# Patient Record
Sex: MALE | Race: WHITE | NOT HISPANIC OR LATINO | Employment: OTHER | URBAN - METROPOLITAN AREA
[De-identification: names, ages, dates, MRNs, and addresses within clinical notes are randomized per-mention and may not be internally consistent; named-entity substitution may affect disease eponyms.]

---

## 2017-03-31 RX ORDER — PRAVASTATIN SODIUM 80 MG/1
TABLET ORAL
Qty: 90 TABLET | Refills: 0 | Status: SHIPPED | OUTPATIENT
Start: 2017-03-31 | End: 2019-10-30 | Stop reason: SDUPTHER

## 2017-05-15 RX ORDER — LISINOPRIL 10 MG/1
TABLET ORAL
Qty: 90 TABLET | Refills: 0 | Status: SHIPPED | OUTPATIENT
Start: 2017-05-15 | End: 2017-08-15 | Stop reason: SDUPTHER

## 2017-07-06 ENCOUNTER — TRANSCRIBE ORDERS (OUTPATIENT)
Dept: LAB | Facility: HOSPITAL | Age: 67
End: 2017-07-06

## 2017-07-06 ENCOUNTER — APPOINTMENT (OUTPATIENT)
Dept: LAB | Facility: HOSPITAL | Age: 67
End: 2017-07-06

## 2017-07-06 DIAGNOSIS — E78.5 HYPERLIPIDEMIA, UNSPECIFIED HYPERLIPIDEMIA TYPE: Primary | ICD-10-CM

## 2017-07-06 DIAGNOSIS — I10 ESSENTIAL HYPERTENSION, MALIGNANT: ICD-10-CM

## 2017-07-06 LAB
ALBUMIN SERPL-MCNC: 4.1 G/DL (ref 3.2–4.8)
ALBUMIN/GLOB SERPL: 1.7 G/DL (ref 1.5–2.5)
ALP SERPL-CCNC: 70 U/L (ref 25–100)
ALT SERPL W P-5'-P-CCNC: 33 U/L (ref 7–40)
ANION GAP SERPL CALCULATED.3IONS-SCNC: 6 MMOL/L (ref 3–11)
AST SERPL-CCNC: 21 U/L (ref 0–33)
BASOPHILS # BLD AUTO: 0.02 10*3/MM3 (ref 0–0.2)
BASOPHILS NFR BLD AUTO: 0.2 % (ref 0–1)
BILIRUB SERPL-MCNC: 0.5 MG/DL (ref 0.3–1.2)
BUN BLD-MCNC: 14 MG/DL (ref 9–23)
BUN/CREAT SERPL: 14 (ref 7–25)
CALCIUM SPEC-SCNC: 9.2 MG/DL (ref 8.7–10.4)
CHLORIDE SERPL-SCNC: 108 MMOL/L (ref 99–109)
CO2 SERPL-SCNC: 25 MMOL/L (ref 20–31)
CREAT BLD-MCNC: 1 MG/DL (ref 0.6–1.3)
DEPRECATED RDW RBC AUTO: 43.7 FL (ref 37–54)
EOSINOPHIL # BLD AUTO: 0.24 10*3/MM3 (ref 0–0.3)
EOSINOPHIL NFR BLD AUTO: 2.6 % (ref 0–3)
ERYTHROCYTE [DISTWIDTH] IN BLOOD BY AUTOMATED COUNT: 12.8 % (ref 11.3–14.5)
GFR SERPL CREATININE-BSD FRML MDRD: 74 ML/MIN/1.73
GLOBULIN UR ELPH-MCNC: 2.4 GM/DL
GLUCOSE BLD-MCNC: 100 MG/DL (ref 70–100)
HCT VFR BLD AUTO: 47.9 % (ref 38.9–50.9)
HGB BLD-MCNC: 15.7 G/DL (ref 13.1–17.5)
IMM GRANULOCYTES # BLD: 0.07 10*3/MM3 (ref 0–0.03)
IMM GRANULOCYTES NFR BLD: 0.8 % (ref 0–0.6)
LYMPHOCYTES # BLD AUTO: 2.06 10*3/MM3 (ref 0.6–4.8)
LYMPHOCYTES NFR BLD AUTO: 22.7 % (ref 24–44)
MCH RBC QN AUTO: 30.5 PG (ref 27–31)
MCHC RBC AUTO-ENTMCNC: 32.8 G/DL (ref 32–36)
MCV RBC AUTO: 93 FL (ref 80–99)
MONOCYTES # BLD AUTO: 0.62 10*3/MM3 (ref 0–1)
MONOCYTES NFR BLD AUTO: 6.8 % (ref 0–12)
NEUTROPHILS # BLD AUTO: 6.07 10*3/MM3 (ref 1.5–8.3)
NEUTROPHILS NFR BLD AUTO: 66.9 % (ref 41–71)
PLATELET # BLD AUTO: 196 10*3/MM3 (ref 150–450)
PMV BLD AUTO: 10.2 FL (ref 6–12)
POTASSIUM BLD-SCNC: 4.1 MMOL/L (ref 3.5–5.5)
PROT SERPL-MCNC: 6.5 G/DL (ref 5.7–8.2)
RBC # BLD AUTO: 5.15 10*6/MM3 (ref 4.2–5.76)
SODIUM BLD-SCNC: 139 MMOL/L (ref 132–146)
VIT B12 BLD-MCNC: 314 PG/ML (ref 211–911)
WBC NRBC COR # BLD: 9.08 10*3/MM3 (ref 3.5–10.8)

## 2017-07-06 PROCEDURE — 36415 COLL VENOUS BLD VENIPUNCTURE: CPT | Performed by: NURSE PRACTITIONER

## 2017-07-06 PROCEDURE — 85025 COMPLETE CBC W/AUTO DIFF WBC: CPT | Performed by: NURSE PRACTITIONER

## 2017-07-06 PROCEDURE — 82607 VITAMIN B-12: CPT | Performed by: NURSE PRACTITIONER

## 2017-07-06 PROCEDURE — 80053 COMPREHEN METABOLIC PANEL: CPT | Performed by: NURSE PRACTITIONER

## 2017-08-15 RX ORDER — LISINOPRIL 10 MG/1
TABLET ORAL
Qty: 90 TABLET | Refills: 0 | Status: SHIPPED | OUTPATIENT
Start: 2017-08-15 | End: 2019-10-30 | Stop reason: SDUPTHER

## 2019-04-06 ENCOUNTER — APPOINTMENT (OUTPATIENT)
Dept: CT IMAGING | Facility: HOSPITAL | Age: 69
End: 2019-04-06

## 2019-04-06 ENCOUNTER — HOSPITAL ENCOUNTER (OUTPATIENT)
Facility: HOSPITAL | Age: 69
Discharge: HOME | End: 2019-04-09
Attending: EMERGENCY MEDICINE | Admitting: SURGERY

## 2019-04-06 DIAGNOSIS — K92.2 GASTROINTESTINAL HEMORRHAGE, UNSPECIFIED GASTROINTESTINAL HEMORRHAGE TYPE: Primary | ICD-10-CM

## 2019-04-06 DIAGNOSIS — Z74.09 IMPAIRED FUNCTIONAL MOBILITY, BALANCE, GAIT, AND ENDURANCE: ICD-10-CM

## 2019-04-06 DIAGNOSIS — D62 ACUTE BLOOD LOSS ANEMIA: ICD-10-CM

## 2019-04-06 PROBLEM — Z72.0 CHEWING TOBACCO USE: Status: ACTIVE | Noted: 2019-04-06

## 2019-04-06 PROBLEM — D75.839 THROMBOCYTOSIS: Status: ACTIVE | Noted: 2019-04-06

## 2019-04-06 PROBLEM — D64.9 SYMPTOMATIC ANEMIA: Status: ACTIVE | Noted: 2019-04-06

## 2019-04-06 LAB
ABO GROUP BLD: NORMAL
ABO GROUP BLD: NORMAL
ALBUMIN SERPL-MCNC: 4.3 G/DL (ref 3.5–5)
ALBUMIN/GLOB SERPL: 1.4 G/DL (ref 1–2)
ALP SERPL-CCNC: 65 U/L (ref 38–126)
ALT SERPL W P-5'-P-CCNC: 11 U/L (ref 13–69)
ANION GAP SERPL CALCULATED.3IONS-SCNC: 16 MMOL/L (ref 10–20)
ANISOCYTOSIS BLD QL: NORMAL
AST SERPL-CCNC: 16 U/L (ref 15–46)
BASOPHILS # BLD AUTO: 0.06 10*3/MM3 (ref 0–0.2)
BASOPHILS NFR BLD AUTO: 0.5 % (ref 0–1.5)
BILIRUB SERPL-MCNC: 0.3 MG/DL (ref 0.2–1.3)
BLD GP AB SCN SERPL QL: NEGATIVE
BUN BLD-MCNC: 14 MG/DL (ref 7–20)
BUN/CREAT SERPL: 11.7 (ref 6.3–21.9)
CALCIUM SPEC-SCNC: 9.8 MG/DL (ref 8.4–10.2)
CHLORIDE SERPL-SCNC: 103 MMOL/L (ref 98–107)
CO2 SERPL-SCNC: 24 MMOL/L (ref 26–30)
CREAT BLD-MCNC: 1.2 MG/DL (ref 0.6–1.3)
DEPRECATED RDW RBC AUTO: 45.3 FL (ref 37–54)
EOSINOPHIL # BLD AUTO: 0.21 10*3/MM3 (ref 0–0.4)
EOSINOPHIL NFR BLD AUTO: 1.9 % (ref 0.3–6.2)
ERYTHROCYTE [DISTWIDTH] IN BLOOD BY AUTOMATED COUNT: 19.2 % (ref 12.3–15.4)
GFR SERPL CREATININE-BSD FRML MDRD: 60 ML/MIN/1.73
GLOBULIN UR ELPH-MCNC: 3 GM/DL
GLUCOSE BLD-MCNC: 139 MG/DL (ref 74–98)
HCT VFR BLD AUTO: 25.7 % (ref 37.5–51)
HCT VFR BLD AUTO: 28.7 % (ref 37.5–51)
HEMOCCULT STL QL: POSITIVE
HGB BLD-MCNC: 6.5 G/DL (ref 13–17.7)
HGB BLD-MCNC: 8.2 G/DL (ref 13–17.7)
HOLD SPECIMEN: NORMAL
HOLD SPECIMEN: NORMAL
HYPOCHROMIA BLD QL: NORMAL
IMM GRANULOCYTES # BLD AUTO: 0.05 10*3/MM3 (ref 0–0.05)
IMM GRANULOCYTES NFR BLD AUTO: 0.5 % (ref 0–0.5)
INR PPP: 1.1 (ref 0.9–1.1)
IRON 24H UR-MRATE: 22 MCG/DL (ref 37–181)
IRON SATN MFR SERPL: 5 % (ref 11–46)
LYMPHOCYTES # BLD AUTO: 1.68 10*3/MM3 (ref 0.7–3.1)
LYMPHOCYTES NFR BLD AUTO: 15.1 % (ref 19.6–45.3)
MCH RBC QN AUTO: 16.8 PG (ref 26.6–33)
MCHC RBC AUTO-ENTMCNC: 25.3 G/DL (ref 31.5–35.7)
MCV RBC AUTO: 66.4 FL (ref 79–97)
MONOCYTES # BLD AUTO: 0.73 10*3/MM3 (ref 0.1–0.9)
MONOCYTES NFR BLD AUTO: 6.6 % (ref 5–12)
NEUTROPHILS # BLD AUTO: 8.36 10*3/MM3 (ref 1.4–7)
NEUTROPHILS NFR BLD AUTO: 75.4 % (ref 42.7–76)
NRBC BLD AUTO-RTO: 0 /100 WBC (ref 0–0)
OVALOCYTES BLD QL SMEAR: NORMAL
PLAT MORPH BLD: NORMAL
PLATELET # BLD AUTO: 523 10*3/MM3 (ref 140–450)
PMV BLD AUTO: 8.8 FL (ref 6–12)
POIKILOCYTOSIS BLD QL SMEAR: NORMAL
POTASSIUM BLD-SCNC: 4 MMOL/L (ref 3.5–5.1)
PROT SERPL-MCNC: 7.3 G/DL (ref 6.3–8.2)
PROTHROMBIN TIME: 14.5 SECONDS (ref 12–15.1)
RBC # BLD AUTO: 3.87 10*6/MM3 (ref 4.14–5.8)
RH BLD: POSITIVE
RH BLD: POSITIVE
SODIUM BLD-SCNC: 139 MMOL/L (ref 137–145)
T&S EXPIRATION DATE: NORMAL
TARGETS BLD QL SMEAR: NORMAL
TIBC SERPL-MCNC: 487 MCG/DL (ref 261–497)
WBC MORPH BLD: NORMAL
WBC NRBC COR # BLD: 11.09 10*3/MM3 (ref 3.4–10.8)
WHOLE BLOOD HOLD SPECIMEN: NORMAL
WHOLE BLOOD HOLD SPECIMEN: NORMAL

## 2019-04-06 PROCEDURE — 99220 PR INITIAL OBSERVATION CARE/DAY 70 MINUTES: CPT | Performed by: HOSPITALIST

## 2019-04-06 PROCEDURE — 82272 OCCULT BLD FECES 1-3 TESTS: CPT | Performed by: PHYSICIAN ASSISTANT

## 2019-04-06 PROCEDURE — 83550 IRON BINDING TEST: CPT | Performed by: HOSPITALIST

## 2019-04-06 PROCEDURE — 85610 PROTHROMBIN TIME: CPT | Performed by: PHYSICIAN ASSISTANT

## 2019-04-06 PROCEDURE — 85007 BL SMEAR W/DIFF WBC COUNT: CPT | Performed by: PHYSICIAN ASSISTANT

## 2019-04-06 PROCEDURE — 86900 BLOOD TYPING SEROLOGIC ABO: CPT

## 2019-04-06 PROCEDURE — 85014 HEMATOCRIT: CPT | Performed by: HOSPITALIST

## 2019-04-06 PROCEDURE — 25010000002 IOPAMIDOL 61 % SOLUTION: Performed by: EMERGENCY MEDICINE

## 2019-04-06 PROCEDURE — G0378 HOSPITAL OBSERVATION PER HR: HCPCS

## 2019-04-06 PROCEDURE — 93005 ELECTROCARDIOGRAM TRACING: CPT | Performed by: EMERGENCY MEDICINE

## 2019-04-06 PROCEDURE — 86901 BLOOD TYPING SEROLOGIC RH(D): CPT | Performed by: PHYSICIAN ASSISTANT

## 2019-04-06 PROCEDURE — 86901 BLOOD TYPING SEROLOGIC RH(D): CPT

## 2019-04-06 PROCEDURE — 36430 TRANSFUSION BLD/BLD COMPNT: CPT

## 2019-04-06 PROCEDURE — 74177 CT ABD & PELVIS W/CONTRAST: CPT

## 2019-04-06 PROCEDURE — 80053 COMPREHEN METABOLIC PANEL: CPT | Performed by: PHYSICIAN ASSISTANT

## 2019-04-06 PROCEDURE — 85025 COMPLETE CBC W/AUTO DIFF WBC: CPT | Performed by: PHYSICIAN ASSISTANT

## 2019-04-06 PROCEDURE — 85018 HEMOGLOBIN: CPT | Performed by: HOSPITALIST

## 2019-04-06 PROCEDURE — 86900 BLOOD TYPING SEROLOGIC ABO: CPT | Performed by: PHYSICIAN ASSISTANT

## 2019-04-06 PROCEDURE — 86920 COMPATIBILITY TEST SPIN: CPT

## 2019-04-06 PROCEDURE — 86850 RBC ANTIBODY SCREEN: CPT | Performed by: PHYSICIAN ASSISTANT

## 2019-04-06 PROCEDURE — P9016 RBC LEUKOCYTES REDUCED: HCPCS

## 2019-04-06 PROCEDURE — 83540 ASSAY OF IRON: CPT | Performed by: HOSPITALIST

## 2019-04-06 PROCEDURE — 99285 EMERGENCY DEPT VISIT HI MDM: CPT

## 2019-04-06 RX ORDER — ACETAMINOPHEN 325 MG/1
650 TABLET ORAL EVERY 4 HOURS PRN
Status: DISCONTINUED | OUTPATIENT
Start: 2019-04-06 | End: 2019-04-09 | Stop reason: HOSPADM

## 2019-04-06 RX ORDER — ATORVASTATIN CALCIUM 20 MG/1
20 TABLET, FILM COATED ORAL NIGHTLY
Status: DISCONTINUED | OUTPATIENT
Start: 2019-04-06 | End: 2019-04-09 | Stop reason: HOSPADM

## 2019-04-06 RX ORDER — SODIUM CHLORIDE 0.9 % (FLUSH) 0.9 %
10 SYRINGE (ML) INJECTION AS NEEDED
Status: DISCONTINUED | OUTPATIENT
Start: 2019-04-06 | End: 2019-04-09 | Stop reason: HOSPADM

## 2019-04-06 RX ORDER — SODIUM CHLORIDE 0.9 % (FLUSH) 0.9 %
3 SYRINGE (ML) INJECTION EVERY 12 HOURS SCHEDULED
Status: DISCONTINUED | OUTPATIENT
Start: 2019-04-06 | End: 2019-04-09 | Stop reason: HOSPADM

## 2019-04-06 RX ORDER — LISINOPRIL 5 MG/1
5 TABLET ORAL DAILY
Status: DISCONTINUED | OUTPATIENT
Start: 2019-04-07 | End: 2019-04-09 | Stop reason: HOSPADM

## 2019-04-06 RX ORDER — SODIUM CHLORIDE 0.9 % (FLUSH) 0.9 %
3-10 SYRINGE (ML) INJECTION AS NEEDED
Status: DISCONTINUED | OUTPATIENT
Start: 2019-04-06 | End: 2019-04-09 | Stop reason: HOSPADM

## 2019-04-06 RX ORDER — BISOPROLOL FUMARATE 5 MG/1
5 TABLET, FILM COATED ORAL DAILY
Status: DISCONTINUED | OUTPATIENT
Start: 2019-04-07 | End: 2019-04-09 | Stop reason: HOSPADM

## 2019-04-06 RX ORDER — ONDANSETRON 2 MG/ML
4 INJECTION INTRAMUSCULAR; INTRAVENOUS EVERY 6 HOURS PRN
Status: DISCONTINUED | OUTPATIENT
Start: 2019-04-06 | End: 2019-04-09 | Stop reason: HOSPADM

## 2019-04-06 RX ADMIN — SODIUM CHLORIDE, PRESERVATIVE FREE 3 ML: 5 INJECTION INTRAVENOUS at 21:30

## 2019-04-06 RX ADMIN — ATORVASTATIN CALCIUM 20 MG: 20 TABLET, FILM COATED ORAL at 21:30

## 2019-04-06 RX ADMIN — IOPAMIDOL 100 ML: 612 INJECTION, SOLUTION INTRAVENOUS at 14:40

## 2019-04-06 NOTE — ED NOTES
Dr. Diaz called per Patrice HENRIQUEZ with no answer. Left message on voicemail.      Ronna Isidro  04/06/19 9553

## 2019-04-06 NOTE — ED PROVIDER NOTES
Subjective   69-year-old male presents the emergency department for weakness, he states that he had labs drawn recently and was called and told that his hemoglobin and hematocrit are low.  He states that for some time he is noticed that he is getting progressively weaker.  He has also been told that he appears pale.  He has not noticed any blood loss, he denies any blood in his stool or vomit.  He is never had upper or lower GI scope.        History provided by:  Patient   used: No        Review of Systems   Endocrine:        Anemia   Neurological: Positive for weakness.   All other systems reviewed and are negative.      Past Medical History:   Diagnosis Date   • Coronary artery disease    • Dyslipidemia    • Hypertension    • Subsequent ST elevation (STEMI) myocardial infarction of inferior wall (CMS/[facility]) 07/29/2011    99% RCA (2.75 x 23 Promus post with 3.0 Quantum), 60% OM, LVEF normal.       Allergies   Allergen Reactions   • Losartan Rash       History reviewed. No pertinent surgical history.    Family History   Problem Relation Age of Onset   • Emphysema Father    • No Known Problems Sister        Social History     Socioeconomic History   • Marital status: Single     Spouse name: Not on file   • Number of children: Not on file   • Years of education: Not on file   • Highest education level: Not on file   Tobacco Use   • Smoking status: Never Smoker   • Smokeless tobacco: Current User     Types: Snuff   Substance and Sexual Activity   • Alcohol use: No   • Drug use: No   • Sexual activity: Defer           Objective   Physical Exam   Constitutional: He is oriented to person, place, and time. He appears well-developed and well-nourished.   HENT:   Head: Normocephalic and atraumatic.   Left Ear: External ear normal.   Eyes: EOM are normal. Pupils are equal, round, and reactive to light.   Pale conjunctiva   Neck: Normal range of motion.   Cardiovascular: Normal rate and regular rhythm.    Pulmonary/Chest: Effort normal and breath sounds normal.   Abdominal: Soft. Bowel sounds are normal.   Musculoskeletal: Normal range of motion.   Neurological: He is alert and oriented to person, place, and time.   Skin: Skin is warm and dry. There is pallor.   Psychiatric: He has a normal mood and affect. His behavior is normal.   Vitals reviewed.      Procedures           ED Course  ED Course as of Apr 06 1516   Sat Apr 06, 2019   1507 Discussed with Dr. Hinojosa he would be available to see the patient is a consult admit to the hospitalist  [CS]   1512 Discussed with Dr. Diaz she accepted the patient for admission  [CS]      ED Course User Index  [CS] Patrice Rivera Jr., [place]                  MDM  Number of Diagnoses or Management Options  Gastrointestinal hemorrhage, unspecified gastrointestinal hemorrhage type: new and requires workup     Amount and/or Complexity of Data Reviewed  Discuss the patient with other providers: yes    Risk of Complications, Morbidity, and/or Mortality  Presenting problems: minimal  Diagnostic procedures: minimal  Management options: minimal    Patient Progress  Patient progress: stable        Final diagnoses:   Gastrointestinal hemorrhage, unspecified gastrointestinal hemorrhage type            Patrice Rivera Jr., [place]  04/06/19 1516

## 2019-04-06 NOTE — H&P
[facility]    Name: Nilay Odonnell, 69 y.o. male  MRN: [id number], : 1950   Date of Admission: 2019   PCP: Michele Burris MD     Chief Complaint: abnormal labs     History of Present Illness:  Nilay Odonnell is a(n) 69 y.o. year old male with a history of CAD (inferior wall STEM[facility] 2011 [facility] HTN who was admitted on 2019 from home to [facility] ER with abnormal labs. Hemoglobin was 6.5 today in ER. Patient noted fatigue and being unsteady when getting up this winter. He denies any chest pain or pressure. He denies any shortness of breath with exertion. Denies abdominal pain, NSAID use, alcohol use, melena, or BRBPR. Denies any weightloss. Never had colonoscopy. He does take aspirin 81mg. He lives alone, has been using a staff recently to steady himself. No history of falls.    Vitals on admission notable for: AFVSS. Labs on admission notable for: PT/INR wnl; hgb 6.5; platelet 523; FOBT positive. #2 units pRBC ordered in ER. Studies on admission notable for: CT A/P: \"No CT evidence of acute intraabdominal or intrapelvic abnormality.\". EKG: (my read), compared to EKG from: 12. Rate: 81bpm / Rhythm: NSR / Axis: left / ST/T changes: TWI II, III, V1, V3 / Hypertrophy: no / QTc: 392ms.    Patient seen at 415pm on 2019. History was provided by: chart review, discussion with ER provider (Patrice Rivera Jr., PA-C), and patient.     Recent hospitalization?: no     =========================================================================  History:   ROS: all other systems reviewed and are negative  PMHx: Patient  has a past medical history of Coronary artery disease, Dyslipidemia, Hypertension, and Subsequent ST elevation (STEMI) myocardial infarction of inferior wall (CMS/HCC) (2011).   PSHx: skin cancers  FamHx: Patient family history includes Emphysema in his father; No Known Problems in his sister.   SocHx: Patient  reports that " "he has never smoked. His smokeless tobacco use includes snuff. He reports that he does not drink alcohol or use drugs.   Allergies: Patient is allergic to losartan.   Medications:   No current facility-administered medications on file prior to encounter.      Current Outpatient Medications on File Prior to Encounter   Medication Sig Dispense Refill   • aspirin 81 MG EC tablet Take 81 mg by mouth daily.     • bisoprolol (ZEBETA) 5 MG tablet Take 1 tablet by mouth daily. 90 tablet 3   • lisinopril (PRINIVIL,ZESTRIL) 10 MG tablet TAKE ONE TABLET BY MOUTH ONCE DAILY 90 tablet 0   • pravastatin (PRAVACHOL) 80 MG tablet TAKE ONE TABLET BY MOUTH ONCE DAILY EVERY  NIGHT 90 tablet 0        =========================================================================    Vitals:   /58   Pulse 73   Temp 98.2 °F (36.8 °C)   Resp 18   Ht 177.8 cm (70\")   Wt 95.9 kg (211 lb 6.4 oz)   SpO2 98%   BMI 30.33 kg/m²    SpO2: 98 %   No intake or output data in the 24 hours ending 04/06/19 1513     Physical Exam:   General Appearance:  Elderly male; pale; Alert and cooperative, not in any acute distress.   Head:  Atraumatic and normocephalic, without obvious abnormality.   Eyes:          PERRL, conjunctivae pallor; sclerae normal, no Icterus. No pallor. Extraocular movements are within normal limits.   Ears:  Ears appear intact with no abnormalities noted.   Throat: No oral lesions, no thrush, oral mucosa moist.   Neck: Supple, trachea midline   Lungs:   Chest shape is normal. Breath sounds heard bilaterally equally.  No crackles or wheezing. No Pleural rub or bronchial breathing.   Heart:  Normal S1 and S2, no murmur, no gallop, no rub   Abdomen:   Normal bowel sounds, no masses. Soft, non-tender, non-distended, no guarding, no rebound tenderness.   Genitourinary: deferred   Extremities: Moves all extremities well, no edema, no cyanosis, no clubbing.   Pulses: Pulses palpable and equal bilaterally.   Skin: No bleeding, bruising " or rash.   Lymph nodes: No palpable adenopathy.   Neurologic: Alert and oriented x 3. Moves all four limbs equally. No tremors. No facial asymetry.       Labs:   Results from last 7 days   Lab Units 04/06/19  1305   SODIUM mmol/L 139   POTASSIUM mmol/L 4.0   CHLORIDE mmol/L 103   CO2 mmol/L 24.0*   BUN mg/dL 14   CREATININE mg/dL 1.20   CALCIUM mg/dL 9.8   BILIRUBIN mg/dL 0.3   ALK PHOS U/L 65   ALT (SGPT) U/L 11*   AST (SGOT) U/L 16   GLUCOSE mg/dL 139*     Results from last 7 days   Lab Units 04/06/19  1305   WBC 10*3/mm3 11.09*   HEMOGLOBIN g/dL 6.5*   HEMATOCRIT % 25.7*   PLATELETS 10*3/mm3 523*     Results from last 7 days   Lab Units 04/06/19  1305   INR  1.10       =========================================================================  Assessment/Plan:   Nilay Odonnell is a(n) 69 y.o. year old male with:     1. Symptomatic microcytic anemia, suspect chronic blood loss and iron deficiency  2. Thrombocytosis, suspect reactive  3. CAD s/p STEMI 2011 s/p stent x 1  4. HTN  5. Chewing tobacco use, x 30years    Will transfuse 2 units PRBC. Start clear liquid diet. Hemodynamically stable. Add-on iron profile to prior labs. Will consult Dr. Hinojosa, contacted by ER provider. Patient interested in proceeding with colonoscopy. Maintain 2 large bore PIVs at all times. Advised to stop smokeless tobacco use, patient does not seem ready to consider. Fall precautions. Consult PT/OT tomorrow. Hold aspirin. Patient states she takes half of the lisinopril dose. He was not sure if he has 10mg or 20mg tablets at home. I called [facility] pharmacy and confirmed that he has lisinopril 10mg prescription. Will order lisinopril 5mg dose here.    // F/E/N: clear liquids; NPO at MN  // DVT PPx: SCDs   // GI PPx: not indicated    // Code Status: FULL CODE - per discussion with patient, would only want to be on ventilator short-term  // NOK: Abhay Odonnell (brother) [phone]  // Dispo: admission, observation, need for hospitalization:  above    Assessment and plan was discussed with the patient and primary nurse Veronica. All questions were answered.     Time in: 415pm Time out: 458pm   Date patient seen: 4/6/2019     Treasure Diaz MD   3:13 PM on 4/6/2019

## 2019-04-07 LAB
ANION GAP SERPL CALCULATED.3IONS-SCNC: 13.2 MMOL/L (ref 10–20)
ANISOCYTOSIS BLD QL: NORMAL
BASOPHILS # BLD AUTO: 0.07 10*3/MM3 (ref 0–0.2)
BASOPHILS NFR BLD AUTO: 0.6 % (ref 0–1.5)
BUN BLD-MCNC: 11 MG/DL (ref 7–20)
BUN/CREAT SERPL: 10 (ref 6.3–21.9)
CALCIUM SPEC-SCNC: 9 MG/DL (ref 8.4–10.2)
CHLORIDE SERPL-SCNC: 106 MMOL/L (ref 98–107)
CO2 SERPL-SCNC: 23 MMOL/L (ref 26–30)
CREAT BLD-MCNC: 1.1 MG/DL (ref 0.6–1.3)
DEPRECATED RDW RBC AUTO: 52.5 FL (ref 37–54)
EOSINOPHIL # BLD AUTO: 0.26 10*3/MM3 (ref 0–0.4)
EOSINOPHIL NFR BLD AUTO: 2.4 % (ref 0.3–6.2)
ERYTHROCYTE [DISTWIDTH] IN BLOOD BY AUTOMATED COUNT: 21.4 % (ref 12.3–15.4)
GFR SERPL CREATININE-BSD FRML MDRD: 66 ML/MIN/1.73
GLUCOSE BLD-MCNC: 91 MG/DL (ref 74–98)
HCT VFR BLD AUTO: 28 % (ref 37.5–51)
HGB BLD-MCNC: 7.9 G/DL (ref 13–17.7)
IMM GRANULOCYTES # BLD AUTO: 0.06 10*3/MM3 (ref 0–0.05)
IMM GRANULOCYTES NFR BLD AUTO: 0.5 % (ref 0–0.5)
LYMPHOCYTES # BLD AUTO: 1.67 10*3/MM3 (ref 0.7–3.1)
LYMPHOCYTES NFR BLD AUTO: 15.3 % (ref 19.6–45.3)
MCH RBC QN AUTO: 19.4 PG (ref 26.6–33)
MCHC RBC AUTO-ENTMCNC: 28.2 G/DL (ref 31.5–35.7)
MCV RBC AUTO: 68.8 FL (ref 79–97)
MICROCYTES BLD QL: NORMAL
MONOCYTES # BLD AUTO: 0.79 10*3/MM3 (ref 0.1–0.9)
MONOCYTES NFR BLD AUTO: 7.2 % (ref 5–12)
NEUTROPHILS # BLD AUTO: 8.08 10*3/MM3 (ref 1.4–7)
NEUTROPHILS NFR BLD AUTO: 74 % (ref 42.7–76)
NRBC BLD AUTO-RTO: 0 /100 WBC (ref 0–0)
PLATELET # BLD AUTO: 402 10*3/MM3 (ref 140–450)
PMV BLD AUTO: 9.4 FL (ref 6–12)
POTASSIUM BLD-SCNC: 4.2 MMOL/L (ref 3.5–5.1)
RBC # BLD AUTO: 4.07 10*6/MM3 (ref 4.14–5.8)
SMALL PLATELETS BLD QL SMEAR: NORMAL
SODIUM BLD-SCNC: 138 MMOL/L (ref 137–145)
WBC MORPH BLD: NORMAL
WBC NRBC COR # BLD: 10.93 10*3/MM3 (ref 3.4–10.8)

## 2019-04-07 PROCEDURE — 99224 PR SBSQ OBSERVATION CARE/DAY 15 MINUTES: CPT | Performed by: HOSPITALIST

## 2019-04-07 PROCEDURE — 85007 BL SMEAR W/DIFF WBC COUNT: CPT | Performed by: HOSPITALIST

## 2019-04-07 PROCEDURE — 99202 OFFICE O/P NEW SF 15 MIN: CPT | Performed by: SURGERY

## 2019-04-07 PROCEDURE — 97161 PT EVAL LOW COMPLEX 20 MIN: CPT

## 2019-04-07 PROCEDURE — G0378 HOSPITAL OBSERVATION PER HR: HCPCS

## 2019-04-07 PROCEDURE — 80048 BASIC METABOLIC PNL TOTAL CA: CPT | Performed by: HOSPITALIST

## 2019-04-07 PROCEDURE — 85025 COMPLETE CBC W/AUTO DIFF WBC: CPT | Performed by: HOSPITALIST

## 2019-04-07 RX ORDER — DEXTROSE AND SODIUM CHLORIDE 5; .45 G/100ML; G/100ML
75 INJECTION, SOLUTION INTRAVENOUS CONTINUOUS
Status: DISCONTINUED | OUTPATIENT
Start: 2019-04-07 | End: 2019-04-08

## 2019-04-07 RX ADMIN — ATORVASTATIN CALCIUM 20 MG: 20 TABLET, FILM COATED ORAL at 21:18

## 2019-04-07 RX ADMIN — SODIUM CHLORIDE, PRESERVATIVE FREE 3 ML: 5 INJECTION INTRAVENOUS at 08:27

## 2019-04-07 RX ADMIN — POLYETHYLENE GLYCOL 3350 255 G: 17 POWDER, FOR SOLUTION ORAL at 16:25

## 2019-04-07 RX ADMIN — LISINOPRIL 5 MG: 5 TABLET ORAL at 08:26

## 2019-04-07 RX ADMIN — SODIUM CHLORIDE, PRESERVATIVE FREE 3 ML: 5 INJECTION INTRAVENOUS at 21:18

## 2019-04-07 RX ADMIN — BISOPROLOL FUMARATE 5 MG: 5 TABLET ORAL at 08:26

## 2019-04-07 NOTE — PLAN OF CARE
Problem: Patient Care Overview  Goal: Plan of Care Review  Outcome: Ongoing (interventions implemented as appropriate)   04/07/19 0035   Coping/Psychosocial   Plan of Care Reviewed With patient   Plan of Care Review   Progress improving   OTHER   Outcome Summary VSS, 2 UNITS PRBC'S TRANSFUSED, IMPROVED HGB.        Problem: Fall Risk (Adult)  Goal: Identify Related Risk Factors and Signs and Symptoms  Outcome: Outcome(s) achieved Date Met: 04/07/19    Goal: Absence of Fall  Outcome: Ongoing (interventions implemented as appropriate)

## 2019-04-07 NOTE — PROGRESS NOTES
Discharge Planning Assessment  [facility]     Patient Name: Nilay Odonnell  MRN: [id number]  Today's Date: 4/7/2019    Admit Date: 4/6/2019    Discharge Needs Assessment     Row Name 04/07/19 1415       Living Environment    Lives With  alone    Current Living Arrangements  home/apartment/condo    Primary Care Provided by  self    Provides Primary Care For  no one    Family Caregiver if Needed  none    Quality of Family Relationships  supportive    Able to Return to Prior Arrangements  yes    Living Arrangement Comments  Pt lives alone in his home of 20 years   No safety issues voiced       Resource/Environmental Concerns    Resource/Environmental Concerns  none       Transition Planning    Transportation Anticipated  family or friend will provide       Discharge Needs Assessment    Readmission Within the Last 30 Days  no previous admission in last 30 days    Concerns to be Addressed  no discharge needs identified    Equipment Currently Used at Home  cane, straight;walker, standard    Anticipated Changes Related to Illness  none    Equipment Needed After Discharge  none        Discharge Plan     Row Name 04/07/19 1419       Plan    Plan  Spoke with pt about discharge plans  Confirmed current address and phone numbers  Moraima Rodrigues sister [phone] and Abhay Odonnell son [phone]  No POA  Pt states ADL is good and can do tasks without problems   Uses cane and walker PRN  PCP Kimberly Dumont NP  DME walker and cane  Pt states he has never used HH services in the past  Will continue to assess pt for any further needs prior to discharge         Destination      No service coordination in this encounter.      Durable Medical Equipment      No service coordination in this encounter.      Dialysis/Infusion      No service coordination in this encounter.      Home Medical Care      No service coordination in this encounter.      Therapy      No service coordination in this encounter.      Community Resources      No  service coordination in this encounter.        Expected Discharge Date and Time     Expected Discharge Date Expected Discharge Time    Apr 8, 2019         Demographic Summary     Row Name 04/07/19 1409       General Information    Admission Type  observation    Arrived From  emergency department    Required Notices Provided  Observation Status Notice    Referral Source  admission list    Reason for Consult  discharge planning    Preferred Language  English       Contact Information    Permission Granted to Share Info With      Contact Information Obtained for          Functional Status     Row Name 04/07/19 1414       Functional Status    Usual Activity Tolerance  good       Functional Status, IADL    Medications  independent    Meal Preparation  independent    Housekeeping  independent    Laundry  independent    Shopping  independent       Employment/    Employment Status  retired        Psychosocial    No documentation.       Abuse/Neglect    No documentation.       Legal    No documentation.       Substance Abuse    No documentation.       Patient Forms    No documentation.           Helga Gómez RN

## 2019-04-07 NOTE — PROGRESS NOTES
[facility]    Name: Nilay Odonnell, 69 y.o. male  MRN: [id number], : 1950   Date of Admission: 2019   Date of Service: 19   PCP: Michele Burris MD     Hospital Course:   Nilay Odonnell is a(n) 69 y.o. year old male with a history of CAD (inferior wall STEMI 2011 s/p stent x 1 at [facility]), HTN who was admitted on 2019 from home to [facility] ER with abnormal labs. Hemoglobin was 6.5 today in ER. Patient noted fatigue and being unsteady when getting up this winter. He denies any chest pain or pressure. He denies any shortness of breath with exertion. Denies abdominal pain, NSAID use, alcohol use, melena, or BRBPR. Denies any weightloss. Never had colonoscopy. He does take aspirin 81mg. He lives alone, has been using a staff recently to steady himself. No history of falls.     Vitals on admission notable for: AFVSS. Labs on admission notable for: PT/INR wnl; hgb 6.5; platelet 523; FOBT positive. #2 units pRBC ordered in ER. Studies on admission notable for: CT A/P: \"No CT evidence of acute intraabdominal or intrapelvic abnormality.\". EKG: (my read), compared to EKG from: 12. Rate: 81bpm / Rhythm: NSR / Axis: left / ST/T changes: TWI II, III, V1, V3 / Hypertrophy: no / QTc: 392ms.    Consultants: Dr. Hinojosa  Procedures:  #2 unit PRBC    Antibiotics: none   Micro: none  -------------------------------------------------------------------------------------------------------------------  Subjective   Chief Complaint: f/u anemia     Subjective:   Patient seen and examined this morning.  Tele reviewed. Events from last night noted. Discussed with MANDO Martin. Denies any dizziness or unsteadiness with movement.     Review of Systems:   Gen-no fevers, no chills  CV-no chest pain, no palpitations  Resp-no cough, no dyspnea  GI-no N/V/D, no abd pain    Objective   Objective:     Intake/Output Summary (Last 24 hours) at 2019 0852  Last data filed at 2019 " 0522  Gross per 24 hour   Intake 675 ml   Output 975 ml   Net -300 ml      SpO2: 94 %     Physical Examination:   Vitals:    04/07/19 0421 04/07/19 0423 04/07/19 0656 04/07/19 0734   BP: 96/56   112/56   BP Location:    Right arm   Patient Position:    Lying   Pulse:  78  74   Resp:  18  18   Temp:  98.7 °F (37.1 °C)  98.4 °F (36.9 °C)   TempSrc:  Oral  Oral   SpO2:    94%   Weight:   96.1 kg (211 lb 14.4 oz)    Height:          General:  Pleasant elderly male; no acute distress  Heart:   RRR, S1 S2 normal, no m/r/g  Lungs:   CTAB, no wheezes  Abdomen:  soft, NT, ND, +BS  Extremities: no edema/cyanosis/clubbing  Neuro:  A&Ox3, no focal deficits  Psych:  appropriate mood  Skin:  No bleeding, No bruising, No rash    Pertinent laboratory and radiology data were reviewed:  Microbiology Results (last 10 days)     ** No results found for the last 240 hours. **          Medications Reviewed:     atorvastatin 20 mg Oral Nightly   bisoprolol 5 mg Oral Daily   lisinopril 5 mg Oral Daily   sodium chloride 3 mL Intravenous Q12H        Assessment /Plan   Assessment/Plan:   1. Symptomatic microcytic anemia due to chronic blood loss and iron deficiency  2. Thrombocytosis, suspect reactive, POA, resolved  3. CAD s/p STEMI 2011 s/p stent x 1  4. HTN  5. Chewing tobacco use, x 30years     Hemoglobin improved appropriately. Discussed with Dr. Hinojosa, planning EGD and colonoscopy tomorrow. Patient ok for clear liquids now. Fall precautions. Start IV fluids at MN. NPO at MN. Bedside commode while on colon prep. Check PT/PTT/CBC/BMP in AM.     F/E/N: clear liquids; NPO at MN  DVT PPx: SCDs   GI PPx: not indicated      Reason for continued hospitalization: endoscopies tomorrow  Disposition: discharge in 1-2 days pending endoscopy findings  Discussed with: patient, MANDO Martin, Dr. Ashish Diaz MD   8:52 AM on 4/7/2019

## 2019-04-07 NOTE — PLAN OF CARE
Problem: Patient Care Overview  Goal: Plan of Care Review  Outcome: Ongoing (interventions implemented as appropriate)   04/07/19 0035 04/07/19 1200 04/07/19 1224   Coping/Psychosocial   Plan of Care Reviewed With --  patient --    Plan of Care Review   Progress improving --  --    OTHER   Outcome Summary --  --  Pt given two untis PRBC yesterday tolerated well. Plan for pt to be clear liquids today and NPO at midnight for colonoscopy/EGD. Will being bowel prep and continue to monitor.       Problem: Fall Risk (Adult)  Goal: Absence of Fall  Outcome: Ongoing (interventions implemented as appropriate)      Problem: Anemia (Adult)  Goal: Identify Related Risk Factors and Signs and Symptoms  Outcome: Outcome(s) achieved Date Met: 04/07/19    Goal: Symptom Improvement  Outcome: Ongoing (interventions implemented as appropriate)

## 2019-04-07 NOTE — PLAN OF CARE
Problem: Patient Care Overview  Goal: Plan of Care Review  Outcome: Ongoing (interventions implemented as appropriate)   04/07/19 8234   Coping/Psychosocial   Plan of Care Reviewed With patient   OTHER   Outcome Summary Patient participates well in skilled PT evaluation and demonstrates no skilled PT needs at this time. PT order will be completed.

## 2019-04-07 NOTE — THERAPY EVALUATION
Acute Care - Physical Therapy Initial Evaluation  [facility]     Patient Name: Nilay Odonnell  : 1950  MRN: [id number]  Today's Date: 2019   Onset of Illness/Injury or Date of Surgery: 19  Date of Referral to PT: 19  Referring Physician: Treasure Diaz MD      Admit Date: 2019    Visit Dx:     ICD-10-CM ICD-9-CM   1. Gastrointestinal hemorrhage, unspecified gastrointestinal hemorrhage type K92.2 578.9   2. Impaired functional mobility, balance, gait, and endurance Z74.09 V49.89     Patient Active Problem List   Diagnosis   • Coronary artery disease involving native coronary artery of native heart   • HTN (hypertension)   • Tobacco abuse   • Dyslipidemia   • Gastrointestinal hemorrhage   • Symptomatic anemia   • Thrombocytosis (CMS/HCC)   • Chewing tobacco use     Past Medical History:   Diagnosis Date   • Coronary artery disease    • Dyslipidemia    • Hypertension    • Subsequent ST elevation (STEMI) myocardial infarction of inferior wall (CMS/HCC) 2011    99% RCA (2.75 x 23 Promus post with 3.0 Quantum), 60% OM, LVEF normal.     History reviewed. No pertinent surgical history.     PT ASSESSMENT (last 12 hours)      Physical Therapy Evaluation     Row Name 19 1141          PT Evaluation Time/Intention    Subjective Information  no complaints  -JR     Document Type  discharge evaluation/summary  -JR     Mode of Treatment  physical therapy  -JR     Patient Effort  excellent  -JR     Symptoms Noted During/After Treatment  none  -JR     Comment  Patient reports he feels like he is at his normal fucntional level  -JR     Row Name 19 1141          General Information    Patient Profile Reviewed?  yes  -JR     Onset of Illness/Injury or Date of Surgery  19  -JR     Referring Physician  Treasure Diaz MD  -JR     Patient Observations  alert;agree to therapy;cooperative  -JR     Patient/Family Observations  No family present  -JR     General Observations of  Patient  Supine, in no acute distress  -JR     Prior Level of Function  independent:;all household mobility  -JR     Equipment Currently Used at Home  none Has several canes & walking staffs, but does not use  -JR     Pertinent History of Current Functional Problem  Symptomatic anemia, CAD, HTN, HLD, STEMI 7/29/11  -JR     Risks Reviewed  patient:;increased discomfort  -JR     Benefits Reviewed  patient:;increase balance;increase independence;increase strength;improve function  -JR     Row Name 04/07/19 1141          Relationship/Environment    Lives With  alone  -JR     Row Name 04/07/19 1141          Resource/Environmental Concerns    Current Living Arrangements  home/apartment/condo  -JR     Row Name 04/07/19 1141          Home Main Entrance    Number of Stairs, Main Entrance  one  -JR     Stair Railings, Main Entrance  none  -JR     Stairs Comment, Main Entrance  states he has no difficulty  -JR     Row Name 04/07/19 1141          Cognitive Assessment/Intervention- PT/OT    Orientation Status (Cognition)  oriented x 4  -JR     Follows Commands (Cognition)  WFL  -JR     Row Name 04/07/19 1141          Bed Mobility Assessment/Treatment    Bed Mobility Assessment/Treatment  bed mobility (all) activities  -     [place] Level (Bed Mobility)  independent  -JR     Row Name 04/07/19 1141          Transfer Assessment/Treatment    Transfer Assessment/Treatment  sit-stand transfer;stand-sit transfer  -JR     Sit-Stand [place] (Transfers)  independent  -JR     Stand-Sit [place] (Transfers)  independent  -JR     Row Name 04/07/19 1141          Gait/Stairs Assessment/Training    Gait/Stairs Assessment/Training  gait/ambulation independence  -     [place] Level (Gait)  independent  -     Distance in Feet (Gait)  250  -JR     Pattern (Gait)  step-through  -JR     Comment (Gait/Stairs)  normal gait pattern and speed  -JR     Row Name 04/07/19 1141          General ROM    GENERAL ROM COMMENTS  WFL  -JR      [facility] Name 04/07/19 1141          MMT (Manual Muscle Testing)    General MMT Comments  WFL  -[facility] Name 04/07/19 1141          Pain Assessment    Additional Documentation  Pain Scale: Numbers Pre/Post-Treatment (Group)  -[facility] Name 04/07/19 1141          Pain Scale: Numbers Pre/Post-Treatment    Pain Scale: Numbers, Pretreatment  0/10 - no pain  -     Pain Scale: Numbers, Post-Treatment  0/10 - no pain  -[facility] Name 04/07/19 1141          Coping    Observed Emotional State  pleasant;cooperative  -     Verbalized Emotional State  hopefulness  -[facility] Name 04/07/19 1141          Plan of Care Review    Plan of Care Reviewed With  patient  -[facility] Name 04/07/19 1141          Physical Therapy Clinical Impression    Date of Referral to PT  04/06/19  -     Patient/Family Goals Statement (PT Clinical Impression)  Patient wants to go home  -     Criteria for Skilled Interventions Met (PT Clinical Impression)  no problems identified which require skilled intervention  -     Care Plan Review (PT)  evaluation/treatment results reviewed  -[facility] Name 04/07/19 1141          Positioning and Restraints    Pre-Treatment Position  in bed  -     Post Treatment Position  bed  -     In Bed  supine;call light within reach;encouraged to call for assist  -[facility] Name 04/07/19 1141          Living Environment    Home Accessibility  stairs to enter home  -       User Key  (r) = Recorded By, (t) = Taken By, (c) = Cosigned By    Initials Name Provider Type    Lyric Lynn, PT Physical Therapist        Physical Therapy Education     Title: PT OT SLP Therapies (Done)     Topic: Physical Therapy (Done)     Point: Mobility training (Done)     Learning Progress Summary           Patient Acceptance, E,TB, VU by  at 4/7/2019  3:46 PM    Comment:  Role of PT and safe mobility                               User Key     Initials Effective Dates Name Provider Type Discipline     04/03/18 -  Leon  Lyric, GAVIN Physical Therapist PT              PT Recommendation and Plan  Anticipated Discharge Disposition (PT): home  Therapy Frequency (PT Clinical Impression): evaluation only  Outcome Summary/Treatment Plan (PT)  Anticipated Discharge Disposition (PT): home  Plan of Care Reviewed With: patient  Outcome Summary: Patient participates well in skilled PT evaluation and demonstrates no skilled PT needs at this time.  PT order will be completed.  Outcome Measures     Row Name 04/07/19 1141             How much help from another person do you currently need...    Turning from your back to your side while in flat bed without using bedrails?  4  -JR      Moving from lying on back to sitting on the side of a flat bed without bedrails?  4  -JR      Moving to and from a bed to a chair (including a wheelchair)?  4  -JR      Standing up from a chair using your arms (e.g., wheelchair, bedside chair)?  4  -JR      Climbing 3-5 steps with a railing?  4  -JR      To walk in hospital room?  4  -JR      AM-PAC 6 Clicks Score  24  -         Functional Assessment    Outcome Measure Options  AM-PAC 6 Clicks Basic Mobility (PT)  -        User Key  (r) = Recorded By, (t) = Taken By, (c) = Cosigned By    Initials Name Provider Type    Lyric Lynn, PT Physical Therapist         Time Calculation:   PT Charges     Row Name 04/07/19 1552             Time Calculation    Start Time  1141  -JR      PT Received On  04/07/19  -      PT Goal Re-Cert Due Date  04/17/19  -        User Key  (r) = Recorded By, (t) = Taken By, (c) = Cosigned By    Initials Name Provider Type    Lyric Lynn PT Physical Therapist        Therapy Charges for Today     Code Description Service Date Service Provider Modifiers Qty    40954839928 HC PT EVAL LOW COMPLEXITY 4 4/7/2019 Lyric Quintero, PT GP 1          PT G-Codes  Outcome Measure Options: AM-PAC 6 Clicks Basic Mobility (PT)  AM-PAC 6 Clicks Score: 24      Lyric Quintero PT  4/7/2019

## 2019-04-07 NOTE — H&P
[facility]   HISTORY AND PHYSICAL      Name:  Nilay Odonnell   Age:  69 y.o.  Sex:  male  :  1950  MRN:  [id number]   Visit Number:  [id number]  Admission Date:  2019  Date Of Service:  19  Primary Care Physician:  Michlee Burris MD    Chief Complaint:     Severe anemia    History Of Presenting Illness:      69-year-old white male comes in with having progressive weakness, feeling tired and pale.  Patient developed syncopal episodes and passed out yesterday, was seen in ER and found to have a hemoglobin of 6.5 and Hemoccult positive stools.  Patient denies visible rectal bleeding or dark stools.  No significant heartburn or reflux symptoms.  Patient did have a stent placed 8 years ago, has been on aspirin daily.  He stopped taking Plavix years ago.  Patient has not had a previous colonoscopy or EGD.  No known family history of colon cancer.  No weight loss or abdominal pain.    Review Of Systems:     General ROS: Patient denies any fevers, chills or loss of consciousness.  No complaints of generalized weakness  Psychological ROS: Denies any hallucinations and delusions.  Ophthalmic ROS: no transient loss of vision.  ENT ROS: Denies sore throat, nasal congestion or ear pain.   Allergy and Immunology ROS: Denies rash or itching.  Hematological and Lymphatic ROS: Denies neck swelling or easy bleeding.  Endocrine ROS: Denies any recent unintentional weight gain or loss.  Breast ROS: Denies any pain or swelling.  Respiratory ROS: Denies cough or shortness of breath.   Cardiovascular ROS: Denies chest pain or palpitations. No history of exertional chest pain.   Gastrointestinal ROS: Denies nausea and vomiting. Denies any abdominal pain. No diarrhea.   Genito-Urinary ROS: Denies dysuria or hematuria.  Musculoskeletal ROS: no back pain. No muscle pain. No calf pain.   Neurological ROS: Denies any focal weakness. No loss of consciousness. Denies any numbness.    Dermatological ROS: Denies any redness or pruritis.     Past Medical History:    Past Medical History:   Diagnosis Date   • Coronary artery disease    • Dyslipidemia    • Hypertension    • Subsequent ST elevation (STEMI) myocardial infarction of inferior wall (CMS/[facility]) 07/29/2011    99% RCA (2.75 x 23 Promus post with 3.0 Quantum), 60% OM, LVEF normal.       Past Surgical history:    History reviewed. No pertinent surgical history.    Social History:    Social History     Socioeconomic History   • Marital status: Single     Spouse name: Not on file   • Number of children: Not on file   • Years of education: Not on file   • Highest education level: Not on file   Tobacco Use   • Smoking status: Never Smoker   • Smokeless tobacco: Current User     Types: Snuff   Substance and Sexual Activity   • Alcohol use: No   • Drug use: No   • Sexual activity: Defer       Family History:    Family History   Problem Relation Age of Onset   • Emphysema Father    • No Known Problems Sister        Allergies:      Losartan    Home Medications:    Prior to Admission Medications     Prescriptions Last Dose Informant Patient Reported? Taking?    aspirin 81 MG EC tablet 4/6/2019  Yes Yes    Take 81 mg by mouth daily.    bisoprolol (ZEBETA) 5 MG tablet 4/6/2019  No Yes    Take 1 tablet by mouth daily.    lisinopril (PRINIVIL,ZESTRIL) 10 MG tablet 4/6/2019  No Yes    TAKE ONE TABLET BY MOUTH ONCE DAILY    Patient taking differently:  TAKE HALF A TABLET OF 10 DAILY, FOR A DOSE OF 5MG DAILY    pravastatin (PRAVACHOL) 80 MG tablet 4/5/2019  No Yes    TAKE ONE TABLET BY MOUTH ONCE DAILY EVERY  NIGHT             ED Medications:    Medications   sodium chloride 0.9 % flush 10 mL (not administered)   bisoprolol (ZEBeta) tablet 5 mg (5 mg Oral Given 4/7/19 0826)   lisinopril (PRINIVIL,ZESTRIL) tablet 5 mg (5 mg Oral Given 4/7/19 0826)   atorvastatin (LIPITOR) tablet 20 mg (20 mg Oral Given 4/6/19 2130)   sodium chloride 0.9 % flush 3 mL (3 mL  Intravenous Given 4/7/19 0827)   sodium chloride 0.9 % flush 3-10 mL (not administered)   acetaminophen (TYLENOL) tablet 650 mg (not administered)   ondansetron (ZOFRAN) injection 4 mg (not administered)   dextrose 5 % and sodium chloride 0.45 % infusion (not administered)   iopamidol (ISOVUE-300) 61 % injection 100 mL (100 mL Intravenous Given 4/6/19 1440)       Vital Signs:    Temp:  [97.5 °F (36.4 °C)-98.8 °F (37.1 °C)] 98.4 °F (36.9 °C)  Heart Rate:  [68-96] 74  Resp:  [18-19] 18  BP: ()/(46-68) 112/56        04/06/19  1239 04/06/19  1713 04/07/19  0656   Weight: 95.9 kg (211 lb 6.4 oz) 94.8 kg (209 lb) 96.1 kg (211 lb 14.4 oz)       Body mass index is 30.4 kg/m².    Physical Exam:      General Appearance:  Alert and cooperative, not in any acute distress.   Head:  Atraumatic and normocephalic, without obvious abnormality.   Eyes:          PERRLA, conjunctivae and sclerae normal, no Icterus. No pallor. Extra-occular movements are within normal limits.   Ears:  Ears appear intact with no abnormalities noted.   Throat: No oral lesions, no thrush, oral mucosa moist.   Neck: Supple, trachea midline, no thyromegaly, no carotid bruit.       Respiratory/Lungs:   Breath sounds heard bilaterally equally.  No crackles or wheezing. No Pleural rub or bronchial breathing. Normal respiratory effort.    Cardiovascular/Heart:  Normal S1 and S2, no murmur. No edema   GI/Abdomen:   No masses, no hepatosplenomegaly. Soft, non-tender, non-distended, no hernia                 Musculoskeletal/ Extremities:   Moves all extremities well   Pulses: Pulses palpable and equal bilaterally   Skin: No bleeding, bruising or rash, no induration   Lymph nodes: No palpable adenopathy   Psychiatric : Alert and oriented ×3.  No depression or anxiety            EKG:      Normal    Labs:    Lab Results (last 24 hours)     Procedure Component Value Units Date/Time    Basic Metabolic Panel [[id number]]  (Abnormal) Collected:  04/07/19 0605     Specimen:  Blood Updated:  04/07/19 0713     Glucose 91 mg/dL      BUN 11 mg/dL      Creatinine 1.10 mg/dL      Sodium 138 mmol/L      Potassium 4.2 mmol/L      Chloride 106 mmol/L      CO2 23.0 mmol/L      Calcium 9.0 mg/dL      eGFR Non African Amer 66 mL/min/1.73      BUN/Creatinine Ratio 10.0     Anion Gap 13.2 mmol/L     Narrative:       GFR Normal >60  Chronic Kidney Disease <60  Kidney Failure <15    Scan Slide [[id number]] Collected:  04/07/19 0605    Specimen:  Blood Updated:  04/07/19 0653     Anisocytosis Slight/1+     Microcytes Mod/2+     WBC Morphology Normal     Platelet Estimate Increased    CBC Auto Differential [[id number]]  (Abnormal) Collected:  04/07/19 0605    Specimen:  Blood Updated:  04/07/19 0652     WBC 10.93 10*3/mm3      RBC 4.07 10*6/mm3      Hemoglobin 7.9 g/dL      Hematocrit 28.0 %      MCV 68.8 fL      MCH 19.4 pg      MCHC 28.2 g/dL      RDW 21.4 %      RDW-SD 52.5 fl      MPV 9.4 fL      Platelets 402 10*3/mm3      Neutrophil % 74.0 %      Lymphocyte % 15.3 %      Monocyte % 7.2 %      Eosinophil % 2.4 %      Basophil % 0.6 %      Immature Grans % 0.5 %      Neutrophils, Absolute 8.08 10*3/mm3      Lymphocytes, Absolute 1.67 10*3/mm3      Monocytes, Absolute 0.79 10*3/mm3      Eosinophils, Absolute 0.26 10*3/mm3      Basophils, Absolute 0.07 10*3/mm3      Immature Grans, Absolute 0.06 10*3/mm3      nRBC 0.0 /100 WBC     Hemoglobin & Hematocrit, Blood [[id number]]  (Abnormal) Collected:  04/06/19 2331    Specimen:  Blood Updated:  04/06/19 2342     Hemoglobin 8.2 g/dL      Hematocrit 28.7 %     Iron Profile [[id number]]  (Abnormal) Collected:  04/06/19 1305    Specimen:  Blood Updated:  04/06/19 1728     Iron 22 mcg/dL      TIBC 487 mcg/dL      Iron Saturation 5 %     [place] Draw [[id number]] Collected:  04/06/19 1305    Specimen:  Blood Updated:  04/06/19 1415    Narrative:       The following orders were created for panel order [place] Draw.  Procedure                                Abnormality         Status                     ---------                               -----------         ------                     Light Blue Top[[id number]]                                   Final result               Lavender Top[[id number]]                                     Final result               Gold Top - SST[[id number]]                                   Final result               Green Top (No Gel)[[id number]]                               Final result                 Please view results for these tests on the individual orders.    Light Blue Top [[id number]] Collected:  04/06/19 1305    Specimen:  Blood Updated:  04/06/19 1415     Extra Tube hold for add-on     Comment: Auto resulted       Lavender Top [[id number]] Collected:  04/06/19 1305    Specimen:  Blood Updated:  04/06/19 1415     Extra Tube hold for add-on     Comment: Auto resulted       Gold Top - SST [[id number]] Collected:  04/06/19 1305    Specimen:  Blood Updated:  04/06/19 1415     Extra Tube Hold for add-ons.     Comment: Auto resulted.       Green Top (No Gel) [[id number]] Collected:  04/06/19 1305    Specimen:  Blood Updated:  04/06/19 1415     Extra Tube Hold for add-ons.     Comment: Auto resulted.       CBC Auto Differential [[id number]]  (Abnormal) Collected:  04/06/19 1305    Specimen:  Blood Updated:  04/06/19 1339     WBC 11.09 10*3/mm3      RBC 3.87 10*6/mm3      Hemoglobin 6.5 g/dL      Hematocrit 25.7 %      MCV 66.4 fL      MCH 16.8 pg      MCHC 25.3 g/dL      RDW 19.2 %      RDW-SD 45.3 fl      MPV 8.8 fL      Platelets 523 10*3/mm3      Neutrophil % 75.4 %      Lymphocyte % 15.1 %      Monocyte % 6.6 %      Eosinophil % 1.9 %      Basophil % 0.5 %      Immature Grans % 0.5 %      Neutrophils, Absolute 8.36 10*3/mm3      Lymphocytes, Absolute 1.68 10*3/mm3      Monocytes, Absolute 0.73 10*3/mm3      Eosinophils, Absolute 0.21 10*3/mm3      Basophils, Absolute 0.06 10*3/mm3      Immature Grans, Absolute 0.05 10*3/mm3      nRBC 0.0  /100 WBC     Scan Slide [[id number]] Collected:  04/06/19 1305    Specimen:  Blood Updated:  04/06/19 1339     Anisocytosis Mod/2+     Hypochromia Large/3+     Ovalocytes Slight/1+     Poikilocytes Mod/2+     Target Cells Slight/1+     WBC Morphology Normal     Platelet Morphology Normal    Occult Blood X 1, Stool - Stool, Per Rectum [[id number]]  (Abnormal) Collected:  04/06/19 1323    Specimen:  Stool from Per Rectum Updated:  04/06/19 1328     Fecal Occult Blood Positive    Comprehensive Metabolic Panel [[id number]]  (Abnormal) Collected:  04/06/19 1305    Specimen:  Blood Updated:  04/06/19 1327     Glucose 139 mg/dL      BUN 14 mg/dL      Creatinine 1.20 mg/dL      Sodium 139 mmol/L      Potassium 4.0 mmol/L      Chloride 103 mmol/L      CO2 24.0 mmol/L      Calcium 9.8 mg/dL      Total Protein 7.3 g/dL      Albumin 4.30 g/dL      ALT (SGPT) 11 U/L      AST (SGOT) 16 U/L      Alkaline Phosphatase 65 U/L      Total Bilirubin 0.3 mg/dL      eGFR Non African Amer 60 mL/min/1.73      Globulin 3.0 gm/dL      A/G Ratio 1.4 g/dL      BUN/Creatinine Ratio 11.7     Anion Gap 16.0 mmol/L     Narrative:       GFR Normal >60  Chronic Kidney Disease <60  Kidney Failure <15    Protime-INR [[id number]]  (Normal) Collected:  04/06/19 1305    Specimen:  Blood Updated:  04/06/19 1327     Protime 14.5 Seconds      INR 1.10    Narrative:       Suggested INR therapeutic range for stable oral anticoagulant therapy:    Low Intensity therapy:   1.5-2.0  Moderate Intensity therapy:   2.0-3.0  High Intensity therapy:   2.5-4.0          Radiology:    Imaging Results (last 72 hours)     Procedure Component Value Units Date/Time    CT Abdomen Pelvis With Contrast [[id number]] Collected:  04/06/19 1452     Updated:  04/06/19 1456    Narrative:       EXAM: CT ABDOMEN PELVIS W CONTRAST-     INDICATION: gi bleed     TECHNIQUE:  Thin section axial images were obtained from the lung bases  to the pubic symphysis following IV contrast administration.   Coronal  reconstruction images were obtained from the axial data.     COMPARISON: None.     FINDINGS:      ABDOMEN: The lung bases are clear.   The liver is homogeneous without  focal hepatic lesion or intrahepatic biliary dilatation.  The  gallbladder is present.   The spleen, adrenal glands and pancreas are  within normal limits.   There are bilateral parapelvic renal cysts. The  kidneys are otherwise unremarkable.   The abdominal portions of the GI  tract are without acute abnormality. There is no evidence of small bowel  obstruction.   There is no abdominal ascites. There is very mild  abnormal attenuation at the root of the mesentery which is nonspecific.  There is no lymphadenopathy.     PELVIS: The prostate and urinary bladder are unremarkable.   The  appendix is normal. The pelvic GI tract is without acute abnormality.  There is a fat-containing left inguinal hernia.   There is no pelvic  lymphadenopathy or pelvic free fluid.  No acute osseous abnormalities  are identified.       Impression:       No CT evidence of acute intraabdominal or intrapelvic  abnormality.     Fat-containing left inguinal hernia.                    This study was performed with techniques to keep radiation doses as low  as reasonably achievable (ALARA). Individualized dose reduction  techniques using automated exposure control or adjustment of mA and/or  kV according to the patient size were employed.      This report was finalized on 4/6/2019 2:54 PM by Nimo Quintana MD.          Assessment:    Severe anemia with guaiac positive Hemoccult stool.  No previous GI workup.    Plan:     Plans for EGD and colonoscopy tomorrow.  Risk of bleeding and perforation discussed and patient agreeable.  He will undergo bowel prep today.    Kenneth Hinojosa MD  04/07/19  9:25 AM

## 2019-04-08 ENCOUNTER — ANESTHESIA (OUTPATIENT)
Dept: GASTROENTEROLOGY | Facility: HOSPITAL | Age: 69
End: 2019-04-08

## 2019-04-08 ENCOUNTER — ANESTHESIA EVENT (OUTPATIENT)
Dept: GASTROENTEROLOGY | Facility: HOSPITAL | Age: 69
End: 2019-04-08

## 2019-04-08 LAB
ABO + RH BLD: NORMAL
ABO + RH BLD: NORMAL
ANION GAP SERPL CALCULATED.3IONS-SCNC: 16.1 MMOL/L (ref 10–20)
APTT PPP: 34.2 SECONDS (ref 24.5–37.2)
BH BB BLOOD EXPIRATION DATE: NORMAL
BH BB BLOOD EXPIRATION DATE: NORMAL
BH BB BLOOD TYPE BARCODE: 6200
BH BB BLOOD TYPE BARCODE: 6200
BH BB DISPENSE STATUS: NORMAL
BH BB DISPENSE STATUS: NORMAL
BH BB PRODUCT CODE: NORMAL
BH BB PRODUCT CODE: NORMAL
BH BB UNIT NUMBER: NORMAL
BH BB UNIT NUMBER: NORMAL
BUN BLD-MCNC: 11 MG/DL (ref 7–20)
BUN/CREAT SERPL: 10 (ref 6.3–21.9)
CALCIUM SPEC-SCNC: 9.1 MG/DL (ref 8.4–10.2)
CHLORIDE SERPL-SCNC: 105 MMOL/L (ref 98–107)
CO2 SERPL-SCNC: 22 MMOL/L (ref 26–30)
CREAT BLD-MCNC: 1.1 MG/DL (ref 0.6–1.3)
CROSSMATCH INTERPRETATION: NORMAL
CROSSMATCH INTERPRETATION: NORMAL
DEPRECATED RDW RBC AUTO: 53.7 FL (ref 37–54)
ERYTHROCYTE [DISTWIDTH] IN BLOOD BY AUTOMATED COUNT: 21.9 % (ref 12.3–15.4)
GFR SERPL CREATININE-BSD FRML MDRD: 66 ML/MIN/1.73
GLUCOSE BLD-MCNC: 108 MG/DL (ref 74–98)
HCT VFR BLD AUTO: 28.6 % (ref 37.5–51)
HCT VFR BLD AUTO: 28.7 % (ref 37.5–51)
HCT VFR BLD AUTO: 28.8 % (ref 37.5–51)
HGB BLD-MCNC: 8 G/DL (ref 13–17.7)
HGB BLD-MCNC: 8 G/DL (ref 13–17.7)
HGB BLD-MCNC: 8.1 G/DL (ref 13–17.7)
INR PPP: 1.19 (ref 0.9–1.1)
MCH RBC QN AUTO: 19.4 PG (ref 26.6–33)
MCHC RBC AUTO-ENTMCNC: 28 G/DL (ref 31.5–35.7)
MCV RBC AUTO: 69.4 FL (ref 79–97)
PLATELET # BLD AUTO: 414 10*3/MM3 (ref 140–450)
PMV BLD AUTO: 9.4 FL (ref 6–12)
POTASSIUM BLD-SCNC: 4.1 MMOL/L (ref 3.5–5.1)
PROTHROMBIN TIME: 15.4 SECONDS (ref 12–15.1)
RBC # BLD AUTO: 4.12 10*6/MM3 (ref 4.14–5.8)
SODIUM BLD-SCNC: 139 MMOL/L (ref 137–145)
UNIT  ABO: NORMAL
UNIT  ABO: NORMAL
UNIT  RH: NORMAL
UNIT  RH: NORMAL
WBC NRBC COR # BLD: 12.42 10*3/MM3 (ref 3.4–10.8)

## 2019-04-08 PROCEDURE — 85014 HEMATOCRIT: CPT | Performed by: NURSE PRACTITIONER

## 2019-04-08 PROCEDURE — 85018 HEMOGLOBIN: CPT | Performed by: NURSE PRACTITIONER

## 2019-04-08 PROCEDURE — 25010000002 PHENYLEPHRINE PER 1 ML: Performed by: NURSE ANESTHETIST, CERTIFIED REGISTERED

## 2019-04-08 PROCEDURE — 85027 COMPLETE CBC AUTOMATED: CPT | Performed by: HOSPITALIST

## 2019-04-08 PROCEDURE — 63710000001 ATORVASTATIN 20 MG TABLET: Performed by: HOSPITALIST

## 2019-04-08 PROCEDURE — 99224 PR SBSQ OBSERVATION CARE/DAY 15 MINUTES: CPT | Performed by: NURSE PRACTITIONER

## 2019-04-08 PROCEDURE — G0378 HOSPITAL OBSERVATION PER HR: HCPCS

## 2019-04-08 PROCEDURE — 80048 BASIC METABOLIC PNL TOTAL CA: CPT | Performed by: HOSPITALIST

## 2019-04-08 PROCEDURE — 85610 PROTHROMBIN TIME: CPT | Performed by: HOSPITALIST

## 2019-04-08 PROCEDURE — 25010000002 PROPOFOL 200 MG/20ML EMULSION: Performed by: NURSE ANESTHETIST, CERTIFIED REGISTERED

## 2019-04-08 PROCEDURE — A9270 NON-COVERED ITEM OR SERVICE: HCPCS | Performed by: HOSPITALIST

## 2019-04-08 PROCEDURE — 85730 THROMBOPLASTIN TIME PARTIAL: CPT | Performed by: HOSPITALIST

## 2019-04-08 DEVICE — DEV CLIP GI QUICKCLIPPRO FIX ROT 2300MM: Type: IMPLANTABLE DEVICE | Site: DUODENUM | Status: FUNCTIONAL

## 2019-04-08 RX ORDER — FLUTICASONE PROPIONATE 50 MCG
1-2 SPRAY, SUSPENSION (ML) NASAL DAILY
COMMUNITY
End: 2019-10-30

## 2019-04-08 RX ORDER — SODIUM CHLORIDE, SODIUM LACTATE, POTASSIUM CHLORIDE, CALCIUM CHLORIDE 600; 310; 30; 20 MG/100ML; MG/100ML; MG/100ML; MG/100ML
1000 INJECTION, SOLUTION INTRAVENOUS CONTINUOUS
Status: DISCONTINUED | OUTPATIENT
Start: 2019-04-08 | End: 2019-04-09 | Stop reason: HOSPADM

## 2019-04-08 RX ORDER — PANTOPRAZOLE SODIUM 40 MG/10ML
40 INJECTION, POWDER, LYOPHILIZED, FOR SOLUTION INTRAVENOUS EVERY 12 HOURS SCHEDULED
Status: DISCONTINUED | OUTPATIENT
Start: 2019-04-08 | End: 2019-04-09 | Stop reason: HOSPADM

## 2019-04-08 RX ORDER — MAGNESIUM HYDROXIDE 1200 MG/15ML
LIQUID ORAL AS NEEDED
Status: DISCONTINUED | OUTPATIENT
Start: 2019-04-08 | End: 2019-04-08 | Stop reason: HOSPADM

## 2019-04-08 RX ORDER — PROPOFOL 10 MG/ML
INJECTION, EMULSION INTRAVENOUS AS NEEDED
Status: DISCONTINUED | OUTPATIENT
Start: 2019-04-08 | End: 2019-04-08 | Stop reason: SURG

## 2019-04-08 RX ORDER — PANTOPRAZOLE SODIUM 40 MG/10ML
40 INJECTION, POWDER, LYOPHILIZED, FOR SOLUTION INTRAVENOUS
Status: DISCONTINUED | OUTPATIENT
Start: 2019-04-08 | End: 2019-04-08

## 2019-04-08 RX ORDER — SODIUM CHLORIDE 0.9 % (FLUSH) 0.9 %
3 SYRINGE (ML) INJECTION AS NEEDED
Status: DISCONTINUED | OUTPATIENT
Start: 2019-04-08 | End: 2019-04-08 | Stop reason: HOSPADM

## 2019-04-08 RX ADMIN — PROPOFOL 30 MG: 10 INJECTION, EMULSION INTRAVENOUS at 11:29

## 2019-04-08 RX ADMIN — PHENYLEPHRINE HYDROCHLORIDE 100 MCG: 10 INJECTION INTRAVENOUS at 11:24

## 2019-04-08 RX ADMIN — PROPOFOL 30 MG: 10 INJECTION, EMULSION INTRAVENOUS at 11:27

## 2019-04-08 RX ADMIN — PROPOFOL 30 MG: 10 INJECTION, EMULSION INTRAVENOUS at 11:20

## 2019-04-08 RX ADMIN — ATORVASTATIN CALCIUM 20 MG: 20 TABLET, FILM COATED ORAL at 20:33

## 2019-04-08 RX ADMIN — PROPOFOL 50 MG: 10 INJECTION, EMULSION INTRAVENOUS at 11:17

## 2019-04-08 RX ADMIN — SODIUM CHLORIDE, PRESERVATIVE FREE 3 ML: 5 INJECTION INTRAVENOUS at 20:34

## 2019-04-08 RX ADMIN — SODIUM CHLORIDE, POTASSIUM CHLORIDE, SODIUM LACTATE AND CALCIUM CHLORIDE: 600; 310; 30; 20 INJECTION, SOLUTION INTRAVENOUS at 11:09

## 2019-04-08 RX ADMIN — PROPOFOL 30 MG: 10 INJECTION, EMULSION INTRAVENOUS at 11:34

## 2019-04-08 RX ADMIN — PANTOPRAZOLE SODIUM 40 MG: 40 INJECTION, POWDER, FOR SOLUTION INTRAVENOUS at 20:33

## 2019-04-08 RX ADMIN — DEXTROSE AND SODIUM CHLORIDE 75 ML/HR: 5; 450 INJECTION, SOLUTION INTRAVENOUS at 00:17

## 2019-04-08 RX ADMIN — PROPOFOL 50 MG: 10 INJECTION, EMULSION INTRAVENOUS at 11:13

## 2019-04-08 RX ADMIN — SODIUM CHLORIDE, POTASSIUM CHLORIDE, SODIUM LACTATE AND CALCIUM CHLORIDE 1000 ML: 600; 310; 30; 20 INJECTION, SOLUTION INTRAVENOUS at 09:48

## 2019-04-08 RX ADMIN — PROPOFOL 30 MG: 10 INJECTION, EMULSION INTRAVENOUS at 11:23

## 2019-04-08 NOTE — PROGRESS NOTES
PROGRESS NOTE        Date of Admission: 4/6/2019  Length of Stay: 0  Primary Care Physician: Michele Burris MD    Subjective   Chief Complaint: GI bleed follow up  HPI: This is a 69-year-old male with past medical history significant for coronary artery disease, hypertension who was admitted on 4/6/2019 from the emergency room.  According to the patient had labs done by his primary care provider was notified to come to the emergency room for anemia.  He had been feeling tired and fatigued.  He denied any chest pain or shortness of breath.  Upon evaluation in the emergency room he was noted to have a hemoglobin of 6.5.  He does take aspirin 81 mg daily.  He denies any abdominal pain, NSAID use, alcohol use, melena, or bright red blood per rectum.  He denied any weight loss.  He was admitted to the hospitalist service where he did get 2 units of packed red blood cells.  General surgery was consulted and did do an EGD and colonoscopy today.  His colonoscopy did show a localized area of nodular and ulcerated mucosa that was biopsied as well as a 5 mm polyp in the sigmoid colon.  His upper endoscopy did show 1 oozing cratered duodenal ulcer with a clot that was found in the duodenal bulb.  He did have a hemostatic clip placed.  There was also some localized inflammation and congestion as well as friability in the gastric antrum for which biopsies were taken.  I have seen and evaluated patient pre-and postprocedure.  He is resting comfortably in bed.  We will continue to monitor his hemoglobin hematocrit and keep him on clear liquids today.  We will plan to discharge him home tomorrow if his hemoglobin remains stable.  He is to follow-up with general surgery in 1 week.           Review Of Systems:   Review of Systems   General ROS: Patient denies any fevers, chills or loss of consciousness.    ENT ROS: Denies sore throat, nasal congestion or ear pain.   Hematological and Lymphatic ROS: Denies neck swelling or easy  bleeding.  Endocrine ROS: Denies any recent unintentional weight gain or loss.  Respiratory ROS: Denies cough or shortness of breath.   Cardiovascular ROS: Denies chest pain or palpitations. No history of exertional chest pain.   Gastrointestinal ROS: Denies nausea and vomiting. Denies any abdominal pain. No diarrhea.  Genito-Urinary ROS: Denies dysuria or hematuria.  Musculoskeletal ROS: Denies back pain. No muscle pain. No calf pain.   Neurological ROS: Denies any focal weakness. No loss of consciousness. Denies any numbness. Denies neck pain.   Dermatological ROS: Denies any redness or pruritis.    Objective      Temp:  [98.1 °F (36.7 °C)-98.7 °F (37.1 °C)] 98.5 °F (36.9 °C)  Heart Rate:  [65-75] 72  Resp:  [17-22] 18  BP: ()/(58-84) 91/68  Physical Exam    General Appearance:  Alert and cooperative, not in any acute distress. Pale in appearance   Head:  Atraumatic and normocephalic, without obvious abnormality.   Eyes:          PERRLA, conjunctivae and sclerae normal, no Icterus. No pallor. Extraocular movements are within normal limits.   Ears:  Ears appear intact with no abnormalities noted.   Throat: No oral lesions, no thrush, oral mucosa moist.   Neck: Supple, trachea midline, no thyromegaly, no carotid bruit.       Lungs:   Chest shape is normal. Breath sounds heard bilaterally equally.  No crackles or wheezing. No Pleural rub or bronchial breathing.   Heart:  Normal S1 and S2, no murmur, no gallop, no rub. No JVD   Abdomen:   Normal bowel sounds, no masses, no organomegaly. Soft    non-tender, non-distended, no guarding, no rebound             tenderness   Extremities: Moves all extremities well, no edema, no cyanosis, no clubbing.   Pulses: Pulses palpable and equal bilaterally   Skin: No bleeding, bruising or rash, pale   Lymph nodes: No palpable adenopathy   Neurologic:    Psychiatric/Behavior:     Cranial nerves 2 - 12 grossly intact, sensation intact, Motor power is normal and equal  bilaterally.  Mood normal, behavior normal       Results Review:    I have reviewed the labs, radiology results and diagnostic studies.    Results from last 7 days   Lab Units 04/08/19  0542   WBC 10*3/mm3 12.42*   HEMOGLOBIN g/dL 8.0*   PLATELETS 10*3/mm3 414     Results from last 7 days   Lab Units 04/08/19  0542   SODIUM mmol/L 139   POTASSIUM mmol/L 4.1   CO2 mmol/L 22.0*   CREATININE mg/dL 1.10   GLUCOSE mg/dL 108*       Culture Data:    Radiology Data:   Cardiology Data:    I have reviewed the medications.    Assessment/Plan     Assessment and Plan:  1.  GI bleed status post upper and lower endoscopy.  Secondary to duodenal ulcer-patient has been placed on Protonix 40 mg IV twice daily.  Biopsies have been sent.  He is status post clip placement.  He should not have an MRI for 1 month.    2.  Acute blood loss anemia status post 2 units of packed red blood cells.  I will repeat hemoglobin this afternoon and transfuse if indicated.    3.  Chronic essential hypertension    4.  History of smokeless tobacco use times 30 years-counseling    5.  Coronary artery disease status post stent placement-medical management    6.  Nodular and ulcerated mucosa in the ascending colon status post biopsy-will need to follow-up with Dr. vazquez in 1 week.        DVT prophylaxis: SCDs  Discharge Planning:   Radha Mcdaniel, KAI 04/08/19 1:02 PM    Please note that portions of this note may have been completed with a voice recognition program. Efforts were made to edit the dictations, but occasionally words are mistranscribed.

## 2019-04-08 NOTE — PLAN OF CARE
Problem: Patient Care Overview  Goal: Plan of Care Review  Outcome: Ongoing (interventions implemented as appropriate)   04/08/19 0133 04/08/19 1213 04/08/19 1546   Coping/Psychosocial   Plan of Care Reviewed With --  patient --    Plan of Care Review   Progress improving --  --    OTHER   Outcome Summary --  --  Underwent EGD and colonoscopy today, no complaints from pt verbalized. 1 clip placed per EGD with biopsy. No signs of bleeding noted. Pt to remain on clear liquids for today, start GI soft tomorrow. Monitor Hgb. Potential DC tomorrow.       Problem: Fall Risk (Adult)  Goal: Absence of Fall  Outcome: Ongoing (interventions implemented as appropriate)      Problem: Anemia (Adult)  Goal: Symptom Improvement  Outcome: Ongoing (interventions implemented as appropriate)

## 2019-04-08 NOTE — PLAN OF CARE
Problem: Patient Care Overview  Goal: Plan of Care Review  Outcome: Ongoing (interventions implemented as appropriate)   04/08/19 0133   Coping/Psychosocial   Plan of Care Reviewed With patient   Plan of Care Review   Progress improving   OTHER   Outcome Summary VSS, DENIES ANY PAIN, UP TO BR FOR FREQUENT BOWEL MOVEMENTS, BOWEL PREP IN PROGRESS.       Problem: Fall Risk (Adult)  Goal: Absence of Fall  Outcome: Ongoing (interventions implemented as appropriate)

## 2019-04-08 NOTE — PROGRESS NOTES
Continued Stay Note   Hu     Patient Name: Nilay Odonnell  MRN: [id number]  Today's Date: 4/8/2019    Admit Date: 4/6/2019    Discharge Plan     Row Name 04/08/19 1613       Plan    Plan  Home    Patient/Family in Agreement with Plan  yes    Plan Comments  F/U with pt re DCP; he is alone.  Pt reports he plans to return home.  He is independent and denies any needs at this time.  CM will continue to follow.        Discharge Codes    No documentation.       Expected Discharge Date and Time     Expected Discharge Date Expected Discharge Time    Apr 8, 2019             Sherrill Burkett

## 2019-04-08 NOTE — ANESTHESIA PREPROCEDURE EVALUATION
Anesthesia Evaluation     Patient summary reviewed and Nursing notes reviewed   no history of anesthetic complications:  NPO Solid Status: > 8 hours  NPO Liquid Status: > 8 hours           Airway   Mallampati: II  TM distance: >3 FB  Neck ROM: full  no difficulty expected  Dental - normal exam     Pulmonary - negative pulmonary ROS and normal exam   Cardiovascular - normal exam    ECG reviewed  Rhythm: regular  Rate: normal    (+) hypertension less than 2 medications, past MI  >12 months, CAD,       Neuro/Psych- negative ROS  GI/Hepatic/Renal/Endo    (+)  GI bleeding,     Musculoskeletal (-) negative ROS    Abdominal    Substance History - negative use     OB/GYN negative ob/gyn ROS         Other - negative ROS       ROS/Med Hx Other: 2015 ECHO EF 55-60%                Anesthesia Plan    ASA 3     MAC   (Pt told that intravenous sedation will be used as the primary anesthetic along with local anesthesia if necessary. Every effort will be made to make sure the patient is comfortable.     The patient was told they may or may not have recall for the procedure. It was further explained that if the MAC was not adequate that a general anesthetic with either an LMA or endotracheal tube would be required.     Will proceed with the plan of care.)  intravenous induction   Anesthetic plan, all risks, benefits, and alternatives have been provided, discussed and informed consent has been obtained with: patient.

## 2019-04-08 NOTE — ANESTHESIA POSTPROCEDURE EVALUATION
Patient: Nilay Odonnell    Procedure Summary     Date:  04/08/19 Room / Location:  [facility] ENDOSCOPY 2 / [facility] ENDOSCOPY    Anesthesia Start:  1109 Anesthesia Stop:  1138    Procedures:       ESOPHAGOGASTRODUODENOSCOPY WITH BIOPSY, QUICK CLIP PRO PLACEMENT (N/A Esophagus)      COLONOSCOPY WITH COLD BIOPSY, HOT BIOPSY POLYPECTOMY (N/A ) Diagnosis:       Gastrointestinal hemorrhage, unspecified gastrointestinal hemorrhage type      (Gastrointestinal hemorrhage, unspecified gastrointestinal hemorrhage type [K92.2])    Surgeon:  Kenneth Hinojosa MD Provider:  Jj Jolley CRNA    Anesthesia Type:  MAC ASA Status:  3          Anesthesia Type: MAC  Last vitals  BP   91/68 (04/08/19 1213)   Temp   98.5 °F (36.9 °C) (04/08/19 1213)   Pulse   72 (04/08/19 1213)   Resp   18 (04/08/19 1213)     SpO2   98 % (04/08/19 1213)     Post Anesthesia Care and Evaluation    Patient location during evaluation: PACU  Patient participation: complete - patient participated  Level of consciousness: awake and alert  Pain management: satisfactory to patient  Airway patency: patent  Anesthetic complications: No anesthetic complications  PONV Status: none  Cardiovascular status: acceptable and hemodynamically stable  Respiratory status: acceptable  Hydration status: acceptable

## 2019-04-08 NOTE — SIGNIFICANT NOTE
04/08/19 0929   Rehab Time/Intention   Evaluation Not Performed other (see comments)  (Per staff pt is ind with functional mobility and ADLs; no IP OT services needed at this time. )   Rehab Treatment   Discipline occupational therapist

## 2019-04-09 VITALS
HEART RATE: 82 BPM | HEIGHT: 70 IN | OXYGEN SATURATION: 95 % | SYSTOLIC BLOOD PRESSURE: 113 MMHG | WEIGHT: 209.31 LBS | RESPIRATION RATE: 18 BRPM | DIASTOLIC BLOOD PRESSURE: 53 MMHG | TEMPERATURE: 97.4 F | BODY MASS INDEX: 29.96 KG/M2

## 2019-04-09 PROBLEM — K63.9 NODULE OF COLON: Status: ACTIVE | Noted: 2019-04-09

## 2019-04-09 PROBLEM — O10.011 CHRONIC BENIGN ESSENTIAL HYPERTENSION IN FIRST TRIMESTER: Status: ACTIVE | Noted: 2019-04-09

## 2019-04-09 PROBLEM — D62 ACUTE BLOOD LOSS ANEMIA: Status: ACTIVE | Noted: 2019-04-09

## 2019-04-09 PROBLEM — K26.9 DUODENAL ULCER: Status: ACTIVE | Noted: 2019-04-09

## 2019-04-09 LAB
ANION GAP SERPL CALCULATED.3IONS-SCNC: 14 MMOL/L (ref 10–20)
BUN BLD-MCNC: 8 MG/DL (ref 7–20)
BUN/CREAT SERPL: 7.3 (ref 6.3–21.9)
CALCIUM SPEC-SCNC: 9.1 MG/DL (ref 8.4–10.2)
CHLORIDE SERPL-SCNC: 108 MMOL/L (ref 98–107)
CO2 SERPL-SCNC: 22 MMOL/L (ref 26–30)
CREAT BLD-MCNC: 1.1 MG/DL (ref 0.6–1.3)
DEPRECATED RDW RBC AUTO: 55.3 FL (ref 37–54)
ERYTHROCYTE [DISTWIDTH] IN BLOOD BY AUTOMATED COUNT: 22.2 % (ref 12.3–15.4)
GFR SERPL CREATININE-BSD FRML MDRD: 66 ML/MIN/1.73
GLUCOSE BLD-MCNC: 88 MG/DL (ref 74–98)
HCT VFR BLD AUTO: 29.5 % (ref 37.5–51)
HGB BLD-MCNC: 8.3 G/DL (ref 13–17.7)
MCH RBC QN AUTO: 19.8 PG (ref 26.6–33)
MCHC RBC AUTO-ENTMCNC: 28.1 G/DL (ref 31.5–35.7)
MCV RBC AUTO: 70.4 FL (ref 79–97)
PLATELET # BLD AUTO: 367 10*3/MM3 (ref 140–450)
PMV BLD AUTO: 8.7 FL (ref 6–12)
POTASSIUM BLD-SCNC: 4 MMOL/L (ref 3.5–5.1)
RBC # BLD AUTO: 4.19 10*6/MM3 (ref 4.14–5.8)
SODIUM BLD-SCNC: 140 MMOL/L (ref 137–145)
WBC NRBC COR # BLD: 9.85 10*3/MM3 (ref 3.4–10.8)

## 2019-04-09 PROCEDURE — G0378 HOSPITAL OBSERVATION PER HR: HCPCS

## 2019-04-09 PROCEDURE — 99217 PR OBSERVATION CARE DISCHARGE MANAGEMENT: CPT | Performed by: NURSE PRACTITIONER

## 2019-04-09 PROCEDURE — A9270 NON-COVERED ITEM OR SERVICE: HCPCS | Performed by: HOSPITALIST

## 2019-04-09 PROCEDURE — 80048 BASIC METABOLIC PNL TOTAL CA: CPT | Performed by: NURSE PRACTITIONER

## 2019-04-09 PROCEDURE — 99212 OFFICE O/P EST SF 10 MIN: CPT | Performed by: SURGERY

## 2019-04-09 PROCEDURE — 85027 COMPLETE CBC AUTOMATED: CPT | Performed by: NURSE PRACTITIONER

## 2019-04-09 PROCEDURE — 63710000001 LISINOPRIL 5 MG TABLET: Performed by: HOSPITALIST

## 2019-04-09 PROCEDURE — 63710000001 BISOPROLOL 5 MG TABLET: Performed by: HOSPITALIST

## 2019-04-09 RX ORDER — PANTOPRAZOLE SODIUM 40 MG/1
40 TABLET, DELAYED RELEASE ORAL DAILY
Qty: 30 TABLET | Refills: 0 | Status: SHIPPED | OUTPATIENT
Start: 2019-04-09 | End: 2019-04-15 | Stop reason: HOSPADM

## 2019-04-09 RX ORDER — ASPIRIN 81 MG/1
81 TABLET ORAL DAILY
Start: 2019-04-09 | End: 2019-10-30

## 2019-04-09 RX ADMIN — PANTOPRAZOLE SODIUM 40 MG: 40 INJECTION, POWDER, FOR SOLUTION INTRAVENOUS at 08:25

## 2019-04-09 RX ADMIN — LISINOPRIL 5 MG: 5 TABLET ORAL at 08:25

## 2019-04-09 RX ADMIN — BISOPROLOL FUMARATE 5 MG: 5 TABLET ORAL at 08:25

## 2019-04-09 NOTE — CONSULTS
Patient: Nilay Odonnell  Procedure(s):  ESOPHAGOGASTRODUODENOSCOPY WITH BIOPSY, QUICK CLIP PRO PLACEMENT  COLONOSCOPY WITH COLD BIOPSY, HOT BIOPSY POLYPECTOMY  Anesthesia type: [unfilled]    Patient location: [facility] Surgical Floor  Last vitals:   Vitals:    04/09/19 0700   BP: 113/53   Pulse: 82   Resp: 18   Temp: 97.4 °F (36.3 °C)   SpO2: 95%     Level of consciousness: awake, alert and oriented    Post-anesthesia pain: adequate analgesia  Airway patency: patent  Respiratory: unassisted  Cardiovascular: stable and blood pressure at baseline  Hydration: euvolemic    Anesthetic complications: no

## 2019-04-09 NOTE — PROGRESS NOTES
LOS: 0 days   Patient Care Team:  Michele Burris MD as PCP - General  Michele Burris MD as PCP - Family Medicine      Chief Complaint: Cecal mass, duodenal ulcer      Interval History: Patient hemodynamically stable, hemoglobin still 8.3.  No further evidence of GI bleed.  Patient likely has a small right colon cancer and duodenal ulcer.  No active bleeding on endoscopy yesterday.  Pathology pending.    Patient Complaints: None    History taken from: patient    Vital Signs  Temp:  [98 °F (36.7 °C)-98.7 °F (37.1 °C)] 98.1 °F (36.7 °C)  Heart Rate:  [] 67  Resp:  [16-22] 18  BP: ()/(58-84) 109/70    Physical Exam:     General Appearance:    Alert, cooperative, in no acute distress   Head:    Normocephalic, without obvious abnormality, atraumatic   Lungs:     Clear to auscultation,respirations regular, even and                  unlabored    Heart:    Regular rhythm and normal rate, normal S1 and S2, no            murmur, no gallop, no rub, no click   Abdomen:     Normal bowel sounds, no masses, no organomegaly, soft        non-tender, non-distended, no guarding, no rebound                tenderness   Extremities:   Moves all extremities well, no edema, no cyanosis, no             redness   Pulses:   Pulses palpable and equal bilaterally   Skin:   No bleeding, bruising or rash        Results Review:       Lab Results (last 24 hours)     Procedure Component Value Units Date/Time    CBC (No Diff) [[id number]]  (Abnormal) Collected:  04/09/19 0607    Specimen:  Blood Updated:  04/09/19 0632     WBC 9.85 10*3/mm3      RBC 4.19 10*6/mm3      Hemoglobin 8.3 g/dL      Hematocrit 29.5 %      MCV 70.4 fL      MCH 19.8 pg      MCHC 28.1 g/dL      RDW 22.2 %      RDW-SD 55.3 fl      MPV 8.7 fL      Platelets 367 10*3/mm3     Basic Metabolic Panel [[id number]] Collected:  04/09/19 0607    Specimen:  Blood Updated:  04/09/19 0624    Hemoglobin & Hematocrit, Blood [[id number]]  (Abnormal) Collected:  04/08/19 2052     Specimen:  Blood Updated:  04/08/19 2117     Hemoglobin 8.1 g/dL      Hematocrit 28.8 %     Hemoglobin & Hematocrit, Blood [[id number]]  (Abnormal) Collected:  04/08/19 1351    Specimen:  Blood Updated:  04/08/19 1408     Hemoglobin 8.0 g/dL      Hematocrit 28.7 %     Tissue Pathology Exam [[id number]] Collected:  04/08/19 1120    Specimen:  Tissue from Gastric, Antrum; Tissue from Large Intestine, Right / Ascending Colon; Polyp from Large Intestine, Left / Descending Colon Updated:  04/08/19 1336              Assessment/Plan       Symptomatic anemia    Gastrointestinal hemorrhage    Thrombocytosis (CMS/HCC)    Chewing tobacco use      Right colon mass, likely carcinoma.  Duodenal ulcer with oozing, no further bleeding.  Okay to discharge home on PPI medication.  Needs follow-up with me next week to review pathology report of cecal mass and possible resection.      Kenneth Hinojosa MD  04/09/19  6:36 AM

## 2019-04-09 NOTE — PLAN OF CARE
Problem: Patient Care Overview  Goal: Plan of Care Review  Outcome: Ongoing (interventions implemented as appropriate)   04/09/19 0455   Coping/Psychosocial   Plan of Care Reviewed With patient   Plan of Care Review   Progress no change   OTHER   Outcome Summary No acute events overnight. patient rested well. Continue to monitor.      Goal: Discharge Needs Assessment  Outcome: Ongoing (interventions implemented as appropriate)      Problem: Fall Risk (Adult)  Goal: Absence of Fall  Outcome: Ongoing (interventions implemented as appropriate)      Problem: Anemia (Adult)  Goal: Symptom Improvement  Outcome: Ongoing (interventions implemented as appropriate)

## 2019-04-09 NOTE — PLAN OF CARE
Problem: Patient Care Overview  Goal: Plan of Care Review  Outcome: Ongoing (interventions implemented as appropriate)   04/09/19 0957   Coping/Psychosocial   Plan of Care Reviewed With patient   Plan of Care Review   Progress improving   OTHER   Outcome Summary Patient to be discharged.

## 2019-04-09 NOTE — DISCHARGE SUMMARY
[facility]   DISCHARGE SUMMARY    Name:  Nilay Odonnell   Age:  69 y.o.  Sex:  male  :  1950  MRN:  [id number]   Visit Number:  [id number]  Primary Care Physician:  Michele Burris MD  Date of Discharge:  2019  Admission Date:  2019      Presenting Problem:    Gastrointestinal hemorrhage, unspecified gastrointestinal hemorrhage type [K92.2]       Discharge Diagnosis:       Acute blood loss anemia    Gastrointestinal hemorrhage    Thrombocytosis (CMS/HCC)    Chewing tobacco use    Chronic benign essential hypertension in first trimester    Nodule of colon    Duodenal ulcer        Past Medical History:  Past Medical History:   Diagnosis Date   • Coronary artery disease    • Dyslipidemia    • Hypertension    • Subsequent ST elevation (STEMI) myocardial infarction of inferior wall (CMS/HCC) 2011    99% RCA (2.75 x 23 Promus post with 3.0 Quantum), 60% OM, LVEF normal.         Consults:     Consults     Date and Time Order Name Status Description    2019 1552 IP General Consult (Use specialty-specific consult if known)            Procedures Performed:    Procedure(s):  ESOPHAGOGASTRODUODENOSCOPY WITH BIOPSY, QUICK CLIP PRO PLACEMENT  COLONOSCOPY WITH COLD BIOPSY, HOT BIOPSY POLYPECTOMY   1122 COLONOSCOPY   1110 UPPER GI ENDOSCOPY      History of presenting illness/Hospital Course:  This is a 69-year-old male with past medical history significant for coronary artery disease, hypertension who was admitted on 2019 from the emergency room.  According to the patient had labs done by his primary care provider was notified to come to the emergency room for anemia.  He had been feeling tired and fatigued.  He denied any chest pain or shortness of breath.  Upon evaluation in the emergency room he was noted to have a hemoglobin of 6.5.  He does take aspirin 81 mg daily.  He denies any abdominal pain, NSAID use, alcohol use, melena, or bright red blood per  rectum.  He denied any weight loss.  He was admitted to the hospitalist service where he did get 2 units of packed red blood cells.  General surgery was consulted and did do an EGD and colonoscopy on 4/8/19.  His colonoscopy did show a localized area of nodular and ulcerated mucosa that was biopsied as well as a 5 mm polyp in the sigmoid colon.  His upper endoscopy did show 1 oozing ulcerated duodenal ulcer with a clot that was found in the duodenal bulb.  He did have a hemostatic clip placed.  There was also some localized inflammation and congestion as well as friability in the gastric antrum for which biopsies were taken.    Patient is to follow-up with Dr. Hinojosa in 1 week for the biopsy results and at that time if it is carcinoma he will plan for resection.  Given that he did have a clip placed he cannot have an MRI for 30 days from the date of the procedure on 4/8/2019.    I have seen and evaluated patient at bedside today.  He has not required any further blood transfusions.  His hemoglobin is stable at 8.3.  He denies any chest pain, abdominal pain, nausea vomiting.  We will plan to discharge the patient home today to follow-up with Dr. Hinojosa in his office on Monday.  Will also plan for CBC on Monday.  Patient has been instructed no MRI for 30 days.  He is to hold his aspirin until Monday.  He is otherwise hemodynamically stable from the hospitalist service for discharge home with appropriate follow-up.  Patient is tolerating diet without any complications.  He any hematemesis, melena or hematochezia.  He has been cleared from the surgical standpoint for discharge as well.    Vital Signs:    Temp:  [97.4 °F (36.3 °C)-98.7 °F (37.1 °C)] 97.4 °F (36.3 °C)  Heart Rate:  [] 82  Resp:  [16-22] 18  BP: ()/(53-84) 113/53    Physical Exam:  General Appearance:    Alert and cooperative, not in any acute distress.   Head:    Atraumatic and normocephalic, without obvious abnormality.   Eyes:             PERRLA, conjunctivae and sclerae normal, no Icterus. No pallor. Extra-occular movements are within normal limits.   Ears:    Ears appear intact with no abnormalities noted.   Throat:   No oral lesions, no thrush, oral mucosa moist.   Neck:   Supple, trachea midline, no thyromegaly, no carotid bruit.           Lungs:     Chest shape is normal. Breath sounds heard bilaterally equally.  No crackles or wheezing. No Pleural rub or bronchial breathing.    Heart:    Normal S1 and S2, no murmur, no gallop, no rub. No JVD   Abdomen:     Normal bowel sounds, no masses, no organomegaly. Soft        non-tender, non-distended, no guarding, no rebound                tenderness   Extremities:   Moves all extremities well, no edema, no cyanosis, no             clubbing   Pulses:   Pulses palpable and equal bilaterally   Skin:   No bleeding, bruising or rash     Psychiatric/Behavior:       Normal mood, normal behavior   Neurologic:   Cranial nerves 2 - 12 grossly intact, sensation intact, Motor power is normal and equal bilaterally.           Pertinent Lab Results:     Results from last 7 days   Lab Units 04/09/19  0607 04/08/19  0542 04/07/19  0605 04/06/19  1305   SODIUM mmol/L 140 139 138 139   POTASSIUM mmol/L 4.0 4.1 4.2 4.0   CHLORIDE mmol/L 108* 105 106 103   CO2 mmol/L 22.0* 22.0* 23.0* 24.0*   BUN mg/dL 8 11 11 14   CREATININE mg/dL 1.10 1.10 1.10 1.20   CALCIUM mg/dL 9.1 9.1 9.0 9.8   BILIRUBIN mg/dL  --   --   --  0.3   ALK PHOS U/L  --   --   --  65   ALT (SGPT) U/L  --   --   --  11*   AST (SGOT) U/L  --   --   --  16   GLUCOSE mg/dL 88 108* 91 139*     Results from last 7 days   Lab Units 04/09/19  0607 04/08/19  2052 04/08/19  1351 04/08/19  0542 04/07/19  0605   WBC 10*3/mm3 9.85  --   --  12.42* 10.93*   HEMOGLOBIN g/dL 8.3* 8.1* 8.0* 8.0* 7.9*   HEMATOCRIT % 29.5* 28.8* 28.7* 28.6* 28.0*   PLATELETS 10*3/mm3 367  --   --  414 402     Results from last 7 days   Lab Units 04/08/19  0542 04/06/19  1305   INR  1.19*  1.10            Pertinent Radiology Results:    Imaging Results (all)     Procedure Component Value Units Date/Time    CT Abdomen Pelvis With Contrast [[id number]] Collected:  04/06/19 1452     Updated:  04/06/19 1456    Narrative:       EXAM: CT ABDOMEN PELVIS W CONTRAST-     INDICATION: gi bleed     TECHNIQUE:  Thin section axial images were obtained from the lung bases  to the pubic symphysis following IV contrast administration.  Coronal  reconstruction images were obtained from the axial data.     COMPARISON: None.     FINDINGS:      ABDOMEN: The lung bases are clear.   The liver is homogeneous without  focal hepatic lesion or intrahepatic biliary dilatation.  The  gallbladder is present.   The spleen, adrenal glands and pancreas are  within normal limits.   There are bilateral parapelvic renal cysts. The  kidneys are otherwise unremarkable.   The abdominal portions of the GI  tract are without acute abnormality. There is no evidence of small bowel  obstruction.   There is no abdominal ascites. There is very mild  abnormal attenuation at the root of the mesentery which is nonspecific.  There is no lymphadenopathy.     PELVIS: The prostate and urinary bladder are unremarkable.   The  appendix is normal. The pelvic GI tract is without acute abnormality.  There is a fat-containing left inguinal hernia.   There is no pelvic  lymphadenopathy or pelvic free fluid.  No acute osseous abnormalities  are identified.       Impression:       No CT evidence of acute intraabdominal or intrapelvic  abnormality.     Fat-containing left inguinal hernia.                    This study was performed with techniques to keep radiation doses as low  as reasonably achievable (ALARA). Individualized dose reduction  techniques using automated exposure control or adjustment of mA and/or  kV according to the patient size were employed.      This report was finalized on 4/6/2019 2:54 PM by Nimo Quintana MD.          Condition on Discharge:     Stable        Discharge Disposition:    Home or Self Care    Discharge Medication:       Discharge Medications      New Medications      Instructions Start Date   pantoprazole 40 MG EC tablet  Commonly known as:  PROTONIX   40 mg, Oral, Daily         Changes to Medications      Instructions Start Date   aspirin 81 MG EC tablet  What changed:  additional instructions   81 mg, Oral, Daily, Hold aspirin until Monday      lisinopril 10 MG tablet  Commonly known as:  PRINIVILZESTRIL  What changed:    · how much to take  · how to take this  · when to take this   TAKE ONE TABLET BY MOUTH ONCE DAILY         Continue These Medications      Instructions Start Date   bisoprolol 5 MG tablet  Commonly known as:  ZEBeta   5 mg, Oral, Daily      fluticasone 50 MCG/ACT nasal spray  Commonly known as:  FLONASE   1-2 sprays, Nasal, Daily      pravastatin 80 MG tablet  Commonly known as:  PRAVACHOL   TAKE ONE TABLET BY MOUTH ONCE DAILY EVERY  NIGHT             Discharge Diet:     Diet Instructions     Diet: Regular, Soft Texture; Thin Liquids, No Restrictions; Whole; Thin      Discharge Diet:   Regular  Soft Texture       Fluid Consistency:  Thin Liquids, No Restrictions    Soft Options:  Whole    Fluid Consistency:  Thin          Activity at Discharge:     Activity Instructions     Discharge Activity      As tolerates          Follow-up Appointments:  Dr. Hinojosa on Monday  PCP in 2 weeks      No future appointments.  Additional Instructions for the Follow-ups that You Need to Schedule     CBC & Differential    Apr 15, 2019 (Approximate)      Results to Dr. Hinojosa and Dr. Burris    Order Comments:  Results to Dr. Hinojosa and Dr. Burris     Manual Differential:  No               Discharge Instructions:  Hold aspirin until Monday  CBC on Monday   No MRI for 30 days from date of 4/8/19        Test Results Pending at Discharge:     Order Current Status    Tissue Pathology Exam In process             Radha Mcdaniel  KAI  04/09/19  9:56 AM    Time:  35  minutes were spent reviewing labs, history, evaluating patient and discharge planning.        Please note that portions of this note may have been completed with a voice recognition program. Efforts were made to edit the dictations, but occasionally words are mistranscribed.

## 2019-04-12 LAB
LAB AP CASE REPORT: NORMAL
PATH REPORT.FINAL DX SPEC: NORMAL

## 2019-04-15 ENCOUNTER — OFFICE VISIT (OUTPATIENT)
Dept: SURGERY | Facility: CLINIC | Age: 69
End: 2019-04-15

## 2019-04-15 VITALS
HEIGHT: 70 IN | OXYGEN SATURATION: 97 % | HEART RATE: 67 BPM | SYSTOLIC BLOOD PRESSURE: 112 MMHG | DIASTOLIC BLOOD PRESSURE: 68 MMHG | TEMPERATURE: 97 F | WEIGHT: 215 LBS | BODY MASS INDEX: 30.78 KG/M2

## 2019-04-15 DIAGNOSIS — K21.00 GASTROESOPHAGEAL REFLUX DISEASE WITH ESOPHAGITIS: ICD-10-CM

## 2019-04-15 DIAGNOSIS — B96.89 GASTRITIS DUE TO HELICOBACTER HEILMANNII: ICD-10-CM

## 2019-04-15 DIAGNOSIS — K29.60 GASTRITIS DUE TO HELICOBACTER HEILMANNII: ICD-10-CM

## 2019-04-15 DIAGNOSIS — C18.2 MALIGNANT NEOPLASM OF ASCENDING COLON (HCC): Primary | ICD-10-CM

## 2019-04-15 PROCEDURE — 99213 OFFICE O/P EST LOW 20 MIN: CPT | Performed by: SURGERY

## 2019-04-15 RX ORDER — POLYETHYLENE GLYCOL 1450
1 POWDER (GRAM) MISCELLANEOUS
Qty: 238 G | Refills: 0 | Status: SHIPPED | OUTPATIENT
Start: 2019-04-15 | End: 2019-04-15

## 2019-04-15 RX ORDER — CLARITHROMYCIN 500 MG/1
500 TABLET, COATED ORAL 2 TIMES DAILY
Qty: 28 TABLET | Refills: 0 | Status: SHIPPED | OUTPATIENT
Start: 2019-04-15 | End: 2019-05-07 | Stop reason: HOSPADM

## 2019-04-15 RX ORDER — BISACODYL 5 MG/1
10 TABLET, DELAYED RELEASE ORAL 2 TIMES DAILY
Qty: 4 TABLET | Refills: 0 | Status: SHIPPED | OUTPATIENT
Start: 2019-04-15 | End: 2019-04-16

## 2019-04-15 RX ORDER — LANSOPRAZOLE, AMOXICILLIN, CLARITHROMYCIN 30-500-500
KIT ORAL 2 TIMES DAILY
Qty: 14 KIT | Refills: 0 | Status: SHIPPED | OUTPATIENT
Start: 2019-04-15 | End: 2019-05-07 | Stop reason: HOSPADM

## 2019-04-15 RX ORDER — OMEPRAZOLE 20 MG/1
20 CAPSULE, DELAYED RELEASE ORAL EVERY 12 HOURS
Qty: 28 CAPSULE | Refills: 0 | Status: ON HOLD | OUTPATIENT
Start: 2019-04-15 | End: 2019-04-29

## 2019-04-15 RX ORDER — METRONIDAZOLE 500 MG/1
500 TABLET ORAL 2 TIMES DAILY
Qty: 28 TABLET | Refills: 0 | Status: SHIPPED | OUTPATIENT
Start: 2019-04-15 | End: 2019-05-07 | Stop reason: HOSPADM

## 2019-04-15 RX ORDER — AMOXICILLIN 500 MG/1
1000 CAPSULE ORAL 2 TIMES DAILY
Qty: 56 CAPSULE | Refills: 0 | Status: SHIPPED | OUTPATIENT
Start: 2019-04-15 | End: 2019-05-07 | Stop reason: HOSPADM

## 2019-04-15 NOTE — PROGRESS NOTES
Patient: Nilay Odonnell    YOB: 1950    Date: 04/15/2019    Primary Care Provider: Michele Burris MD    Reason for Consultation: Follow-up colonoscopy    Chief Complaint   Patient presents with   • Follow-up     Follow up EGD and colonoscopy        History of present illness:  I saw the patient in the office today as a followup from their recent colonoscopy with polypectomy, the pathology report did show invasive moderately differentiated adenocarcinoma and a tubular adenoma.  They also had an EGD and the pathology did show moderate chronic Helicobacter pylori gastritis. They state that they have done well and are having no complaints.    The following portions of the patient's history were reviewed and updated as appropriate: allergies, current medications, past family history, past medical history, past social history, past surgical history and problem list.      Review of Systems   Constitutional: Negative for chills, fever and unexpected weight change.   HENT: Negative for trouble swallowing and voice change.    Eyes: Negative for visual disturbance.   Respiratory: Negative for apnea, cough, chest tightness, shortness of breath and wheezing.    Cardiovascular: Negative for chest pain, palpitations and leg swelling.   Gastrointestinal: Negative for abdominal distention, abdominal pain, anal bleeding, blood in stool, constipation, diarrhea, nausea, rectal pain and vomiting.   Endocrine: Negative for cold intolerance and heat intolerance.   Genitourinary: Negative for difficulty urinating, dysuria, flank pain, scrotal swelling and testicular pain.   Musculoskeletal: Negative for back pain, gait problem and joint swelling.   Skin: Negative for color change, rash and wound.   Neurological: Negative for dizziness, syncope, speech difficulty, weakness, numbness and headaches.   Hematological: Negative for adenopathy. Does not bruise/bleed easily.   Psychiatric/Behavioral: Negative for confusion. The  "patient is not nervous/anxious.        Allergies:  Allergies   Allergen Reactions   • Losartan Rash       Medications:    Current Outpatient Medications:   •  aspirin 81 MG EC tablet, Take 1 tablet by mouth Daily. Hold aspirin until Monday, Disp: , Rfl:   •  bisoprolol (ZEBETA) 5 MG tablet, Take 1 tablet by mouth daily., Disp: 90 tablet, Rfl: 3  •  fluticasone (FLONASE) 50 MCG/ACT nasal spray, 1-2 sprays into the nostril(s) as directed by provider Daily., Disp: , Rfl:   •  lisinopril (PRINIVIL,ZESTRIL) 10 MG tablet, TAKE ONE TABLET BY MOUTH ONCE DAILY (Patient taking differently: TAKE HALF A TABLET OF 10 DAILY, FOR A DOSE OF 5MG DAILY), Disp: 90 tablet, Rfl: 0  •  pantoprazole (PROTONIX) 40 MG EC tablet, Take 1 tablet by mouth Daily., Disp: 30 tablet, Rfl: 0  •  pravastatin (PRAVACHOL) 80 MG tablet, TAKE ONE TABLET BY MOUTH ONCE DAILY EVERY  NIGHT, Disp: 90 tablet, Rfl: 0    Vital Signs:  Vitals:    04/15/19 1310   BP: 112/68   Pulse: 67   Temp: 97 °F (36.1 °C)   TempSrc: Temporal   SpO2: 97%   Weight: 97.5 kg (215 lb)   Height: 177.8 cm (70\")       Physical Exam:   General Appearance:    Alert, cooperative, in no acute distress   Abdomen:     no masses, no organomegaly, soft non-tender, non-distended, no guarding, wounds are well healed, no evidence of recurrent hernia   Chest:      Clear to ausculation            Cor:     Regular rate and rhythm    Results Review:   I reviewed the patient's new clinical results.    Assessment / Plan:    1. Malignant neoplasm of ascending colon (CMS/HCC)    2. Gastroesophageal reflux disease with esophagitis    3. Gastritis due to Helicobacter heilmannii      Patient prescribed Prevpac for 14 days to combat H. pylori gastritis and duodenal ulcer.  No further bleeding or dark stools.  Patient also scheduled for laparoscopic hand-assisted right colectomy for cecal cancer.  Risk of bleeding infection, anastomotic leak discussed and patient agreeable.    Electronically signed by Kenneth " DAX Hinojosa MD  04/15/19

## 2019-04-17 ENCOUNTER — APPOINTMENT (OUTPATIENT)
Dept: PREADMISSION TESTING | Facility: HOSPITAL | Age: 69
End: 2019-04-17

## 2019-04-17 VITALS — BODY MASS INDEX: 30.78 KG/M2 | WEIGHT: 215 LBS | HEIGHT: 70 IN

## 2019-04-25 ENCOUNTER — TELEPHONE (OUTPATIENT)
Dept: SURGERY | Facility: CLINIC | Age: 69
End: 2019-04-25

## 2019-04-29 ENCOUNTER — HOSPITAL ENCOUNTER (INPATIENT)
Facility: HOSPITAL | Age: 69
LOS: 8 days | Discharge: HOME | End: 2019-05-07
Attending: SURGERY | Admitting: SURGERY

## 2019-04-29 ENCOUNTER — ANESTHESIA EVENT (OUTPATIENT)
Dept: PERIOP | Facility: HOSPITAL | Age: 69
End: 2019-04-29

## 2019-04-29 ENCOUNTER — ANESTHESIA (OUTPATIENT)
Dept: PERIOP | Facility: HOSPITAL | Age: 69
End: 2019-04-29

## 2019-04-29 DIAGNOSIS — Z74.09 IMPAIRED MOBILITY AND ADLS: Primary | ICD-10-CM

## 2019-04-29 DIAGNOSIS — C18.2 MALIGNANT NEOPLASM OF ASCENDING COLON (HCC): ICD-10-CM

## 2019-04-29 DIAGNOSIS — Z78.9 IMPAIRED MOBILITY AND ADLS: Primary | ICD-10-CM

## 2019-04-29 LAB
ABO GROUP BLD: NORMAL
BASOPHILS # BLD AUTO: 0.05 10*3/MM3 (ref 0–0.2)
BASOPHILS NFR BLD AUTO: 0.4 % (ref 0–1.5)
BLD GP AB SCN SERPL QL: NEGATIVE
DEPRECATED RDW RBC AUTO: 54.8 FL (ref 37–54)
EOSINOPHIL # BLD AUTO: 0.19 10*3/MM3 (ref 0–0.4)
EOSINOPHIL NFR BLD AUTO: 1.7 % (ref 0.3–6.2)
ERYTHROCYTE [DISTWIDTH] IN BLOOD BY AUTOMATED COUNT: 21.9 % (ref 12.3–15.4)
GLUCOSE BLDC GLUCOMTR-MCNC: 131 MG/DL (ref 70–130)
HCT VFR BLD AUTO: 31 % (ref 37.5–51)
HGB BLD-MCNC: 8.8 G/DL (ref 13–17.7)
HYPOCHROMIA BLD QL: NORMAL
IMM GRANULOCYTES # BLD AUTO: 0.07 10*3/MM3 (ref 0–0.05)
IMM GRANULOCYTES NFR BLD AUTO: 0.6 % (ref 0–0.5)
LYMPHOCYTES # BLD AUTO: 1.27 10*3/MM3 (ref 0.7–3.1)
LYMPHOCYTES NFR BLD AUTO: 11.2 % (ref 19.6–45.3)
MCH RBC QN AUTO: 19.8 PG (ref 26.6–33)
MCHC RBC AUTO-ENTMCNC: 28.4 G/DL (ref 31.5–35.7)
MCV RBC AUTO: 69.7 FL (ref 79–97)
MICROCYTES BLD QL: NORMAL
MONOCYTES # BLD AUTO: 0.73 10*3/MM3 (ref 0.1–0.9)
MONOCYTES NFR BLD AUTO: 6.4 % (ref 5–12)
NEUTROPHILS # BLD AUTO: 9.02 10*3/MM3 (ref 1.7–7)
NEUTROPHILS NFR BLD AUTO: 79.7 % (ref 42.7–76)
NRBC BLD AUTO-RTO: 0 /100 WBC (ref 0–0.2)
OVALOCYTES BLD QL SMEAR: NORMAL
PLATELET # BLD AUTO: 377 10*3/MM3 (ref 140–450)
PMV BLD AUTO: 8.9 FL (ref 6–12)
POIKILOCYTOSIS BLD QL SMEAR: NORMAL
RBC # BLD AUTO: 4.45 10*6/MM3 (ref 4.14–5.8)
RH BLD: POSITIVE
SMALL PLATELETS BLD QL SMEAR: ADEQUATE
T&S EXPIRATION DATE: NORMAL
WBC MORPH BLD: NORMAL
WBC NRBC COR # BLD: 11.33 10*3/MM3 (ref 3.4–10.8)

## 2019-04-29 PROCEDURE — 25010000002 KETOROLAC TROMETHAMINE PER 15 MG: Performed by: NURSE ANESTHETIST, CERTIFIED REGISTERED

## 2019-04-29 PROCEDURE — 25010000002 PROPOFOL 1000 MG/ML EMULSION: Performed by: NURSE ANESTHETIST, CERTIFIED REGISTERED

## 2019-04-29 PROCEDURE — 85025 COMPLETE CBC W/AUTO DIFF WBC: CPT | Performed by: NURSE ANESTHETIST, CERTIFIED REGISTERED

## 2019-04-29 PROCEDURE — 99223 1ST HOSP IP/OBS HIGH 75: CPT | Performed by: HOSPITALIST

## 2019-04-29 PROCEDURE — 25010000003 AMPICILLIN-SULBACTAM PER 1.5 G: Performed by: SURGERY

## 2019-04-29 PROCEDURE — 82962 GLUCOSE BLOOD TEST: CPT

## 2019-04-29 PROCEDURE — 44204 LAPARO PARTIAL COLECTOMY: CPT | Performed by: SURGERY

## 2019-04-29 PROCEDURE — 86900 BLOOD TYPING SEROLOGIC ABO: CPT | Performed by: NURSE ANESTHETIST, CERTIFIED REGISTERED

## 2019-04-29 PROCEDURE — 86901 BLOOD TYPING SEROLOGIC RH(D): CPT | Performed by: NURSE ANESTHETIST, CERTIFIED REGISTERED

## 2019-04-29 PROCEDURE — 0DTF4ZZ RESECTION OF RIGHT LARGE INTESTINE, PERCUTANEOUS ENDOSCOPIC APPROACH: ICD-10-PCS | Performed by: SURGERY

## 2019-04-29 PROCEDURE — 25010000002 DEXAMETHASONE PER 1 MG: Performed by: NURSE ANESTHETIST, CERTIFIED REGISTERED

## 2019-04-29 PROCEDURE — 25010000002 ONDANSETRON PER 1 MG: Performed by: NURSE ANESTHETIST, CERTIFIED REGISTERED

## 2019-04-29 PROCEDURE — 86920 COMPATIBILITY TEST SPIN: CPT

## 2019-04-29 PROCEDURE — 85007 BL SMEAR W/DIFF WBC COUNT: CPT | Performed by: NURSE ANESTHETIST, CERTIFIED REGISTERED

## 2019-04-29 PROCEDURE — 25010000002 MAGNESIUM SULFATE PER 500 MG OF MAGNESIUM: Performed by: NURSE ANESTHETIST, CERTIFIED REGISTERED

## 2019-04-29 PROCEDURE — 25010000002 HYDROMORPHONE PER 4 MG: Performed by: NURSE ANESTHETIST, CERTIFIED REGISTERED

## 2019-04-29 PROCEDURE — 86850 RBC ANTIBODY SCREEN: CPT | Performed by: NURSE ANESTHETIST, CERTIFIED REGISTERED

## 2019-04-29 PROCEDURE — 25010000002 PROPOFOL 200 MG/20ML EMULSION: Performed by: NURSE ANESTHETIST, CERTIFIED REGISTERED

## 2019-04-29 DEVICE — PROXIMATE LINEAR CUTTER RELOAD, BLUE, 75MM
Type: IMPLANTABLE DEVICE | Site: ASCENDING COLON | Status: FUNCTIONAL
Brand: PROXIMATE

## 2019-04-29 RX ORDER — ONDANSETRON 2 MG/ML
4 INJECTION INTRAMUSCULAR; INTRAVENOUS EVERY 6 HOURS PRN
Status: DISCONTINUED | OUTPATIENT
Start: 2019-04-29 | End: 2019-05-07 | Stop reason: HOSPADM

## 2019-04-29 RX ORDER — PROMETHAZINE HYDROCHLORIDE 25 MG/ML
6.25 INJECTION, SOLUTION INTRAMUSCULAR; INTRAVENOUS ONCE AS NEEDED
Status: DISCONTINUED | OUTPATIENT
Start: 2019-04-29 | End: 2019-04-29 | Stop reason: HOSPADM

## 2019-04-29 RX ORDER — SODIUM CHLORIDE 0.9 % (FLUSH) 0.9 %
3 SYRINGE (ML) INJECTION AS NEEDED
Status: DISCONTINUED | OUTPATIENT
Start: 2019-04-29 | End: 2019-04-29 | Stop reason: HOSPADM

## 2019-04-29 RX ORDER — NALOXONE HCL 0.4 MG/ML
0.1 VIAL (ML) INJECTION
Status: DISCONTINUED | OUTPATIENT
Start: 2019-04-29 | End: 2019-05-07 | Stop reason: HOSPADM

## 2019-04-29 RX ORDER — LIDOCAINE HYDROCHLORIDE 20 MG/ML
INJECTION, SOLUTION INFILTRATION; PERINEURAL AS NEEDED
Status: DISCONTINUED | OUTPATIENT
Start: 2019-04-29 | End: 2019-04-29 | Stop reason: SURG

## 2019-04-29 RX ORDER — LISINOPRIL 5 MG/1
5 TABLET ORAL DAILY
Status: DISCONTINUED | OUTPATIENT
Start: 2019-04-30 | End: 2019-05-07 | Stop reason: HOSPADM

## 2019-04-29 RX ORDER — LISINOPRIL 10 MG/1
10 TABLET ORAL DAILY
Status: DISCONTINUED | OUTPATIENT
Start: 2019-04-29 | End: 2019-04-29

## 2019-04-29 RX ORDER — PROMETHAZINE HYDROCHLORIDE 25 MG/1
25 SUPPOSITORY RECTAL ONCE AS NEEDED
Status: DISCONTINUED | OUTPATIENT
Start: 2019-04-29 | End: 2019-04-29 | Stop reason: HOSPADM

## 2019-04-29 RX ORDER — PROMETHAZINE HYDROCHLORIDE 25 MG/ML
12.5 INJECTION, SOLUTION INTRAMUSCULAR; INTRAVENOUS EVERY 6 HOURS PRN
Status: DISCONTINUED | OUTPATIENT
Start: 2019-04-29 | End: 2019-05-07 | Stop reason: HOSPADM

## 2019-04-29 RX ORDER — BISOPROLOL FUMARATE 5 MG/1
5 TABLET, FILM COATED ORAL
Status: DISCONTINUED | OUTPATIENT
Start: 2019-04-29 | End: 2019-05-03

## 2019-04-29 RX ORDER — DEXAMETHASONE SODIUM PHOSPHATE 4 MG/ML
INJECTION, SOLUTION INTRA-ARTICULAR; INTRALESIONAL; INTRAMUSCULAR; INTRAVENOUS; SOFT TISSUE AS NEEDED
Status: DISCONTINUED | OUTPATIENT
Start: 2019-04-29 | End: 2019-04-29 | Stop reason: SURG

## 2019-04-29 RX ORDER — PROPOFOL 10 MG/ML
INJECTION, EMULSION INTRAVENOUS AS NEEDED
Status: DISCONTINUED | OUTPATIENT
Start: 2019-04-29 | End: 2019-04-29 | Stop reason: SURG

## 2019-04-29 RX ORDER — METRONIDAZOLE 500 MG/1
500 TABLET ORAL 2 TIMES DAILY
Status: COMPLETED | OUTPATIENT
Start: 2019-04-29 | End: 2019-04-29

## 2019-04-29 RX ORDER — ROCURONIUM BROMIDE 10 MG/ML
INJECTION, SOLUTION INTRAVENOUS AS NEEDED
Status: DISCONTINUED | OUTPATIENT
Start: 2019-04-29 | End: 2019-04-29 | Stop reason: SURG

## 2019-04-29 RX ORDER — SODIUM CHLORIDE 0.9 % (FLUSH) 0.9 %
3-10 SYRINGE (ML) INJECTION AS NEEDED
Status: DISCONTINUED | OUTPATIENT
Start: 2019-04-29 | End: 2019-05-07 | Stop reason: HOSPADM

## 2019-04-29 RX ORDER — SODIUM CHLORIDE 0.9 % (FLUSH) 0.9 %
3 SYRINGE (ML) INJECTION EVERY 12 HOURS SCHEDULED
Status: DISCONTINUED | OUTPATIENT
Start: 2019-04-29 | End: 2019-05-07 | Stop reason: HOSPADM

## 2019-04-29 RX ORDER — BUPIVACAINE HYDROCHLORIDE 5 MG/ML
INJECTION, SOLUTION EPIDURAL; INTRACAUDAL AS NEEDED
Status: DISCONTINUED | OUTPATIENT
Start: 2019-04-29 | End: 2019-04-29 | Stop reason: HOSPADM

## 2019-04-29 RX ORDER — AMOXICILLIN 500 MG/1
1000 CAPSULE ORAL 2 TIMES DAILY
Status: COMPLETED | OUTPATIENT
Start: 2019-04-29 | End: 2019-04-29

## 2019-04-29 RX ORDER — SODIUM CHLORIDE 9 MG/ML
INJECTION, SOLUTION INTRAVENOUS AS NEEDED
Status: DISCONTINUED | OUTPATIENT
Start: 2019-04-29 | End: 2019-04-29 | Stop reason: HOSPADM

## 2019-04-29 RX ORDER — HYDROCODONE BITARTRATE AND ACETAMINOPHEN 7.5; 325 MG/1; MG/1
1 TABLET ORAL EVERY 4 HOURS PRN
Status: DISCONTINUED | OUTPATIENT
Start: 2019-04-29 | End: 2019-05-07 | Stop reason: HOSPADM

## 2019-04-29 RX ORDER — KETAMINE HYDROCHLORIDE 50 MG/ML
INJECTION, SOLUTION, CONCENTRATE INTRAMUSCULAR; INTRAVENOUS AS NEEDED
Status: DISCONTINUED | OUTPATIENT
Start: 2019-04-29 | End: 2019-04-29 | Stop reason: SURG

## 2019-04-29 RX ORDER — BUPIVACAINE HCL/0.9 % NACL/PF 0.125 %
PLASTIC BAG, INJECTION (ML) EPIDURAL AS NEEDED
Status: DISCONTINUED | OUTPATIENT
Start: 2019-04-29 | End: 2019-04-29 | Stop reason: SURG

## 2019-04-29 RX ORDER — CLARITHROMYCIN 500 MG/1
500 TABLET, COATED ORAL 2 TIMES DAILY
Status: COMPLETED | OUTPATIENT
Start: 2019-04-29 | End: 2019-04-29

## 2019-04-29 RX ORDER — DOCUSATE SODIUM 100 MG/1
100 CAPSULE, LIQUID FILLED ORAL 2 TIMES DAILY
Status: DISCONTINUED | OUTPATIENT
Start: 2019-04-29 | End: 2019-05-04

## 2019-04-29 RX ORDER — MAGNESIUM HYDROXIDE 1200 MG/15ML
LIQUID ORAL AS NEEDED
Status: DISCONTINUED | OUTPATIENT
Start: 2019-04-29 | End: 2019-04-29 | Stop reason: HOSPADM

## 2019-04-29 RX ORDER — PROMETHAZINE HYDROCHLORIDE 25 MG/1
25 TABLET ORAL ONCE AS NEEDED
Status: DISCONTINUED | OUTPATIENT
Start: 2019-04-29 | End: 2019-04-29 | Stop reason: HOSPADM

## 2019-04-29 RX ORDER — SODIUM CHLORIDE, SODIUM LACTATE, POTASSIUM CHLORIDE, CALCIUM CHLORIDE 600; 310; 30; 20 MG/100ML; MG/100ML; MG/100ML; MG/100ML
1000 INJECTION, SOLUTION INTRAVENOUS CONTINUOUS
Status: ACTIVE | OUTPATIENT
Start: 2019-04-29 | End: 2019-04-30

## 2019-04-29 RX ORDER — PANTOPRAZOLE SODIUM 40 MG/10ML
INJECTION, POWDER, LYOPHILIZED, FOR SOLUTION INTRAVENOUS
Status: COMPLETED
Start: 2019-04-29 | End: 2019-04-29

## 2019-04-29 RX ORDER — HYDROMORPHONE HCL 110MG/55ML
PATIENT CONTROLLED ANALGESIA SYRINGE INTRAVENOUS AS NEEDED
Status: DISCONTINUED | OUTPATIENT
Start: 2019-04-29 | End: 2019-04-29 | Stop reason: SURG

## 2019-04-29 RX ORDER — PANTOPRAZOLE SODIUM 40 MG/10ML
40 INJECTION, POWDER, LYOPHILIZED, FOR SOLUTION INTRAVENOUS
Status: DISCONTINUED | OUTPATIENT
Start: 2019-04-29 | End: 2019-05-07 | Stop reason: HOSPADM

## 2019-04-29 RX ORDER — MAGNESIUM SULFATE HEPTAHYDRATE 500 MG/ML
INJECTION, SOLUTION INTRAMUSCULAR; INTRAVENOUS AS NEEDED
Status: DISCONTINUED | OUTPATIENT
Start: 2019-04-29 | End: 2019-04-29 | Stop reason: SURG

## 2019-04-29 RX ORDER — LIDOCAINE HYDROCHLORIDE 5 MG/ML
INJECTION, SOLUTION INFILTRATION; INTRAVENOUS
Status: DISCONTINUED | OUTPATIENT
Start: 2019-04-29 | End: 2019-04-29 | Stop reason: SURG

## 2019-04-29 RX ORDER — ONDANSETRON 4 MG/1
4 TABLET, FILM COATED ORAL EVERY 6 HOURS PRN
Status: DISCONTINUED | OUTPATIENT
Start: 2019-04-29 | End: 2019-05-07 | Stop reason: HOSPADM

## 2019-04-29 RX ORDER — KETOROLAC TROMETHAMINE 30 MG/ML
INJECTION, SOLUTION INTRAMUSCULAR; INTRAVENOUS AS NEEDED
Status: DISCONTINUED | OUTPATIENT
Start: 2019-04-29 | End: 2019-04-29 | Stop reason: SURG

## 2019-04-29 RX ORDER — ONDANSETRON 2 MG/ML
INJECTION INTRAMUSCULAR; INTRAVENOUS AS NEEDED
Status: DISCONTINUED | OUTPATIENT
Start: 2019-04-29 | End: 2019-04-29 | Stop reason: SURG

## 2019-04-29 RX ORDER — DEXTROSE AND SODIUM CHLORIDE 5; .45 G/100ML; G/100ML
100 INJECTION, SOLUTION INTRAVENOUS CONTINUOUS
Status: DISCONTINUED | OUTPATIENT
Start: 2019-04-29 | End: 2019-04-30

## 2019-04-29 RX ORDER — ONDANSETRON 2 MG/ML
4 INJECTION INTRAMUSCULAR; INTRAVENOUS ONCE AS NEEDED
Status: DISCONTINUED | OUTPATIENT
Start: 2019-04-29 | End: 2019-04-29 | Stop reason: HOSPADM

## 2019-04-29 RX ORDER — LABETALOL HYDROCHLORIDE 5 MG/ML
5 INJECTION, SOLUTION INTRAVENOUS
Status: DISCONTINUED | OUTPATIENT
Start: 2019-04-29 | End: 2019-04-29 | Stop reason: HOSPADM

## 2019-04-29 RX ADMIN — METRONIDAZOLE 500 MG: 500 TABLET ORAL at 12:34

## 2019-04-29 RX ADMIN — AMOXICILLIN 1000 MG: 500 CAPSULE ORAL at 20:39

## 2019-04-29 RX ADMIN — LIDOCAINE HYDROCHLORIDE 20 ML: 5 INJECTION, SOLUTION INFILTRATION at 09:00

## 2019-04-29 RX ADMIN — BISOPROLOL FUMARATE 5 MG: 5 TABLET ORAL at 12:34

## 2019-04-29 RX ADMIN — SODIUM CHLORIDE, POTASSIUM CHLORIDE, SODIUM LACTATE AND CALCIUM CHLORIDE 1000 ML: 600; 310; 30; 20 INJECTION, SOLUTION INTRAVENOUS at 06:52

## 2019-04-29 RX ADMIN — HYDROCODONE BITARTRATE AND ACETAMINOPHEN 1 TABLET: 7.5; 325 TABLET ORAL at 10:34

## 2019-04-29 RX ADMIN — CLARITHROMYCIN 500 MG: 500 TABLET ORAL at 20:39

## 2019-04-29 RX ADMIN — LISINOPRIL 10 MG: 10 TABLET ORAL at 10:34

## 2019-04-29 RX ADMIN — KETOROLAC TROMETHAMINE 15 MG: 30 INJECTION, SOLUTION INTRAMUSCULAR at 08:29

## 2019-04-29 RX ADMIN — ROCURONIUM BROMIDE 3 MG: 10 INJECTION INTRAVENOUS at 08:31

## 2019-04-29 RX ADMIN — PANTOPRAZOLE SODIUM 40 MG: 40 INJECTION, POWDER, FOR SOLUTION INTRAVENOUS at 09:16

## 2019-04-29 RX ADMIN — METRONIDAZOLE 500 MG: 500 TABLET ORAL at 20:39

## 2019-04-29 RX ADMIN — DOCUSATE SODIUM 100 MG: 100 CAPSULE, LIQUID FILLED ORAL at 12:34

## 2019-04-29 RX ADMIN — KETAMINE HYDROCHLORIDE 25 MG: 50 INJECTION, SOLUTION INTRAMUSCULAR; INTRAVENOUS at 07:35

## 2019-04-29 RX ADMIN — AMOXICILLIN 1000 MG: 500 CAPSULE ORAL at 12:34

## 2019-04-29 RX ADMIN — HYDROCODONE BITARTRATE AND ACETAMINOPHEN 1 TABLET: 7.5; 325 TABLET ORAL at 23:49

## 2019-04-29 RX ADMIN — Medication 100 MCG: at 07:33

## 2019-04-29 RX ADMIN — MAGNESIUM SULFATE HEPTAHYDRATE 1 G: 500 INJECTION, SOLUTION INTRAMUSCULAR; INTRAVENOUS at 07:39

## 2019-04-29 RX ADMIN — PROPOFOL 100 MG: 10 INJECTION, EMULSION INTRAVENOUS at 07:26

## 2019-04-29 RX ADMIN — ONDANSETRON 4 MG: 2 INJECTION INTRAMUSCULAR; INTRAVENOUS at 07:31

## 2019-04-29 RX ADMIN — ROCURONIUM BROMIDE 40 MG: 10 INJECTION INTRAVENOUS at 07:26

## 2019-04-29 RX ADMIN — CLARITHROMYCIN 500 MG: 500 TABLET ORAL at 12:35

## 2019-04-29 RX ADMIN — PROPOFOL 75 MCG/KG/MIN: 10 INJECTION, EMULSION INTRAVENOUS at 07:22

## 2019-04-29 RX ADMIN — LIDOCAINE HYDROCHLORIDE 10 ML: 20 INJECTION, SOLUTION INFILTRATION; PERINEURAL at 07:22

## 2019-04-29 RX ADMIN — DEXAMETHASONE SODIUM PHOSPHATE 8 MG: 4 INJECTION, SOLUTION INTRAMUSCULAR; INTRAVENOUS at 07:23

## 2019-04-29 RX ADMIN — SODIUM CHLORIDE 3 G: 9 INJECTION, SOLUTION INTRAVENOUS at 07:25

## 2019-04-29 RX ADMIN — HYDROMORPHONE HYDROCHLORIDE 0.2 MG: 2 INJECTION, SOLUTION INTRAMUSCULAR; INTRAVENOUS; SUBCUTANEOUS at 08:19

## 2019-04-29 RX ADMIN — DEXTROSE AND SODIUM CHLORIDE 100 ML/HR: 5; 450 INJECTION, SOLUTION INTRAVENOUS at 10:20

## 2019-04-29 RX ADMIN — SUGAMMADEX 195 MG: 100 INJECTION, SOLUTION INTRAVENOUS at 08:38

## 2019-04-29 RX ADMIN — SODIUM CHLORIDE, POTASSIUM CHLORIDE, SODIUM LACTATE AND CALCIUM CHLORIDE: 600; 310; 30; 20 INJECTION, SOLUTION INTRAVENOUS at 08:39

## 2019-04-29 RX ADMIN — PROPOFOL 50 MG: 10 INJECTION, EMULSION INTRAVENOUS at 08:37

## 2019-04-29 RX ADMIN — DEXTROSE AND SODIUM CHLORIDE 100 ML/HR: 5; 450 INJECTION, SOLUTION INTRAVENOUS at 20:38

## 2019-04-29 RX ADMIN — DOCUSATE SODIUM 100 MG: 100 CAPSULE, LIQUID FILLED ORAL at 20:39

## 2019-04-29 RX ADMIN — SODIUM CHLORIDE, PRESERVATIVE FREE 3 ML: 5 INJECTION INTRAVENOUS at 20:41

## 2019-04-29 RX ADMIN — Medication 200 MCG: at 07:42

## 2019-04-29 RX ADMIN — KETOROLAC TROMETHAMINE 15 MG: 30 INJECTION, SOLUTION INTRAMUSCULAR at 07:32

## 2019-04-29 RX ADMIN — Medication 100 MCG: at 07:39

## 2019-04-29 NOTE — ANESTHESIA PROCEDURE NOTES
Airway  Urgency: elective    Airway not difficult    General Information and Staff    Patient location during procedure: OR  CRNA: Jj Jolley CRNA    Indications and Patient Condition  Indications for airway management: airway protection    Preoxygenated: yes  Mask difficulty assessment: 1 - vent by mask    Final Airway Details  Final airway type: endotracheal airway      Successful airway: ETT  Cuffed: yes   Successful intubation technique: direct laryngoscopy  Endotracheal tube insertion site: oral  Blade: Trevor  Blade size: 3  ETT size (mm): 7.0  Cormack-Lehane Classification: grade IIa - partial view of glottis  Placement verified by: chest auscultation and capnometry   Measured from: lips  ETT to lips (cm): 19  Number of attempts at approach: 1

## 2019-04-29 NOTE — ANESTHESIA PREPROCEDURE EVALUATION
Anesthesia Evaluation     Patient summary reviewed and Nursing notes reviewed   NPO Solid Status: > 8 hours  NPO Liquid Status: > 8 hours           Airway   Mallampati: II  TM distance: >3 FB  Neck ROM: full  No difficulty expected  Dental - normal exam   (+) poor dentition    Pulmonary - negative pulmonary ROS and normal exam   Cardiovascular - normal exam  Exercise tolerance: good (4-7 METS)    ECG reviewed  Patient on routine beta blocker and Beta blocker given within 24 hours of surgery    (+) hypertension well controlled 2 medications or greater, past MI  >12 months, CAD, cardiac stents Drug eluting stent more than 12 months ago hyperlipidemia,       Neuro/Psych- negative ROS  GI/Hepatic/Renal/Endo    (+)  GERD, PUD, GI bleeding,     Musculoskeletal     Abdominal  - normal exam    Bowel sounds: normal.   Substance History - negative use     OB/GYN negative ob/gyn ROS   (-)  Pregnant        Other   (+) arthritis   history of cancer    ROS/Med Hx Other: +anemia                Anesthesia Plan    ASA 3     general with block     intravenous induction   Anesthetic plan, all risks, benefits, and alternatives have been provided, discussed and informed consent has been obtained with: patient.    Plan discussed with attending.

## 2019-04-29 NOTE — ANESTHESIA POSTPROCEDURE EVALUATION
Patient: Nilay Odonnell    Procedure Summary     Date:  04/29/19 Room / Location:  [facility] OR 4 /  [facility]    Anesthesia Start:  0719 Anesthesia Stop:  0907    Procedure:  COLECTOMY LAPAROSCOPIC HAND ASSISTED (Right Abdomen) Diagnosis:       Malignant neoplasm of ascending colon (CMS/HCC)      (Malignant neoplasm of ascending colon (CMS/HCC) [C18.2])    Surgeon:  Kenneth Hinojosa MD Provider:  Jj Jolley CRNA    Anesthesia Type:  general with block ASA Status:  3          Anesthesia Type: general with block  Last vitals  BP   139/56 (04/29/19 1044)   Temp   97.7 °F (36.5 °C) (04/29/19 1044)   Pulse   79 (04/29/19 1044)   Resp   16 (04/29/19 1044)     SpO2   96 % (04/29/19 1044)     Post Anesthesia Care and Evaluation    Patient location during evaluation: PACU  Patient participation: complete - patient participated  Level of consciousness: awake and alert  Pain score: 1  Pain management: satisfactory to patient  Airway patency: patent  Anesthetic complications: No anesthetic complications  PONV Status: none  Cardiovascular status: acceptable and hemodynamically stable  Respiratory status: acceptable  Hydration status: acceptable

## 2019-04-30 LAB
ANION GAP SERPL CALCULATED.3IONS-SCNC: 14.1 MMOL/L (ref 10–20)
ANISOCYTOSIS BLD QL: NORMAL
BASOPHILS # BLD AUTO: 0.02 10*3/MM3 (ref 0–0.2)
BASOPHILS NFR BLD AUTO: 0.1 % (ref 0–1.5)
BUN BLD-MCNC: 10 MG/DL (ref 7–20)
BUN/CREAT SERPL: 9.1 (ref 6.3–21.9)
CALCIUM SPEC-SCNC: 8.4 MG/DL (ref 8.4–10.2)
CHLORIDE SERPL-SCNC: 106 MMOL/L (ref 98–107)
CO2 SERPL-SCNC: 23 MMOL/L (ref 26–30)
CREAT BLD-MCNC: 1.1 MG/DL (ref 0.6–1.3)
DEPRECATED RDW RBC AUTO: 54.1 FL (ref 37–54)
EOSINOPHIL # BLD AUTO: 0 10*3/MM3 (ref 0–0.4)
EOSINOPHIL NFR BLD AUTO: 0 % (ref 0.3–6.2)
ERYTHROCYTE [DISTWIDTH] IN BLOOD BY AUTOMATED COUNT: 21.8 % (ref 12.3–15.4)
GFR SERPL CREATININE-BSD FRML MDRD: 66 ML/MIN/1.73
GLUCOSE BLD-MCNC: 164 MG/DL (ref 74–98)
HCT VFR BLD AUTO: 26.3 % (ref 37.5–51)
HGB BLD-MCNC: 7.4 G/DL (ref 13–17.7)
IMM GRANULOCYTES # BLD AUTO: 0.13 10*3/MM3 (ref 0–0.05)
IMM GRANULOCYTES NFR BLD AUTO: 0.6 % (ref 0–0.5)
LYMPHOCYTES # BLD AUTO: 0.86 10*3/MM3 (ref 0.7–3.1)
LYMPHOCYTES NFR BLD AUTO: 4.1 % (ref 19.6–45.3)
MCH RBC QN AUTO: 19.3 PG (ref 26.6–33)
MCHC RBC AUTO-ENTMCNC: 28.1 G/DL (ref 31.5–35.7)
MCV RBC AUTO: 68.7 FL (ref 79–97)
MICROCYTES BLD QL: NORMAL
MONOCYTES # BLD AUTO: 0.91 10*3/MM3 (ref 0.1–0.9)
MONOCYTES NFR BLD AUTO: 4.3 % (ref 5–12)
NEUTROPHILS # BLD AUTO: 19.07 10*3/MM3 (ref 1.7–7)
NEUTROPHILS NFR BLD AUTO: 90.9 % (ref 42.7–76)
NRBC BLD AUTO-RTO: 0 /100 WBC (ref 0–0.2)
PLATELET # BLD AUTO: 386 10*3/MM3 (ref 140–450)
PMV BLD AUTO: 9.8 FL (ref 6–12)
POIKILOCYTOSIS BLD QL SMEAR: NORMAL
POTASSIUM BLD-SCNC: 4.1 MMOL/L (ref 3.5–5.1)
RBC # BLD AUTO: 3.83 10*6/MM3 (ref 4.14–5.8)
SMALL PLATELETS BLD QL SMEAR: ADEQUATE
SODIUM BLD-SCNC: 139 MMOL/L (ref 137–145)
WBC MORPH BLD: NORMAL
WBC NRBC COR # BLD: 20.99 10*3/MM3 (ref 3.4–10.8)

## 2019-04-30 PROCEDURE — 97165 OT EVAL LOW COMPLEX 30 MIN: CPT

## 2019-04-30 PROCEDURE — 25010000002 ENOXAPARIN PER 10 MG: Performed by: SURGERY

## 2019-04-30 PROCEDURE — P9016 RBC LEUKOCYTES REDUCED: HCPCS

## 2019-04-30 PROCEDURE — 36430 TRANSFUSION BLD/BLD COMPNT: CPT

## 2019-04-30 PROCEDURE — 80048 BASIC METABOLIC PNL TOTAL CA: CPT | Performed by: SURGERY

## 2019-04-30 PROCEDURE — 99024 POSTOP FOLLOW-UP VISIT: CPT | Performed by: SURGERY

## 2019-04-30 PROCEDURE — 85025 COMPLETE CBC W/AUTO DIFF WBC: CPT | Performed by: SURGERY

## 2019-04-30 PROCEDURE — 99232 SBSQ HOSP IP/OBS MODERATE 35: CPT | Performed by: HOSPITALIST

## 2019-04-30 PROCEDURE — 85007 BL SMEAR W/DIFF WBC COUNT: CPT | Performed by: SURGERY

## 2019-04-30 PROCEDURE — 86900 BLOOD TYPING SEROLOGIC ABO: CPT

## 2019-04-30 RX ADMIN — DOCUSATE SODIUM 100 MG: 100 CAPSULE, LIQUID FILLED ORAL at 21:32

## 2019-04-30 RX ADMIN — SODIUM CHLORIDE, PRESERVATIVE FREE 3 ML: 5 INJECTION INTRAVENOUS at 21:33

## 2019-04-30 RX ADMIN — ENOXAPARIN SODIUM 40 MG: 40 INJECTION SUBCUTANEOUS at 08:43

## 2019-04-30 RX ADMIN — DEXTROSE AND SODIUM CHLORIDE 100 ML/HR: 5; 450 INJECTION, SOLUTION INTRAVENOUS at 06:34

## 2019-04-30 RX ADMIN — SODIUM CHLORIDE, PRESERVATIVE FREE 3 ML: 5 INJECTION INTRAVENOUS at 08:45

## 2019-04-30 RX ADMIN — DOCUSATE SODIUM 100 MG: 100 CAPSULE, LIQUID FILLED ORAL at 08:31

## 2019-04-30 RX ADMIN — LISINOPRIL 5 MG: 5 TABLET ORAL at 08:31

## 2019-04-30 RX ADMIN — PANTOPRAZOLE SODIUM 40 MG: 40 INJECTION, POWDER, FOR SOLUTION INTRAVENOUS at 05:55

## 2019-05-01 LAB
ABO + RH BLD: NORMAL
ANISOCYTOSIS BLD QL: NORMAL
BASOPHILS # BLD AUTO: 0.05 10*3/MM3 (ref 0–0.2)
BASOPHILS NFR BLD AUTO: 0.2 % (ref 0–1.5)
BH BB BLOOD EXPIRATION DATE: NORMAL
BH BB BLOOD TYPE BARCODE: 6200
BH BB DISPENSE STATUS: NORMAL
BH BB PRODUCT CODE: NORMAL
BH BB UNIT NUMBER: NORMAL
CROSSMATCH INTERPRETATION: NORMAL
DEPRECATED RDW RBC AUTO: 55.8 FL (ref 37–54)
ELLIPTOCYTES BLD QL SMEAR: NORMAL
EOSINOPHIL # BLD AUTO: 0 10*3/MM3 (ref 0–0.4)
EOSINOPHIL NFR BLD AUTO: 0 % (ref 0.3–6.2)
ERYTHROCYTE [DISTWIDTH] IN BLOOD BY AUTOMATED COUNT: 22.9 % (ref 12.3–15.4)
HCT VFR BLD AUTO: 35.8 % (ref 37.5–51)
HGB BLD-MCNC: 10.5 G/DL (ref 13–17.7)
HYPOCHROMIA BLD QL: NORMAL
IMM GRANULOCYTES # BLD AUTO: 0.18 10*3/MM3 (ref 0–0.05)
IMM GRANULOCYTES NFR BLD AUTO: 0.8 % (ref 0–0.5)
LYMPHOCYTES # BLD AUTO: 1.18 10*3/MM3 (ref 0.7–3.1)
LYMPHOCYTES NFR BLD AUTO: 5.3 % (ref 19.6–45.3)
MCH RBC QN AUTO: 20.6 PG (ref 26.6–33)
MCHC RBC AUTO-ENTMCNC: 29.3 G/DL (ref 31.5–35.7)
MCV RBC AUTO: 70.3 FL (ref 79–97)
MICROCYTES BLD QL: NORMAL
MONOCYTES # BLD AUTO: 1.85 10*3/MM3 (ref 0.1–0.9)
MONOCYTES NFR BLD AUTO: 8.2 % (ref 5–12)
NEUTROPHILS # BLD AUTO: 19.17 10*3/MM3 (ref 1.7–7)
NEUTROPHILS NFR BLD AUTO: 85.5 % (ref 42.7–76)
NRBC BLD AUTO-RTO: 0 /100 WBC (ref 0–0.2)
PLATELET # BLD AUTO: 477 10*3/MM3 (ref 140–450)
PMV BLD AUTO: 9.8 FL (ref 6–12)
POIKILOCYTOSIS BLD QL SMEAR: NORMAL
POLYCHROMASIA BLD QL SMEAR: NORMAL
RBC # BLD AUTO: 5.09 10*6/MM3 (ref 4.14–5.8)
SMALL PLATELETS BLD QL SMEAR: NORMAL
UNIT  ABO: NORMAL
UNIT  RH: NORMAL
WBC MORPH BLD: NORMAL
WBC NRBC COR # BLD: 22.43 10*3/MM3 (ref 3.4–10.8)

## 2019-05-01 PROCEDURE — 85025 COMPLETE CBC W/AUTO DIFF WBC: CPT | Performed by: HOSPITALIST

## 2019-05-01 PROCEDURE — 99024 POSTOP FOLLOW-UP VISIT: CPT | Performed by: SURGERY

## 2019-05-01 PROCEDURE — 25010000002 ENOXAPARIN PER 10 MG: Performed by: SURGERY

## 2019-05-01 PROCEDURE — 85007 BL SMEAR W/DIFF WBC COUNT: CPT | Performed by: HOSPITALIST

## 2019-05-01 PROCEDURE — 99231 SBSQ HOSP IP/OBS SF/LOW 25: CPT | Performed by: HOSPITALIST

## 2019-05-01 PROCEDURE — 25010000002 ONDANSETRON PER 1 MG: Performed by: SURGERY

## 2019-05-01 RX ORDER — SODIUM CHLORIDE 9 MG/ML
100 INJECTION, SOLUTION INTRAVENOUS CONTINUOUS
Status: DISCONTINUED | OUTPATIENT
Start: 2019-05-01 | End: 2019-05-03

## 2019-05-01 RX ADMIN — DOCUSATE SODIUM 100 MG: 100 CAPSULE, LIQUID FILLED ORAL at 20:17

## 2019-05-01 RX ADMIN — ENOXAPARIN SODIUM 40 MG: 40 INJECTION SUBCUTANEOUS at 08:44

## 2019-05-01 RX ADMIN — SODIUM CHLORIDE, PRESERVATIVE FREE 3 ML: 5 INJECTION INTRAVENOUS at 20:17

## 2019-05-01 RX ADMIN — SODIUM CHLORIDE, PRESERVATIVE FREE 3 ML: 5 INJECTION INTRAVENOUS at 08:44

## 2019-05-01 RX ADMIN — BISOPROLOL FUMARATE 5 MG: 5 TABLET ORAL at 08:44

## 2019-05-01 RX ADMIN — SODIUM CHLORIDE 100 ML/HR: 9 INJECTION, SOLUTION INTRAVENOUS at 11:04

## 2019-05-01 RX ADMIN — ONDANSETRON 4 MG: 2 INJECTION INTRAMUSCULAR; INTRAVENOUS at 17:27

## 2019-05-01 RX ADMIN — SODIUM CHLORIDE 100 ML/HR: 9 INJECTION, SOLUTION INTRAVENOUS at 19:51

## 2019-05-01 RX ADMIN — PANTOPRAZOLE SODIUM 40 MG: 40 INJECTION, POWDER, FOR SOLUTION INTRAVENOUS at 05:30

## 2019-05-01 RX ADMIN — LISINOPRIL 5 MG: 5 TABLET ORAL at 08:44

## 2019-05-01 RX ADMIN — DOCUSATE SODIUM 100 MG: 100 CAPSULE, LIQUID FILLED ORAL at 08:44

## 2019-05-01 NOTE — SIGNIFICANT NOTE
05/01/19 1331   Rehab Treatment   Discipline occupational therapist   Reason Treatment Not Performed patient/family declined treatment

## 2019-05-01 NOTE — PROGRESS NOTES
[facility]    Name: iNlay Odonnell, 69 y.o. male  MRN: [id number], : 1950   Date of Admission: 2019   Date of Service: 19   PCP: Michele Burris MD     Hospital Course:   Nilay Odonnell is a(n) 69 y.o. year old male with a history of arthritis, GERD with recent H. pylori, CAD s/p MI, HTN, HLD, who was admitted on 2019 after laparoscopic right hemicolectomy for right colon carcinoma as noted to have localized severely nodular and ulcerated mucosa noted in ascending colon on colonoscopy done earlier this month.     He also had a EGD at that time and biopsies were positive for H. pylori, he was started on treatment for this.  Patient states he picked up medications on 4/15/19 and started them that evening.  Since last hospitalization he denies fevers, chills, chest pain or pressure, abdominal pain, nausea, vomiting, melena, hematochezia, hematuria, headache, dysphagia or odynophagia.  He does endorse occasional dizziness, however not to the extent as prior to last admission.     Post-operatively hemoglobin decreased to 7.4, required 1 unit PRBC transfusion with appropriate response.  PT and OT was consulted to encourage patient to ambulate.     Antibiotics:   unasyn fly-op  Completed therapy for H. pylori   Micro: none  -------------------------------------------------------------------------------------------------------------------  Subjective   Chief Complaint: f/u HTN and anemia     Subjective:   Patient seen and examined this morning.  Events from last night noted - vomited bilious contents. Discussed with MANDO Cerrato. +flatus, having small bowel movements. Tolerating clears this morning without emesis.    Review of Systems:   Gen-no fevers, no chills  CV-no chest pain, no palpitations  Resp-no cough, no dyspnea  GI-no N/D    Objective   Objective:     Intake/Output Summary (Last 24 hours) at 2019 1601  Last data filed at 2019  1257  Gross per 24 hour   Intake 360 ml   Output 200 ml   Net 160 ml      SpO2: 92 %     Physical Examination:   Vitals:    04/30/19 2300 05/01/19 0300 05/01/19 0743 05/01/19 1156   BP: 111/66 104/70 113/65 116/60   BP Location: Right arm Right arm Right arm Right arm   Patient Position: Lying Lying Lying Lying   Pulse: 88 94 76 82   Resp: 12 12 14 14   Temp: 98.6 °F (37 °C) 98.4 °F (36.9 °C) 98.3 °F (36.8 °C) 98.2 °F (36.8 °C)   TempSrc: Oral Oral Oral Oral   SpO2: 92% 92% 91% 92%   Weight:       Height:          General:  WDWN elderly male; no acute distress; sitting in chair  Heart:   RRR, S1 S2 normal, no m/r/g  Lungs:   CTAB, no wheezes  Abdomen:  Obese, soft, NT, ND, +BS  Extremities: no edema/cyanosis/clubbing; SCDs in place  Neuro:  A&Ox3, no focal deficits  Psych:  appropriate mood  Skin:  No bleeding, No bruising, No rash    Pertinent laboratory and radiology data were reviewed:  Microbiology Results (last 10 days)     ** No results found for the last 240 hours. **          Medications Reviewed:     bisoprolol 5 mg Oral Q24H   docusate sodium 100 mg Oral BID   enoxaparin 40 mg Subcutaneous Daily   lisinopril 5 mg Oral Daily   pantoprazole 40 mg Intravenous Q AM   sodium chloride 3 mL Intravenous Q12H        Assessment /Plan   Assessment/Plan:   Nilay Odonnell is a(n) 69 y.o. year old male with:      1. Right colon carcinoma s/p right hemicolectomy 4/29/19 with Dr. Hinojosa  2. Acute on chronic microcytic anemia due to iron deficiency from #1 and post-op anemia of blood loss  3. Post-op ileus  4. Leukocytosis, post-operatively, suspect due to inflammation as no si/sx of infection  5. HTN  6. GERD with recent H pylori, started on treatment as outpatient 4/15/19  7. CAD status post MI  8. HLD       Hemoglobin improved appropriately with transfusion.  Emesis overnight, improved, tolerating PO clears.  Encouraged ambulation to improve post-op ileus.  No signs/symptoms of infection.  Repeat labs in  AM.        F/E/N: clears  DVT PPx: SCDs due to anemia  GI PPx: protonix    Treasure Diaz MD   4:01 PM on 5/1/2019

## 2019-05-01 NOTE — SIGNIFICANT NOTE
05/01/19 1015   Rehab Time/Intention   Evaluation Not Performed other (see comments)  (Upon attempt to eval pt for PT, patient observed ambulating in the hallways and in his room with RW independently. No skilled PT needs identified for this patient so new PT eval completed.)   Rehab Treatment   Discipline physical therapist

## 2019-05-01 NOTE — PROGRESS NOTES
LOS: 2 days   Patient Care Team:  Michele Burris MD as PCP - General  Michele Burris MD as PCP - Family Medicine  Kenneth Hinojosa MD as Consulting Physician (General Surgery)      Chief Complaint: Postop right colectomy      Interval History: Patient had some emesis last night, no difficulty breathing and some abdominal pain.  Patient had flatus last night a small bowel movement.  Still nauseated today.    Patient Complaints: None    History taken from: patient    Vital Signs  Temp:  [97.8 °F (36.6 °C)-98.7 °F (37.1 °C)] 98.3 °F (36.8 °C)  Heart Rate:  [76-94] 76  Resp:  [12-18] 14  BP: (104-138)/(50-70) 113/65    Physical Exam:     General Appearance:    Alert, cooperative, in no acute distress   Head:    Normocephalic, without obvious abnormality, atraumatic   Lungs:     Clear to auscultation,respirations regular, even and                  unlabored    Heart:    Regular rhythm and normal rate, normal S1 and S2, no            murmur, no gallop, no rub, no click   Abdomen:     Normal bowel sounds, no masses, no organomegaly, soft        and slightly distended.  Few high-pitched bowel sounds.   Extremities:   Moves all extremities well, no edema, no cyanosis, no             redness   Pulses:   Pulses palpable and equal bilaterally   Skin:   No bleeding, bruising or rash        Results Review:       Lab Results (last 24 hours)     Procedure Component Value Units Date/Time    CBC & Differential [[id number]] Collected:  05/01/19 0530    Specimen:  Blood Updated:  05/01/19 0658    Narrative:       The following orders were created for panel order CBC & Differential.  Procedure                               Abnormality         Status                     ---------                               -----------         ------                     Scan Slide[[id number]]                                       Final result               CBC Auto Differential[[id number]]        Abnormal            Final result                  Please view results for these tests on the individual orders.    CBC Auto Differential [[id number]]  (Abnormal) Collected:  05/01/19 0530    Specimen:  Blood Updated:  05/01/19 0658     WBC 22.43 10*3/mm3      RBC 5.09 10*6/mm3      Hemoglobin 10.5 g/dL      Hematocrit 35.8 %      MCV 70.3 fL      MCH 20.6 pg      MCHC 29.3 g/dL      RDW 22.9 %      RDW-SD 55.8 fl      MPV 9.8 fL      Platelets 477 10*3/mm3      Neutrophil % 85.5 %      Lymphocyte % 5.3 %      Monocyte % 8.2 %      Eosinophil % 0.0 %      Basophil % 0.2 %      Immature Grans % 0.8 %      Neutrophils, Absolute 19.17 10*3/mm3      Lymphocytes, Absolute 1.18 10*3/mm3      Monocytes, Absolute 1.85 10*3/mm3      Eosinophils, Absolute 0.00 10*3/mm3      Basophils, Absolute 0.05 10*3/mm3      Immature Grans, Absolute 0.18 10*3/mm3      nRBC 0.0 /100 WBC     Scan Slide [[id number]] Collected:  05/01/19 0530    Specimen:  Blood Updated:  05/01/19 0658     Anisocytosis Mod/2+     Elliptocytes Slight/1+     Hypochromia Slight/1+     Microcytes Slight/1+     Poikilocytes Slight/1+     Polychromasia Slight/1+     WBC Morphology Normal     Platelet Estimate Increased              Assessment/Plan       Malignant neoplasm of ascending colon (CMS/HCC)      Postop, doing well.  Persistent ileus.  Continue clear liquids only.  Ambulate in hallway 3 times daily.      Kenneth Hinojosa MD  05/01/19  8:13 AM

## 2019-05-02 LAB
ANION GAP SERPL CALCULATED.3IONS-SCNC: 14.8 MMOL/L (ref 10–20)
ANISOCYTOSIS BLD QL: NORMAL
BASOPHILS # BLD AUTO: 0.03 10*3/MM3 (ref 0–0.2)
BASOPHILS NFR BLD AUTO: 0.2 % (ref 0–1.5)
BUN BLD-MCNC: 16 MG/DL (ref 7–20)
BUN/CREAT SERPL: 16 (ref 6.3–21.9)
CALCIUM SPEC-SCNC: 8 MG/DL (ref 8.4–10.2)
CHLORIDE SERPL-SCNC: 104 MMOL/L (ref 98–107)
CO2 SERPL-SCNC: 20 MMOL/L (ref 26–30)
CREAT BLD-MCNC: 1 MG/DL (ref 0.6–1.3)
DACRYOCYTES BLD QL SMEAR: NORMAL
DEPRECATED RDW RBC AUTO: 57.1 FL (ref 37–54)
ELLIPTOCYTES BLD QL SMEAR: NORMAL
EOSINOPHIL # BLD AUTO: 0.09 10*3/MM3 (ref 0–0.4)
EOSINOPHIL NFR BLD AUTO: 0.5 % (ref 0.3–6.2)
ERYTHROCYTE [DISTWIDTH] IN BLOOD BY AUTOMATED COUNT: 23.7 % (ref 12.3–15.4)
GFR SERPL CREATININE-BSD FRML MDRD: 74 ML/MIN/1.73
GLUCOSE BLD-MCNC: 95 MG/DL (ref 74–98)
HCT VFR BLD AUTO: 33.4 % (ref 37.5–51)
HGB BLD-MCNC: 9.6 G/DL (ref 13–17.7)
IMM GRANULOCYTES # BLD AUTO: 0.15 10*3/MM3 (ref 0–0.05)
IMM GRANULOCYTES NFR BLD AUTO: 0.8 % (ref 0–0.5)
LYMPHOCYTES # BLD AUTO: 1.28 10*3/MM3 (ref 0.7–3.1)
LYMPHOCYTES NFR BLD AUTO: 6.8 % (ref 19.6–45.3)
MCH RBC QN AUTO: 20 PG (ref 26.6–33)
MCHC RBC AUTO-ENTMCNC: 28.7 G/DL (ref 31.5–35.7)
MCV RBC AUTO: 69.4 FL (ref 79–97)
MICROCYTES BLD QL: NORMAL
MONOCYTES # BLD AUTO: 1.4 10*3/MM3 (ref 0.1–0.9)
MONOCYTES NFR BLD AUTO: 7.5 % (ref 5–12)
NEUTROPHILS # BLD AUTO: 15.79 10*3/MM3 (ref 1.7–7)
NEUTROPHILS NFR BLD AUTO: 84.2 % (ref 42.7–76)
NRBC BLD AUTO-RTO: 0 /100 WBC (ref 0–0.2)
PLATELET # BLD AUTO: 366 10*3/MM3 (ref 140–450)
PMV BLD AUTO: 10 FL (ref 6–12)
POIKILOCYTOSIS BLD QL SMEAR: NORMAL
POLYCHROMASIA BLD QL SMEAR: NORMAL
POTASSIUM BLD-SCNC: 3.8 MMOL/L (ref 3.5–5.1)
RBC # BLD AUTO: 4.81 10*6/MM3 (ref 4.14–5.8)
SMALL PLATELETS BLD QL SMEAR: ADEQUATE
SODIUM BLD-SCNC: 135 MMOL/L (ref 137–145)
WBC MORPH BLD: NORMAL
WBC NRBC COR # BLD: 18.74 10*3/MM3 (ref 3.4–10.8)

## 2019-05-02 PROCEDURE — 97110 THERAPEUTIC EXERCISES: CPT

## 2019-05-02 PROCEDURE — 80048 BASIC METABOLIC PNL TOTAL CA: CPT | Performed by: HOSPITALIST

## 2019-05-02 PROCEDURE — 99024 POSTOP FOLLOW-UP VISIT: CPT | Performed by: SURGERY

## 2019-05-02 PROCEDURE — 85025 COMPLETE CBC W/AUTO DIFF WBC: CPT | Performed by: HOSPITALIST

## 2019-05-02 PROCEDURE — 25010000002 ENOXAPARIN PER 10 MG: Performed by: SURGERY

## 2019-05-02 PROCEDURE — 99231 SBSQ HOSP IP/OBS SF/LOW 25: CPT | Performed by: HOSPITALIST

## 2019-05-02 PROCEDURE — 25010000002 ONDANSETRON PER 1 MG: Performed by: SURGERY

## 2019-05-02 PROCEDURE — 85007 BL SMEAR W/DIFF WBC COUNT: CPT | Performed by: HOSPITALIST

## 2019-05-02 RX ADMIN — BISOPROLOL FUMARATE 5 MG: 5 TABLET ORAL at 09:34

## 2019-05-02 RX ADMIN — DOCUSATE SODIUM 100 MG: 100 CAPSULE, LIQUID FILLED ORAL at 09:34

## 2019-05-02 RX ADMIN — ONDANSETRON 4 MG: 2 INJECTION INTRAMUSCULAR; INTRAVENOUS at 12:47

## 2019-05-02 RX ADMIN — SODIUM CHLORIDE, PRESERVATIVE FREE 3 ML: 5 INJECTION INTRAVENOUS at 22:10

## 2019-05-02 RX ADMIN — SODIUM CHLORIDE 100 ML/HR: 9 INJECTION, SOLUTION INTRAVENOUS at 16:16

## 2019-05-02 RX ADMIN — LISINOPRIL 5 MG: 5 TABLET ORAL at 09:29

## 2019-05-02 RX ADMIN — SODIUM CHLORIDE 100 ML/HR: 9 INJECTION, SOLUTION INTRAVENOUS at 05:40

## 2019-05-02 RX ADMIN — DOCUSATE SODIUM 100 MG: 100 CAPSULE, LIQUID FILLED ORAL at 22:09

## 2019-05-02 RX ADMIN — PANTOPRAZOLE SODIUM 40 MG: 40 INJECTION, POWDER, FOR SOLUTION INTRAVENOUS at 05:27

## 2019-05-02 RX ADMIN — ENOXAPARIN SODIUM 40 MG: 40 INJECTION SUBCUTANEOUS at 09:35

## 2019-05-02 RX ADMIN — SODIUM CHLORIDE, PRESERVATIVE FREE 10 ML: 5 INJECTION INTRAVENOUS at 09:36

## 2019-05-02 RX ADMIN — ONDANSETRON 4 MG: 2 INJECTION INTRAMUSCULAR; INTRAVENOUS at 22:09

## 2019-05-02 RX ADMIN — ONDANSETRON 4 MG: 2 INJECTION INTRAMUSCULAR; INTRAVENOUS at 05:48

## 2019-05-02 NOTE — PROGRESS NOTES
LOS: 3 days   Patient Care Team:  Michele Burris MD as PCP - General  Michele Burris MD as PCP - Family Medicine  Kenneth Hinojosa MD as Consulting Physician (General Surgery)      Chief Complaint: Postop right colectomy      Interval History: Patient doing well, had a large bowel movement last night, still distended with some nausea.  Patient feels weak and tired.  Good urine output.  No vomiting.    Patient Complaints: None    History taken from: patient    Vital Signs  Temp:  [97.8 °F (36.6 °C)-99.3 °F (37.4 °C)] 98.5 °F (36.9 °C)  Heart Rate:  [79-82] 80  Resp:  [14-19] 16  BP: (105-116)/(60-62) 113/62    Physical Exam:     General Appearance:    Alert, cooperative, in no acute distress   Head:    Normocephalic, without obvious abnormality, atraumatic   Lungs:     Clear to auscultation,respirations regular, even and                  unlabored    Heart:    Regular rhythm and normal rate, normal S1 and S2, no            murmur, no gallop, no rub, no click   Abdomen:     Normal bowel sounds, no masses, no organomegaly, soft        and tender appropriate around the incision.  Wound looks good with no redness or drainage.   Extremities:   Moves all extremities well, no edema, no cyanosis, no             redness   Pulses:   Pulses palpable and equal bilaterally   Skin:   No bleeding, bruising or rash        Results Review:       Lab Results (last 24 hours)     Procedure Component Value Units Date/Time    Basic Metabolic Panel [[id number]]  (Abnormal) Collected:  05/02/19 0552    Specimen:  Blood Updated:  05/02/19 0708     Glucose 95 mg/dL      BUN 16 mg/dL      Creatinine 1.00 mg/dL      Sodium 135 mmol/L      Potassium 3.8 mmol/L      Chloride 104 mmol/L      CO2 20.0 mmol/L      Calcium 8.0 mg/dL      eGFR Non African Amer 74 mL/min/1.73      BUN/Creatinine Ratio 16.0     Anion Gap 14.8 mmol/L     Narrative:       GFR Normal >60  Chronic Kidney Disease <60  Kidney Failure <15    CBC & Differential [[id number]]  Collected:  05/02/19 0552    Specimen:  Blood Updated:  05/02/19 0705    Narrative:       The following orders were created for panel order CBC & Differential.  Procedure                               Abnormality         Status                     ---------                               -----------         ------                     Scan Slide[[id number]]                                       Final result               CBC Auto Differential[[id number]]        Abnormal            Final result                 Please view results for these tests on the individual orders.    CBC Auto Differential [[id number]]  (Abnormal) Collected:  05/02/19 0552    Specimen:  Blood Updated:  05/02/19 0705     WBC 18.74 10*3/mm3      RBC 4.81 10*6/mm3      Hemoglobin 9.6 g/dL      Hematocrit 33.4 %      MCV 69.4 fL      MCH 20.0 pg      MCHC 28.7 g/dL      RDW 23.7 %      RDW-SD 57.1 fl      MPV 10.0 fL      Platelets 366 10*3/mm3      Neutrophil % 84.2 %      Lymphocyte % 6.8 %      Monocyte % 7.5 %      Eosinophil % 0.5 %      Basophil % 0.2 %      Immature Grans % 0.8 %      Neutrophils, Absolute 15.79 10*3/mm3      Lymphocytes, Absolute 1.28 10*3/mm3      Monocytes, Absolute 1.40 10*3/mm3      Eosinophils, Absolute 0.09 10*3/mm3      Basophils, Absolute 0.03 10*3/mm3      Immature Grans, Absolute 0.15 10*3/mm3      nRBC 0.0 /100 WBC     Scan Slide [[id number]] Collected:  05/02/19 0552    Specimen:  Blood Updated:  05/02/19 0705     Anisocytosis Mod/2+     Dacrocytes Slight/1+     Elliptocytes Slight/1+     Microcytes Slight/1+     Poikilocytes Slight/1+     Polychromasia Slight/1+     WBC Morphology Normal     Platelet Estimate Adequate              Assessment/Plan       Malignant neoplasm of ascending colon (CMS/HCC)      Postop, doing well.  Patient still nauseous with some abdominal distention.  Will advance to GI soft diet today and hopefully can discharge tomorrow.  White blood cell count slightly decreased today.      Kenneth VERDUZCO  MD Ashish  05/02/19  8:01 AM

## 2019-05-02 NOTE — PROGRESS NOTES
[facility]    Name: Nilay Odonnell, 69 y.o. male  MRN: [id number], : 1950   Date of Admission: 2019   Date of Service: 19   PCP: Michele Burris MD     Hospital Course:   Nilay Odonnell is a(n) 69 y.o. year old male with a history of arthritis, GERD with recent H. pylori, CAD s/p MI, HTN, HLD, who was admitted on 2019 after laparoscopic right hemicolectomy for right colon carcinoma as noted to have localized severely nodular and ulcerated mucosa noted in ascending colon on colonoscopy done earlier this month.     He also had a EGD at that time and biopsies were positive for H. pylori, he was started on treatment for this.  Patient states he picked up medications on 4/15/19 and started them that evening.  Since last hospitalization he denies fevers, chills, chest pain or pressure, abdominal pain, nausea, vomiting, melena, hematochezia, hematuria, headache, dysphagia or odynophagia.  He does endorse occasional dizziness, however not to the extent as prior to last admission.     Post-operatively hemoglobin decreased to 7.4, required 1 unit PRBC transfusion with appropriate response.  PT and OT was consulted to encourage patient to ambulate.     Antibiotics:   unasyn fly-op  Completed therapy for H. pylori   Micro: none  -------------------------------------------------------------------------------------------------------------------  Subjective   Chief Complaint: f/u HTN and anemia     Subjective:   Patient seen and examined this morning.  Events from last night noted.  Discussed with MANDO Branham.  Had large bowel movement overnight diet has been advanced to GI bland per surgery.  Patient denies any productive cough or dysuria.  Notes abdominal distention has improved.    Review of Systems:   Gen-no fevers, no chills  CV-no chest pain, no palpitations  Resp-no cough, no dyspnea  GI-no N/D    Objective   Objective:     Intake/Output Summary (Last 24  hours) at 5/2/2019 1204  Last data filed at 5/2/2019 0900  Gross per 24 hour   Intake 1733.75 ml   Output 700 ml   Net 1033.75 ml      SpO2: 94 %     Physical Examination:   Vitals:    05/02/19 0605 05/02/19 0831 05/02/19 0929 05/02/19 1115   BP:  95/80 118/59 98/64   BP Location:  Right arm  Right arm   Patient Position:  Lying  Lying   Pulse:  89  77   Resp:  17  16   Temp:  98.7 °F (37.1 °C)  98.3 °F (36.8 °C)   TempSrc:  Oral  Oral   SpO2:  93%  94%   Weight: 98.1 kg (216 lb 3.2 oz)      Height:          General:  WDWN elderly male; no acute distress; resting in bed  Heart:   RRR, S1 S2 normal, no m/r/g  Lungs:   CTAB, no wheezes  Abdomen:  Obese, soft, NT, +BS, Steri-Strips in place no erythema at surgical site  Extremities: no edema/cyanosis/clubbing; SCDs in place  Neuro:  A&Ox3, no focal deficits  Psych:  appropriate mood  Skin:  No bleeding, No bruising, No rash    Pertinent laboratory and radiology data were reviewed:  Microbiology Results (last 10 days)     ** No results found for the last 240 hours. **          Medications Reviewed:     bisoprolol 5 mg Oral Q24H   docusate sodium 100 mg Oral BID   enoxaparin 40 mg Subcutaneous Daily   lisinopril 5 mg Oral Daily   pantoprazole 40 mg Intravenous Q AM   sodium chloride 3 mL Intravenous Q12H        Assessment /Plan   Assessment/Plan:   Nilay Odonnell is a(n) 69 y.o. year old male with:      1. Right colon carcinoma s/p right hemicolectomy 4/29/19 with Dr. Hinojosa  2. Acute on chronic microcytic anemia due to iron deficiency from #1 and post-op anemia of blood loss  3. Post-op ileus  4. Leukocytosis, post-operatively, suspect due to inflammation as no si/sx of infection  5. HTN  6. GERD with recent H pylori, started on treatment as outpatient 4/15/19  7. CAD status post MI  8. HLD       Leukocytosis is improved.  No localized signs or symptoms of infection.  Continue to encourage ambulation and incentive spirometer use.  Continue PT and OT.  Diet has been  advanced by surgery to GI bleed, with possible discharge home tomorrow.  Will repeat CBC in a.m.       F/E/N: GI bland  DVT PPx: SCDs due to anemia  GI PPx: protonix    Treasure Diaz MD   12:04 PM on 5/2/2019

## 2019-05-02 NOTE — PLAN OF CARE
Problem: Patient Care Overview  Goal: Plan of Care Review  Outcome: Ongoing (interventions implemented as appropriate)   05/01/19 2000 05/02/19 0401   Coping/Psychosocial   Plan of Care Reviewed With patient --    Plan of Care Review   Progress --  improving   OTHER   Outcome Summary --  VSS. No c/o pain/discomfort. Patient ambulating in the lei as ordered. Resting in bed. Will continue to monitor.        Problem: Surgery Nonspecified (Adult)  Intervention: Support Surgical and Anesthesia Recovery   05/01/19 2000 05/02/19 0000 05/02/19 0200   Incentive Spirometer (IS)   Incentive Spirometer Predicted Level (mL) 3000 --  --    Administration (IS) proper technique demonstrated --  --    Number of Repetitions (IS) 10 --  --    Level Incentive Spirometer (mL) 3000 --  --    Patient Tolerance (IS) --  good --    Safety Management   Safety Promotion/Fall Prevention --  --  activity supervised     Intervention: Monitor/Manage Pain   04/29/19 1034 05/01/19 1800   Promote Oxygenation/Perfusion   Pain Management Interventions medication offered but refused --    Safety Management   Medication Review/Management --  medications reviewed     Intervention: Prevent/Manage Post-surgical Infection   04/30/19 0000 05/02/19 0401   Safety Management   Infection Prevention --  rest/sleep promoted   Prevent/Manage Colorectal Surgical Infection   Fever Reduction/Comfort Measures medication administered --      Intervention: Monitor/Manage Postoperative Bleeding   04/29/19 1000   Safety Interventions   Bleeding Management dressing monitored     Intervention: Prevent/Manage Postoperative Nausea and Vomiting   05/01/19 1757 05/02/19 0200   Positioning   Head of Bed (HOB) --  HOB at 30-45 degrees   Monitor/Manage Hypovolemia   Nausea/Vomiting Interventions sips clear liquids given;slow deep breathing encouraged --      Intervention: Prevent/Manage Impaired Postoperative Elimination   05/02/19 0401   Promote Effective Bowel Elimination    Bowel Intervention adequate fluid intake promoted   Genitourinary () Interventions   Urinary Elimination Promotion frequent voiding encouraged     Intervention: Prevent/Manage DVT/VTE Risk   05/01/19 2000   Interventions   VTE Prevention/Management sequential compression devices on     Intervention: Optimize Psychosocial Response to the Surgical Experience   05/01/19 2000   Interventions   Trust Relationship/Rapport care explained;thoughts/feelings acknowledged       Goal: Signs and Symptoms of Listed Potential Problems Will be Absent, Minimized or Managed (Surgery Nonspecified)  Outcome: Ongoing (interventions implemented as appropriate)   04/30/19 1152   Goal/Outcome Evaluation   Problems Assessed (Surgery) all   Problems Present (Surgery) none     Goal: Anesthesia/Sedation Recovery  Outcome: Ongoing (interventions implemented as appropriate)   04/29/19 1000   Goal/Outcome Evaluation   Anesthesia/Sedation Recovery criteria met for transfer       Problem: Pain, Acute (Adult)  Intervention: Monitor and Manage Analgesia   05/01/19 1800 05/02/19 0401   Promote Effective Bowel Elimination   Bowel Intervention --  adequate fluid intake promoted   Safety Management   Medication Review/Management medications reviewed --      Intervention: Mutually Develop/Implement Acute Pain Management Plan   04/29/19 1034 05/02/19 0401   Cognitive Interventions   Sensory Stimulation Regulation --  lighting decreased   Promote Oxygenation/Perfusion   Pain Management Interventions medication offered but refused --      Intervention: Support/Optimize Psychosocial Response to Acute Pain   05/01/19 2000   Coping/Psychosocial Interventions   Supportive Measures active listening utilized   Interventions   Trust Relationship/Rapport care explained;thoughts/feelings acknowledged   Psychosocial Support   Diversional Activities television   Family/Support System Care support provided       Goal: Acceptable Pain Control/Comfort Level  Outcome:  Ongoing (interventions implemented as appropriate)   04/30/19 1152   Pain, Acute (Adult)   Acceptable Pain Control/Comfort Level making progress toward outcome

## 2019-05-02 NOTE — THERAPY DISCHARGE NOTE
Acute Care - Occupational Therapy Discharge Summary  [facility]     Patient Name: Nilay Odonnell  : 1950  MRN: [id number]    Today's Date: 2019  Onset of Illness/Injury or Date of Surgery: 19    Date of Referral to OT: 19  Referring Physician: Dr. Diaz      Admit Date: 2019        OT Recommendation and Plan    Visit Dx:    ICD-10-CM ICD-9-CM   1. Impaired mobility and ADLs Z74.09 799.89   2. Malignant neoplasm of ascending colon (CMS/HCC) C18.2 153.6         Time Calculation- OT     Row Name 19 1614             Time Calculation- OT    OT Start Time  1527  -SD      Total Timed Code Minutes- OT  16 minute(s)  -SD      OT Received On  19  -SD      OT Goal Re-Cert Due Date  05/10/19  -SD         Timed Charges    34261 - OT Therapeutic Exercise Minutes  10  -SD      04974 - OT Therapeutic Activity Minutes  6  -SD        User Key  (r) = Recorded By, (t) = Taken By, (c) = Cosigned By    Initials Name Provider Type    SD Magdalena Hussein OT Occupational Therapist            Rehab Goal Summary     Row Name 19 1527             Bed Mobility Goal 1 (OT)    Progress/Outcomes (Bed Mobility Goal 1, OT)  goal met  -SD         Transfer Goal 1 (OT)    Progress/Outcome (Transfer Goal 1, OT)  goal met  -SD         Dressing Goal 1 (OT)    Progress/Outcome (Dressing Goal 1, OT)  goal met  -SD         Toileting Goal 1 (OT)    Progress/Outcome (Toileting Goal 1, OT)  goal met  -SD         Strength Goal 1 (OT)    Progress/Outcome (Strength Goal 1, OT)  goal met  -SD         Functional Mobility Goal 1 (OT)    Progress/Outcome (Functional Mobility Goal 1, OT)  goal met  -SD        User Key  (r) = Recorded By, (t) = Taken By, (c) = Cosigned By    Initials Name Provider Type Discipline    SD Magdalena Hussein OT Occupational Therapist OT          Outcome Measures     Row Name 19 1527 19 1437          How much help from another is currently needed...    Putting on and taking  off regular lower body clothing?  4  -SD  3  -SD     Bathing (including washing, rinsing, and drying)  3  -SD  3  -SD     Toileting (which includes using toilet bed pan or urinal)  3  -SD  3  -SD     Putting on and taking off regular upper body clothing  4  -SD  3  -SD     Taking care of personal grooming (such as brushing teeth)  4  -SD  4  -SD     Eating meals  4  -SD  4  -SD     Score  22  -SD  20  -SD        Functional Assessment    Outcome Measure Options  AM-PAC 6 Clicks Daily Activity (OT)  -SD  AM-PAC 6 Clicks Daily Activity (OT)  -SD       User Key  (r) = Recorded By, (t) = Taken By, (c) = Cosigned By    Initials Name Provider Type    Magdalena Hills OT Occupational Therapist          Therapy Suggested Charges     Code   Minutes Charges    28658 (CPT®) Hc Ot Neuromusc Re Education Ea 15 Min      34532 (CPT®) Hc Ot Ther Proc Ea 15 Min 10 1    59235 (CPT®) Hc Ot Therapeutic Act Ea 15 Min 6     85071 (CPT®) Hc Ot Manual Therapy Ea 15 Min      92127 (CPT®) Hc Ot Iontophoresis Ea 15 Min      11088 (CPT®) Hc Ot Elec Stim Ea-Per 15 Min      93451 (CPT®) Hc Ot Ultrasound Ea 15 Min      14637 (CPT®) Hc Ot Self Care/Mgmt/Train Ea 15 Min      Total  16 1          Therapy Charges for Today     Code Description Service Date Service Provider Modifiers Qty    [id number] HC OT THER PROC EA 15 MIN 5/2/2019 Magdalena Hussein OT GO 1          OT Discharge Summary  Anticipated Discharge Disposition (OT): home with home health      Magdalena Hussein OT  5/2/2019

## 2019-05-02 NOTE — PLAN OF CARE
Problem: Patient Care Overview  Goal: Plan of Care Review  Outcome: Ongoing (interventions implemented as appropriate)   05/02/19 7294   Coping/Psychosocial   Plan of Care Reviewed With patient   Plan of Care Review   Progress no change   OTHER   Outcome Summary VSS. Continues to c/o nausea, zofran given. Ambulating in lei as ordered. Denies pain.       Problem: Surgery Nonspecified (Adult)  Goal: Signs and Symptoms of Listed Potential Problems Will be Absent, Minimized or Managed (Surgery Nonspecified)  Outcome: Ongoing (interventions implemented as appropriate)      Problem: Pain, Acute (Adult)  Goal: Acceptable Pain Control/Comfort Level  Outcome: Ongoing (interventions implemented as appropriate)

## 2019-05-02 NOTE — PLAN OF CARE
Problem: Patient Care Overview  Goal: Plan of Care Review  Outcome: Ongoing (interventions implemented as appropriate)   05/02/19 1612   Coping/Psychosocial   Plan of Care Reviewed With patient   Plan of Care Review   Progress improving   OTHER   Outcome Summary OT tx completed. Patient has met all OT goals. Patient completed bed mobility with cond ind, transfers with ind, functional mobility 412' using RW with SBA. Patient requires S/U-Sup for ADLs. Encouraged pt to continue walking halls with staff for safety. No further IP OT needs.

## 2019-05-02 NOTE — PROGRESS NOTES
Continued Stay Note   [facility]     Patient Name: Nilay Odonnell  MRN: [id number]  Today's Date: 5/2/2019    Admit Date: 4/29/2019    Discharge Plan     Row Name 05/02/19 1211       Plan    Plan  Spoke to pt in room.  Continues to deny discharge needs.          Discharge Codes    No documentation.       Expected Discharge Date and Time     Expected Discharge Date Expected Discharge Time    May 2, 2019             Nedra Gabriel

## 2019-05-03 LAB
ABO GROUP BLD: NORMAL
ANISOCYTOSIS BLD QL: NORMAL
BASOPHILS # BLD AUTO: 0.03 10*3/MM3 (ref 0–0.2)
BASOPHILS NFR BLD AUTO: 0.2 % (ref 0–1.5)
BLD GP AB SCN SERPL QL: NEGATIVE
DEPRECATED RDW RBC AUTO: 57.5 FL (ref 37–54)
ELLIPTOCYTES BLD QL SMEAR: NORMAL
EOSINOPHIL # BLD AUTO: 0.26 10*3/MM3 (ref 0–0.4)
EOSINOPHIL NFR BLD AUTO: 2 % (ref 0.3–6.2)
ERYTHROCYTE [DISTWIDTH] IN BLOOD BY AUTOMATED COUNT: 23.1 % (ref 12.3–15.4)
HCT VFR BLD AUTO: 28.8 % (ref 37.5–51)
HEMOCCULT STL QL: POSITIVE
HGB BLD-MCNC: 8.2 G/DL (ref 13–17.7)
HYPOCHROMIA BLD QL: NORMAL
IMM GRANULOCYTES # BLD AUTO: 0.06 10*3/MM3 (ref 0–0.05)
IMM GRANULOCYTES NFR BLD AUTO: 0.5 % (ref 0–0.5)
LAB AP CASE REPORT: NORMAL
LYMPHOCYTES # BLD AUTO: 1.42 10*3/MM3 (ref 0.7–3.1)
LYMPHOCYTES NFR BLD AUTO: 10.8 % (ref 19.6–45.3)
MCH RBC QN AUTO: 20.1 PG (ref 26.6–33)
MCHC RBC AUTO-ENTMCNC: 28.5 G/DL (ref 31.5–35.7)
MCV RBC AUTO: 70.8 FL (ref 79–97)
MICROCYTES BLD QL: NORMAL
MONOCYTES # BLD AUTO: 1 10*3/MM3 (ref 0.1–0.9)
MONOCYTES NFR BLD AUTO: 7.6 % (ref 5–12)
NEUTROPHILS # BLD AUTO: 10.36 10*3/MM3 (ref 1.7–7)
NEUTROPHILS NFR BLD AUTO: 78.9 % (ref 42.7–76)
NRBC BLD AUTO-RTO: 0 /100 WBC (ref 0–0.2)
PATH REPORT.FINAL DX SPEC: NORMAL
PLATELET # BLD AUTO: 341 10*3/MM3 (ref 140–450)
PMV BLD AUTO: 9.8 FL (ref 6–12)
RBC # BLD AUTO: 4.07 10*6/MM3 (ref 4.14–5.8)
RH BLD: POSITIVE
SMALL PLATELETS BLD QL SMEAR: ADEQUATE
T&S EXPIRATION DATE: NORMAL
WBC MORPH BLD: NORMAL
WBC NRBC COR # BLD: 13.13 10*3/MM3 (ref 3.4–10.8)

## 2019-05-03 PROCEDURE — P9016 RBC LEUKOCYTES REDUCED: HCPCS

## 2019-05-03 PROCEDURE — 82272 OCCULT BLD FECES 1-3 TESTS: CPT | Performed by: HOSPITALIST

## 2019-05-03 PROCEDURE — 86850 RBC ANTIBODY SCREEN: CPT | Performed by: HOSPITALIST

## 2019-05-03 PROCEDURE — 36430 TRANSFUSION BLD/BLD COMPNT: CPT

## 2019-05-03 PROCEDURE — 99024 POSTOP FOLLOW-UP VISIT: CPT | Performed by: SURGERY

## 2019-05-03 PROCEDURE — 86900 BLOOD TYPING SEROLOGIC ABO: CPT | Performed by: HOSPITALIST

## 2019-05-03 PROCEDURE — 86920 COMPATIBILITY TEST SPIN: CPT

## 2019-05-03 PROCEDURE — 86901 BLOOD TYPING SEROLOGIC RH(D): CPT | Performed by: HOSPITALIST

## 2019-05-03 PROCEDURE — 87338 HPYLORI STOOL AG IA: CPT | Performed by: HOSPITALIST

## 2019-05-03 PROCEDURE — 85007 BL SMEAR W/DIFF WBC COUNT: CPT | Performed by: HOSPITALIST

## 2019-05-03 PROCEDURE — 86900 BLOOD TYPING SEROLOGIC ABO: CPT

## 2019-05-03 PROCEDURE — 25010000002 ENOXAPARIN PER 10 MG: Performed by: SURGERY

## 2019-05-03 PROCEDURE — 99232 SBSQ HOSP IP/OBS MODERATE 35: CPT | Performed by: HOSPITALIST

## 2019-05-03 PROCEDURE — 85025 COMPLETE CBC W/AUTO DIFF WBC: CPT | Performed by: HOSPITALIST

## 2019-05-03 RX ORDER — METOPROLOL SUCCINATE 25 MG/1
25 TABLET, EXTENDED RELEASE ORAL
Status: DISCONTINUED | OUTPATIENT
Start: 2019-05-03 | End: 2019-05-07 | Stop reason: HOSPADM

## 2019-05-03 RX ADMIN — LISINOPRIL 5 MG: 5 TABLET ORAL at 09:00

## 2019-05-03 RX ADMIN — DOCUSATE SODIUM 100 MG: 100 CAPSULE, LIQUID FILLED ORAL at 09:00

## 2019-05-03 RX ADMIN — BISOPROLOL FUMARATE 5 MG: 5 TABLET ORAL at 08:59

## 2019-05-03 RX ADMIN — PANTOPRAZOLE SODIUM 40 MG: 40 INJECTION, POWDER, FOR SOLUTION INTRAVENOUS at 06:39

## 2019-05-03 RX ADMIN — SODIUM CHLORIDE, PRESERVATIVE FREE 3 ML: 5 INJECTION INTRAVENOUS at 20:17

## 2019-05-03 RX ADMIN — ENOXAPARIN SODIUM 40 MG: 40 INJECTION SUBCUTANEOUS at 09:00

## 2019-05-03 NOTE — PLAN OF CARE
Problem: Patient Care Overview  Goal: Plan of Care Review  Outcome: Ongoing (interventions implemented as appropriate)   05/03/19 1334   Coping/Psychosocial   Plan of Care Reviewed With patient   Plan of Care Review   Progress improving   OTHER   Outcome Summary H&H lower today. To recieve 2 units of PRBC. Denies pain. Stool sent. Ambulating in lei with assist       Problem: Surgery Nonspecified (Adult)  Goal: Signs and Symptoms of Listed Potential Problems Will be Absent, Minimized or Managed (Surgery Nonspecified)  Outcome: Ongoing (interventions implemented as appropriate)      Problem: Pain, Acute (Adult)  Goal: Acceptable Pain Control/Comfort Level  Outcome: Ongoing (interventions implemented as appropriate)      Problem: Skin Injury Risk (Adult)  Goal: Identify Related Risk Factors and Signs and Symptoms  Outcome: Outcome(s) achieved Date Met: 05/03/19    Goal: Skin Health and Integrity  Outcome: Ongoing (interventions implemented as appropriate)

## 2019-05-03 NOTE — PLAN OF CARE
Problem: Patient Care Overview  Goal: Plan of Care Review  Outcome: Ongoing (interventions implemented as appropriate)   05/03/19 0604   Coping/Psychosocial   Plan of Care Reviewed With patient   Plan of Care Review   Progress improving   OTHER   Outcome Summary Continue to have patient ambulate. Continue with PRN zofran for nausea.     Goal: Individualization and Mutuality  Outcome: Ongoing (interventions implemented as appropriate)    Goal: Discharge Needs Assessment  Outcome: Ongoing (interventions implemented as appropriate)    Goal: Interprofessional Rounds/Family Conf  Outcome: Ongoing (interventions implemented as appropriate)      Problem: Pain, Acute (Adult)  Goal: Acceptable Pain Control/Comfort Level  Outcome: Ongoing (interventions implemented as appropriate)

## 2019-05-03 NOTE — PROGRESS NOTES
[facility]    Name: Nilay Odonenll, 69 y.o. male  MRN: [id number], : 1950   Date of Admission: 2019   Date of Service: 19   PCP: Michele Burris MD     Hospital Course:   Nilay Odonnell is a(n) 69 y.o. year old male with a history of arthritis, GERD with recent H. pylori, CAD s/p MI, HTN, HLD, who was admitted on 2019 after laparoscopic right hemicolectomy for right colon carcinoma as noted to have localized severely nodular and ulcerated mucosa noted in ascending colon on colonoscopy done earlier this month.     He also had a EGD at that time and biopsies were positive for H. pylori, he was started on treatment for this.  Patient states he picked up medications on 4/15/19 and started them that evening.  Since last hospitalization he denies fevers, chills, chest pain or pressure, abdominal pain, nausea, vomiting, melena, hematochezia, hematuria, headache, dysphagia or odynophagia.  He does endorse occasional dizziness, however not to the extent as prior to last admission.     Post-operatively hemoglobin decreased to 7.4, required 1 unit PRBC transfusion with appropriate response.  PT and OT was consulted to encourage patient to ambulate.  Post-operatively also had some ileus, this resolved after 48hours and diet was advanced to GI bland.     Hemoglobin decreased again to 8.2, patient reported having black stools.     Antibiotics:   unasyn fly-op  Completed therapy for H. pylori   Micro: none  -------------------------------------------------------------------------------------------------------------------  Subjective   Chief Complaint: f/u HTN and anemia     Subjective:   Patient seen and examined this morning.  Events from last night noted.  Discussed with MANDO Cook.  Reports black stools overnight.  Tolerating some p.o.  Denies any dizziness or lightheadedness on ambulation.  Denies any chest pain or shortness of breath.    Review of Systems:    Gen-no fevers, no chills  CV-no chest pain, no palpitations  Resp-no cough, no dyspnea  GI-no N/D    Objective   Objective:     Intake/Output Summary (Last 24 hours) at 5/3/2019 1114  Last data filed at 5/3/2019 0630  Gross per 24 hour   Intake 2552.9 ml   Output 900 ml   Net 1652.9 ml      SpO2: 92 %     Physical Examination:   Vitals:    05/03/19 0420 05/03/19 0500 05/03/19 0919 05/03/19 1104   BP: 112/59  116/59 123/60   BP Location: Right arm  Right arm Right arm   Patient Position: Lying  Lying Lying   Pulse: 81  78 80   Resp: 18  19 16   Temp: 98.4 °F (36.9 °C)  98.2 °F (36.8 °C) 98.5 °F (36.9 °C)   TempSrc: Oral  Oral Oral   SpO2: 93%  92% 92%   Weight:  95.3 kg (210 lb 1.6 oz)     Height:          General:  WDWN elderly male; no acute distress; resting in bed  Heart:   RRR, S1 S2 normal, no m/r/g  Lungs:   CTAB, no wheezes  Abdomen:  Obese, soft, NT, +BS  Extremities: no edema/cyanosis/clubbing; SCDs in place  Neuro:  A&Ox3, no focal deficits  Psych:  appropriate mood   Skin:  No bleeding, No bruising, No rash    Pertinent laboratory and radiology data were reviewed:  Microbiology Results (last 10 days)     ** No results found for the last 240 hours. **          Medications Reviewed:     docusate sodium 100 mg Oral BID   enoxaparin 40 mg Subcutaneous Daily   lisinopril 5 mg Oral Daily   metoprolol succinate XL 25 mg Oral Q24H   pantoprazole 40 mg Intravenous Q AM   sodium chloride 3 mL Intravenous Q12H        Assessment /Plan   Assessment/Plan:   Nilay Odonnell is a(n) 69 y.o. year old male with:      1. Right colon carcinoma s/p right hemicolectomy 4/29/19 with Dr. Hinojosa  2. Acute on chronic microcytic anemia due to iron deficiency from #1 and post-op anemia of blood loss  3. Post-op ileus  4. Leukocytosis, post-operatively, suspect due to inflammation as no si/sx of infection  5. HTN  6. GERD with recent H pylori, completed therapy.  7. CAD status post MI  8. HLD       Leukocytosis is improved.  Will  transfuse 2 units PRBC, reports having melena initially last night.  Will discontinue Lovenox and start SCDs.  Denies any dizziness or lightheadedness.  Will discontinue IV fluids.  Continue PT and OT.  Will check stool for occult blood and H. Pylori.  Repeat CBC in AM.  Changed zebeta to toprol-xl due to national shortage, discussed conversion with pharmacist.        F/E/N: GI bland  DVT PPx: SCDs due to anemia  GI PPx: protonix    Treasure Diaz MD   11:14 AM on 5/3/2019

## 2019-05-04 ENCOUNTER — APPOINTMENT (OUTPATIENT)
Dept: GENERAL RADIOLOGY | Facility: HOSPITAL | Age: 69
End: 2019-05-04

## 2019-05-04 LAB
ABO + RH BLD: NORMAL
ABO + RH BLD: NORMAL
ANISOCYTOSIS BLD QL: NORMAL
BASOPHILS # BLD AUTO: 0.03 10*3/MM3 (ref 0–0.2)
BASOPHILS NFR BLD AUTO: 0.2 % (ref 0–1.5)
BH BB BLOOD EXPIRATION DATE: NORMAL
BH BB BLOOD EXPIRATION DATE: NORMAL
BH BB BLOOD TYPE BARCODE: 6200
BH BB BLOOD TYPE BARCODE: 6200
BH BB DISPENSE STATUS: NORMAL
BH BB DISPENSE STATUS: NORMAL
BH BB PRODUCT CODE: NORMAL
BH BB PRODUCT CODE: NORMAL
BH BB UNIT NUMBER: NORMAL
BH BB UNIT NUMBER: NORMAL
CROSSMATCH INTERPRETATION: NORMAL
CROSSMATCH INTERPRETATION: NORMAL
DEPRECATED RDW RBC AUTO: 59.7 FL (ref 37–54)
EOSINOPHIL # BLD AUTO: 0.19 10*3/MM3 (ref 0–0.4)
EOSINOPHIL NFR BLD AUTO: 1.3 % (ref 0.3–6.2)
ERYTHROCYTE [DISTWIDTH] IN BLOOD BY AUTOMATED COUNT: 23.1 % (ref 12.3–15.4)
HCT VFR BLD AUTO: 35.5 % (ref 37.5–51)
HGB BLD-MCNC: 10.6 G/DL (ref 13–17.7)
IMM GRANULOCYTES # BLD AUTO: 0.07 10*3/MM3 (ref 0–0.05)
IMM GRANULOCYTES NFR BLD AUTO: 0.5 % (ref 0–0.5)
LYMPHOCYTES # BLD AUTO: 1.02 10*3/MM3 (ref 0.7–3.1)
LYMPHOCYTES NFR BLD AUTO: 7.1 % (ref 19.6–45.3)
MCH RBC QN AUTO: 21.7 PG (ref 26.6–33)
MCHC RBC AUTO-ENTMCNC: 29.9 G/DL (ref 31.5–35.7)
MCV RBC AUTO: 72.7 FL (ref 79–97)
MONOCYTES # BLD AUTO: 0.95 10*3/MM3 (ref 0.1–0.9)
MONOCYTES NFR BLD AUTO: 6.6 % (ref 5–12)
NEUTROPHILS # BLD AUTO: 12.09 10*3/MM3 (ref 1.7–7)
NEUTROPHILS NFR BLD AUTO: 84.3 % (ref 42.7–76)
NRBC BLD AUTO-RTO: 0 /100 WBC (ref 0–0.2)
PLAT MORPH BLD: NORMAL
PLATELET # BLD AUTO: 332 10*3/MM3 (ref 140–450)
PMV BLD AUTO: 9.9 FL (ref 6–12)
POIKILOCYTOSIS BLD QL SMEAR: NORMAL
POLYCHROMASIA BLD QL SMEAR: NORMAL
RBC # BLD AUTO: 4.88 10*6/MM3 (ref 4.14–5.8)
UNIT  ABO: NORMAL
UNIT  ABO: NORMAL
UNIT  RH: NORMAL
UNIT  RH: NORMAL
WBC MORPH BLD: NORMAL
WBC NRBC COR # BLD: 14.35 10*3/MM3 (ref 3.4–10.8)

## 2019-05-04 PROCEDURE — 74018 RADEX ABDOMEN 1 VIEW: CPT

## 2019-05-04 PROCEDURE — 25010000002 ONDANSETRON PER 1 MG: Performed by: SURGERY

## 2019-05-04 PROCEDURE — 85007 BL SMEAR W/DIFF WBC COUNT: CPT | Performed by: HOSPITALIST

## 2019-05-04 PROCEDURE — 85025 COMPLETE CBC W/AUTO DIFF WBC: CPT | Performed by: HOSPITALIST

## 2019-05-04 PROCEDURE — 99231 SBSQ HOSP IP/OBS SF/LOW 25: CPT | Performed by: HOSPITALIST

## 2019-05-04 PROCEDURE — 99024 POSTOP FOLLOW-UP VISIT: CPT | Performed by: SURGERY

## 2019-05-04 RX ORDER — DOCUSATE SODIUM 100 MG/1
100 CAPSULE, LIQUID FILLED ORAL DAILY PRN
Status: DISCONTINUED | OUTPATIENT
Start: 2019-05-04 | End: 2019-05-04

## 2019-05-04 RX ORDER — DOCUSATE SODIUM 100 MG/1
100 CAPSULE, LIQUID FILLED ORAL DAILY
Status: DISCONTINUED | OUTPATIENT
Start: 2019-05-05 | End: 2019-05-04

## 2019-05-04 RX ADMIN — METOPROLOL SUCCINATE 25 MG: 25 TABLET, EXTENDED RELEASE ORAL at 10:00

## 2019-05-04 RX ADMIN — DOCUSATE SODIUM 100 MG: 100 CAPSULE, LIQUID FILLED ORAL at 10:00

## 2019-05-04 RX ADMIN — SODIUM CHLORIDE, PRESERVATIVE FREE 3 ML: 5 INJECTION INTRAVENOUS at 20:09

## 2019-05-04 RX ADMIN — ONDANSETRON 4 MG: 2 INJECTION INTRAMUSCULAR; INTRAVENOUS at 20:09

## 2019-05-04 RX ADMIN — SODIUM CHLORIDE, PRESERVATIVE FREE 3 ML: 5 INJECTION INTRAVENOUS at 10:00

## 2019-05-04 RX ADMIN — PANTOPRAZOLE SODIUM 40 MG: 40 INJECTION, POWDER, FOR SOLUTION INTRAVENOUS at 06:26

## 2019-05-04 RX ADMIN — LISINOPRIL 5 MG: 5 TABLET ORAL at 10:00

## 2019-05-04 NOTE — PROGRESS NOTES
LOS: 4 days   Patient Care Team:  Michele Burris MD as PCP - General  Michele Burris MD as PCP - Family Medicine  Kenneth Hinojosa MD as Consulting Physician (General Surgery)    Chief Complaint:   Post -operative day #4, hand-assisted laparoscopic right colectomy    Subjective     Interval History:     Mr. Odonnell is a 69-year-old male patient 4 days post hand-assisted lap scopic right colectomy for colon cancer.  He has been slowly recovering.  He had some nausea yesterday, which has subsequently resolved, although his appetite remains diminished.  He has been having bowel movements, and in fact did have 2 bowel movements overnight.  He reports having loose stools today.  He denies nausea or vomiting at the moment.  He does feel that his abdomen is distended.  He was noted to have decreased hemoglobin from 9.6, to 8.2 with reported black stools.  Occult blood testing was positive.  He was transfused 1 unit of packed red blood cells earlier today.  He is anxious for discharge home.      Objective     Vital Signs  Temp:  [96.7 °F (35.9 °C)-98.7 °F (37.1 °C)] 98.6 °F (37 °C)  Heart Rate:  [76-85] 80  Resp:  [16-20] 18  BP: (112-142)/(59-97) 126/65    PO 240mL  IV 2432.9 mL    UOP 1025mL  BM x 2      Physical Exam:  General Appearance alert, appears stated age and cooperative  Head normocephalic, without obvious abnormality and atraumatic  Lungs respirations regular, respirations even and respirations unlabored  CV: regular rate and rhythm  Abdomen tense and distended.  mild incisional tenderness.  wounds c/d/i   Extremities moves extremities well, no edema, no cyanosis and no redness  Neurologic Mental Status orientated to person, place, time and situation, Cranial Nerves cranial nerves 2 - 12 grossly intact as examined     Results Review:    I reviewed the patient's new clinical results.     Results from last 7 days   Lab Units 05/02/19  0552 04/30/19  0536   SODIUM mmol/L 135* 139   POTASSIUM mmol/L 3.8 4.1    CHLORIDE mmol/L 104 106   CO2 mmol/L 20.0* 23.0*   BUN mg/dL 16 10   CREATININE mg/dL 1.00 1.10   CALCIUM mg/dL 8.0* 8.4   GLUCOSE mg/dL 95 164*       Results from last 7 days   Lab Units 05/03/19  0525 05/02/19  0552 05/01/19  0530   WBC 10*3/mm3 13.13* 18.74* 22.43*   HEMOGLOBIN g/dL 8.2* 9.6* 10.5*   HEMATOCRIT % 28.8* 33.4* 35.8*   PLATELETS 10*3/mm3 341 366 477*           Scheduled Meds:  docusate sodium 100 mg Oral BID   lisinopril 5 mg Oral Daily   metoprolol succinate XL 25 mg Oral Q24H   pantoprazole 40 mg Intravenous Q AM   sodium chloride 3 mL Intravenous Q12H     Continuous Infusions:   PRN Meds:.HYDROcodone-acetaminophen  •  HYDROmorphone **AND** naloxone  •  ondansetron **OR** ondansetron  •  promethazine **OR** promethazine  •  sodium chloride    Medication Review:    Scheduled Meds:  docusate sodium 100 mg Oral BID   lisinopril 5 mg Oral Daily   metoprolol succinate XL 25 mg Oral Q24H   pantoprazole 40 mg Intravenous Q AM   sodium chloride 3 mL Intravenous Q12H     Continuous Infusions:   PRN Meds:.HYDROcodone-acetaminophen  •  HYDROmorphone **AND** naloxone  •  ondansetron **OR** ondansetron  •  promethazine **OR** promethazine  •  sodium chloride      Assessment/Plan       Malignant neoplasm of ascending colon (CMS/HCC)    Mr. Odonnell is a 69-year-old male patient status post hand-assisted laparoscopic right hemicolectomy on 4/29/2019 for definitive management of right colon cancer.  He has had return of his bowel function but still remains distended, with decreased oral intake.  He did have some worsening of his anemia overnight and has received an additional blood transfusion.  He is not appropriate for discharge home at present time due to multiple issues including his worsening anemia, his abdominal distention, and his bowel function.  I had a detailed discussion with the patient in the presence of his bedside nurse regarding the importance of being out of bed and ambulating as much as  possible.  He seems to be staying in bed most of the day, and actually remains in bed to take his meals.  We also discussed the importance of using his incentive spirometer.  I explained to him all the reasons he is not yet ready for discharge.  Perhaps, if he has stabilization of his anemia, and improvement in the amount of PO nutrition he can tolerate, and his abdominal exam is improved, he may be able to be discharged tomorrow.  He is agreeable to this.      Esperanza Mondragon MD  05/03/19  9:39 PM

## 2019-05-04 NOTE — PLAN OF CARE
Problem: Patient Care Overview  Goal: Plan of Care Review  Outcome: Ongoing (interventions implemented as appropriate)    Goal: Individualization and Mutuality  Outcome: Ongoing (interventions implemented as appropriate)    Goal: Discharge Needs Assessment  Outcome: Ongoing (interventions implemented as appropriate)    Goal: Interprofessional Rounds/Family Conf  Outcome: Ongoing (interventions implemented as appropriate)      Problem: Surgery Nonspecified (Adult)  Goal: Signs and Symptoms of Listed Potential Problems Will be Absent, Minimized or Managed (Surgery Nonspecified)  Outcome: Ongoing (interventions implemented as appropriate)    Goal: Anesthesia/Sedation Recovery  Outcome: Ongoing (interventions implemented as appropriate)      Problem: Pain, Acute (Adult)  Goal: Acceptable Pain Control/Comfort Level  Outcome: Ongoing (interventions implemented as appropriate)      Problem: Skin Injury Risk (Adult)  Goal: Skin Health and Integrity  Outcome: Ongoing (interventions implemented as appropriate)

## 2019-05-04 NOTE — PLAN OF CARE
Problem: Patient Care Overview  Goal: Plan of Care Review  Outcome: Ongoing (interventions implemented as appropriate)   05/04/19 0339   Coping/Psychosocial   Plan of Care Reviewed With patient   Plan of Care Review   Progress improving   OTHER   Outcome Summary Patient has denied pain and nausea. Monitor labs this morning. Patient to continue to ambulate.     Goal: Individualization and Mutuality  Outcome: Ongoing (interventions implemented as appropriate)    Goal: Discharge Needs Assessment  Outcome: Ongoing (interventions implemented as appropriate)    Goal: Interprofessional Rounds/Family Conf  Outcome: Ongoing (interventions implemented as appropriate)      Problem: Surgery Nonspecified (Adult)  Goal: Signs and Symptoms of Listed Potential Problems Will be Absent, Minimized or Managed (Surgery Nonspecified)  Outcome: Ongoing (interventions implemented as appropriate)    Goal: Anesthesia/Sedation Recovery  Outcome: Ongoing (interventions implemented as appropriate)      Problem: Pain, Acute (Adult)  Goal: Acceptable Pain Control/Comfort Level  Outcome: Ongoing (interventions implemented as appropriate)      Problem: Skin Injury Risk (Adult)  Goal: Skin Health and Integrity  Outcome: Ongoing (interventions implemented as appropriate)

## 2019-05-04 NOTE — PROGRESS NOTES
"     LOS: 5 days   Patient Care Team:  Michele Burris MD as PCP - General  Michele Burris MD as PCP - Family Medicine  Kenneth Hinojosa MD as Consulting Physician (General Surgery)    Chief Complaint: Postoperative day #5 hand-assisted laparoscopic right hemicolectomy    Subjective     Interval History:   Mr. Odonnell is now postoperative day #5 following hand-assisted laparoscopic right hemicolectomy for colon cancer.  He has continued to have nausea, and reports having an episode of emesis this morning.  He complains of a diminished appetite and \"a sour stomach.\"  He continues to have loose stools, which he now describes as \"yellowish-brown water.\"  He reports that his abdomen felt less distended this morning, but is now distended once again.  He is anxious to be discharged home, and is frustrated with his overall situation.    He received an additional unit of packed red blood cells yesterday with appropriate incrementation in his hemoglobin.    Objective     Vital Signs  Temp:  [96.7 °F (35.9 °C)-98.7 °F (37.1 °C)] 98.3 °F (36.8 °C)  Heart Rate:  [75-81] 81  Resp:  [16-20] 18  BP: (103-142)/(60-97) 130/69      PO 240mL  UOP x 4  BM x4      Physical Exam   Constitutional: He is oriented to person, place, and time. He appears well-developed and well-nourished.   HENT:   Head: Normocephalic and atraumatic.   Eyes: EOM are normal. Pupils are equal, round, and reactive to light.   Cardiovascular: Normal rate and regular rhythm.   Pulmonary/Chest: Effort normal and breath sounds normal.   Abdominal: Soft. He exhibits distension. There is no tenderness.   Incisions clean and dry with Steri-Strips in place    Noted tympany to percussion   Neurological: He is alert and oriented to person, place, and time.   Skin: Skin is warm and dry.   Psychiatric: He has a normal mood and affect. His behavior is normal.          Results Review:    I reviewed the patient's new clinical results.      Results from last 7 days   Lab Units " 05/02/19  0552 04/30/19  0536   SODIUM mmol/L 135* 139   POTASSIUM mmol/L 3.8 4.1   CHLORIDE mmol/L 104 106   CO2 mmol/L 20.0* 23.0*   BUN mg/dL 16 10   CREATININE mg/dL 1.00 1.10   CALCIUM mg/dL 8.0* 8.4   GLUCOSE mg/dL 95 164*       Results from last 7 days   Lab Units 05/04/19  0521 05/03/19  0525 05/02/19  0552   WBC 10*3/mm3 14.35* 13.13* 18.74*   HEMOGLOBIN g/dL 10.6* 8.2* 9.6*   HEMATOCRIT % 35.5* 28.8* 33.4*   PLATELETS 10*3/mm3 332 341 366         Medication Review:   Scheduled Meds:  lisinopril 5 mg Oral Daily   metoprolol succinate XL 25 mg Oral Q24H   pantoprazole 40 mg Intravenous Q AM   sodium chloride 3 mL Intravenous Q12H     Continuous Infusions:   PRN Meds:.•  HYDROcodone-acetaminophen  •  HYDROmorphone **AND** naloxone  •  ondansetron **OR** ondansetron  •  promethazine **OR** promethazine  •  sodium chloride      Assessment/Plan       Malignant neoplasm of ascending colon (CMS/HCC)    Mr. Odonnell is a 69-year-old male patient status post hand-assisted laparoscopic right hemicolectomy on 4/29/2019 for management of right colon cancer.  He unfortunately continues to have abdominal distention with vomiting this morning, and ongoing diarrhea.  I have discontinued his Colace, which was previously ordered to be given on a scheduled basis.  I have changed his diet to n.p.o. with ice chips and sips with meds.  He is agreeable to this.  I have ordered a stat KUB to further evaluate for ileus.  I am a bit concerned about his frequent loose stools based on his recent antibiotic exposure.  I would like to check a C. difficile toxin in him, but he has had stool softeners recently.  Therefore, we will wait 24 hours.  His leukocytosis had been improving, but has slightly worsened today.  His hemoglobin has improved and he has no evidence of overt GI bleeding at this point.  Further management will be pending his clinical course and the results of his KUB.      Esperanza Mondragon MD  05/04/19  2:33  PM        Addendum: KUB reviewed.  The abdomen is filled with dilated loops of small bowel with a noted gastric bubble. There appears to be some gas distally although this is difficult to appreciate with certainty due to the amount of gaseous distention of the overlying small bowel.  This would be consistent with an ileus.We will see how he progresses after 24 hours of bowel rest and discontinuation of his Colace.  CT may be warranted if he does not improve in the next 24 hours.

## 2019-05-04 NOTE — PROGRESS NOTES
[facility]    Name: Nilay Odonnell, 69 y.o. male  MRN: [id number], : 1950   Date of Admission: 2019   Date of Service: 19   PCP: Michele Burris MD     Hospital Course:   Nilay Odonnell is a(n) 69 y.o. year old male with a history of arthritis, GERD with recent H. pylori, CAD s/p MI, HTN, HLD, who was admitted on 2019 after laparoscopic right hemicolectomy for right colon carcinoma as noted to have localized severely nodular and ulcerated mucosa noted in ascending colon on colonoscopy done earlier this month.     He also had a EGD at that time and biopsies were positive for H. pylori, he was started on treatment for this.  Patient states he picked up medications on 4/15/19 and started them that evening.  Since last hospitalization he denies fevers, chills, chest pain or pressure, abdominal pain, nausea, vomiting, melena, hematochezia, hematuria, headache, dysphagia or odynophagia.  He does endorse occasional dizziness, however not to the extent as prior to last admission.     Post-operatively hemoglobin decreased to 7.4, required 1 unit PRBC transfusion with appropriate response.  PT and OT was consulted to encourage patient to ambulate.  Post-operatively also had some ileus, this resolved after 48hours and diet was advanced to GI bland.     Hemoglobin decreased again to 8.2, patient reported having black stools. Transfused 2 units with appropriate response. FOBT positive.      Antibiotics:   unasyn fly-op  Completed therapy for H. pylori   Micro: none  -------------------------------------------------------------------------------------------------------------------  Subjective   Chief Complaint: f/u HTN and anemia     Subjective:   Patient seen and examined this afternoon at 1:00pm.  Events from last night noted.  Discussed with MANDO Patel.  He reports going to bathroom q15min for bowel movements this morning, described as yellow-brown stools.  Denies any dizziness or lightheadedness on movement.  Not eating much as he is a selective eater. Reports walking 2x in hallway today.    Review of Systems:   Gen-no fevers, no chills  CV-no chest pain, no palpitations  Resp-no cough, no dyspnea  GI-no N/D    Objective   Objective:     Intake/Output Summary (Last 24 hours) at 5/4/2019 1324  Last data filed at 5/4/2019 0949  Gross per 24 hour   Intake 1371.6 ml   Output --   Net 1371.6 ml      SpO2: 94 %     Physical Examination:   Vitals:    05/04/19 0342 05/04/19 0500 05/04/19 0750 05/04/19 1140   BP: 103/85  126/62 130/69   BP Location: Right arm  Right arm Right arm   Patient Position: Lying  Lying Lying   Pulse: 75  77 81   Resp: 16  18 18   Temp: 98.2 °F (36.8 °C)  98.1 °F (36.7 °C) 98.3 °F (36.8 °C)   TempSrc: Oral  Oral Oral   SpO2: 96%  96% 94%   Weight:  96.9 kg (213 lb 9.6 oz)     Height:          General:  WDWN elderly male; no acute distress; resting in bed  Heart:   RRR, S1 S2 normal, no m/r/g  Lungs:   CTAB, no wheezes  Abdomen:  Obese, soft, NT, +BS, distended  Extremities: no edema/cyanosis/clubbing; SCDs in place  Neuro:  A&Ox3, no focal deficits  Psych:  appropriate mood   Skin:  No bleeding, No bruising, No rash    Pertinent laboratory and radiology data were reviewed:  Microbiology Results (last 10 days)     ** No results found for the last 240 hours. **          Medications Reviewed:     [START ON 5/5/2019] docusate sodium 100 mg Oral Daily   lisinopril 5 mg Oral Daily   metoprolol succinate XL 25 mg Oral Q24H   pantoprazole 40 mg Intravenous Q AM   sodium chloride 3 mL Intravenous Q12H        Assessment /Plan   Assessment/Plan:   Nilay Odonnell is a(n) 69 y.o. year old male with:      1. Right colon carcinoma s/p right hemicolectomy 4/29/19 with Dr. Hinojosa  2. Acute on chronic microcytic anemia due to iron deficiency from #1 and post-op anemia of blood loss  3. Post-op ileus  4. Leukocytosis, post-operatively, suspect due to inflammation as  no si/sx of infection  5. HTN  6. GERD with recent H pylori, completed therapy.  7. CAD status post MI  8. HLD       Leukocytosis stable. Reports frequent bowel movements, will change scheduled colace to daily prn.  Hemoglobin improved appropriately with 2 unit PRBC transfusion.  Encourage ambulation and PT/OT.  Encouraged PO intake, however patient selective about foods.  Inquired about any specific foods he prefers, stated he can take care of it at home.  If continues to have loose stools 24hours after last dose of colace, will check stool for Cdiff as patient has been on antibiotic therapy recently for H.pylori.          F/E/N: GI bland  DVT PPx: SCDs due to anemia  GI PPx: protonix    Treasure Diaz MD   1:24 PM on 5/4/2019

## 2019-05-05 ENCOUNTER — APPOINTMENT (OUTPATIENT)
Dept: GENERAL RADIOLOGY | Facility: HOSPITAL | Age: 69
End: 2019-05-05

## 2019-05-05 LAB
ANION GAP SERPL CALCULATED.3IONS-SCNC: 18.6 MMOL/L (ref 10–20)
ANISOCYTOSIS BLD QL: NORMAL
BASOPHILS # BLD AUTO: 0.04 10*3/MM3 (ref 0–0.2)
BASOPHILS NFR BLD AUTO: 0.2 % (ref 0–1.5)
BUN BLD-MCNC: 11 MG/DL (ref 7–20)
BUN/CREAT SERPL: 11 (ref 6.3–21.9)
CALCIUM SPEC-SCNC: 8.3 MG/DL (ref 8.4–10.2)
CHLORIDE SERPL-SCNC: 101 MMOL/L (ref 98–107)
CO2 SERPL-SCNC: 20 MMOL/L (ref 26–30)
CREAT BLD-MCNC: 1 MG/DL (ref 0.6–1.3)
DEPRECATED RDW RBC AUTO: 60 FL (ref 37–54)
EOSINOPHIL # BLD AUTO: 0.23 10*3/MM3 (ref 0–0.4)
EOSINOPHIL NFR BLD AUTO: 1.3 % (ref 0.3–6.2)
ERYTHROCYTE [DISTWIDTH] IN BLOOD BY AUTOMATED COUNT: 23.3 % (ref 12.3–15.4)
GFR SERPL CREATININE-BSD FRML MDRD: 74 ML/MIN/1.73
GLUCOSE BLD-MCNC: 72 MG/DL (ref 74–98)
HCT VFR BLD AUTO: 36.9 % (ref 37.5–51)
HGB BLD-MCNC: 11.1 G/DL (ref 13–17.7)
IMM GRANULOCYTES # BLD AUTO: 0.1 10*3/MM3 (ref 0–0.05)
IMM GRANULOCYTES NFR BLD AUTO: 0.6 % (ref 0–0.5)
LYMPHOCYTES # BLD AUTO: 1.38 10*3/MM3 (ref 0.7–3.1)
LYMPHOCYTES NFR BLD AUTO: 7.8 % (ref 19.6–45.3)
MCH RBC QN AUTO: 21.9 PG (ref 26.6–33)
MCHC RBC AUTO-ENTMCNC: 30.1 G/DL (ref 31.5–35.7)
MCV RBC AUTO: 72.9 FL (ref 79–97)
MICROCYTES BLD QL: NORMAL
MONOCYTES # BLD AUTO: 1.2 10*3/MM3 (ref 0.1–0.9)
MONOCYTES NFR BLD AUTO: 6.8 % (ref 5–12)
NEUTROPHILS # BLD AUTO: 14.7 10*3/MM3 (ref 1.7–7)
NEUTROPHILS NFR BLD AUTO: 83.3 % (ref 42.7–76)
NRBC BLD AUTO-RTO: 0 /100 WBC (ref 0–0.2)
PLATELET # BLD AUTO: 361 10*3/MM3 (ref 140–450)
PMV BLD AUTO: 9.4 FL (ref 6–12)
POIKILOCYTOSIS BLD QL SMEAR: NORMAL
POTASSIUM BLD-SCNC: 3.6 MMOL/L (ref 3.5–5.1)
RBC # BLD AUTO: 5.06 10*6/MM3 (ref 4.14–5.8)
SMALL PLATELETS BLD QL SMEAR: ADEQUATE
SODIUM BLD-SCNC: 136 MMOL/L (ref 137–145)
WBC MORPH BLD: NORMAL
WBC NRBC COR # BLD: 17.65 10*3/MM3 (ref 3.4–10.8)

## 2019-05-05 PROCEDURE — 25010000002 ONDANSETRON PER 1 MG: Performed by: SURGERY

## 2019-05-05 PROCEDURE — 99024 POSTOP FOLLOW-UP VISIT: CPT | Performed by: SURGERY

## 2019-05-05 PROCEDURE — 74018 RADEX ABDOMEN 1 VIEW: CPT

## 2019-05-05 PROCEDURE — 99231 SBSQ HOSP IP/OBS SF/LOW 25: CPT | Performed by: HOSPITALIST

## 2019-05-05 PROCEDURE — 85025 COMPLETE CBC W/AUTO DIFF WBC: CPT | Performed by: HOSPITALIST

## 2019-05-05 PROCEDURE — 80048 BASIC METABOLIC PNL TOTAL CA: CPT | Performed by: HOSPITALIST

## 2019-05-05 PROCEDURE — 85007 BL SMEAR W/DIFF WBC COUNT: CPT | Performed by: HOSPITALIST

## 2019-05-05 RX ORDER — CALCIUM CARBONATE 200(500)MG
1 TABLET,CHEWABLE ORAL 3 TIMES DAILY PRN
Status: DISCONTINUED | OUTPATIENT
Start: 2019-05-05 | End: 2019-05-07 | Stop reason: HOSPADM

## 2019-05-05 RX ADMIN — CALCIUM CARBONATE (ANTACID) CHEW TAB 500 MG 1 TABLET: 500 CHEW TAB at 22:22

## 2019-05-05 RX ADMIN — ONDANSETRON 4 MG: 2 INJECTION INTRAMUSCULAR; INTRAVENOUS at 05:08

## 2019-05-05 RX ADMIN — LISINOPRIL 5 MG: 5 TABLET ORAL at 09:56

## 2019-05-05 RX ADMIN — SODIUM CHLORIDE, PRESERVATIVE FREE 3 ML: 5 INJECTION INTRAVENOUS at 21:56

## 2019-05-05 RX ADMIN — SODIUM CHLORIDE, PRESERVATIVE FREE 3 ML: 5 INJECTION INTRAVENOUS at 09:00

## 2019-05-05 RX ADMIN — PANTOPRAZOLE SODIUM 40 MG: 40 INJECTION, POWDER, FOR SOLUTION INTRAVENOUS at 05:08

## 2019-05-05 RX ADMIN — SODIUM CHLORIDE, PRESERVATIVE FREE 10 ML: 5 INJECTION INTRAVENOUS at 05:08

## 2019-05-05 RX ADMIN — METOPROLOL SUCCINATE 25 MG: 25 TABLET, EXTENDED RELEASE ORAL at 09:56

## 2019-05-05 NOTE — PLAN OF CARE
Problem: Patient Care Overview  Goal: Plan of Care Review  Outcome: Ongoing (interventions implemented as appropriate)   05/05/19 0603 05/05/19 2908   Coping/Psychosocial   Plan of Care Reviewed With patient --    Plan of Care Review   Progress --  improving     Goal: Individualization and Mutuality  Outcome: Ongoing (interventions implemented as appropriate)    Goal: Discharge Needs Assessment  Outcome: Ongoing (interventions implemented as appropriate)    Goal: Interprofessional Rounds/Family Conf  Outcome: Ongoing (interventions implemented as appropriate)      Problem: Surgery Nonspecified (Adult)  Goal: Signs and Symptoms of Listed Potential Problems Will be Absent, Minimized or Managed (Surgery Nonspecified)  Outcome: Ongoing (interventions implemented as appropriate)      Problem: Pain, Acute (Adult)  Goal: Acceptable Pain Control/Comfort Level  Outcome: Ongoing (interventions implemented as appropriate)      Problem: Skin Injury Risk (Adult)  Goal: Skin Health and Integrity  Outcome: Ongoing (interventions implemented as appropriate)

## 2019-05-05 NOTE — PROGRESS NOTES
[facility]    Name: Nilay Odonnell, 69 y.o. male  MRN: [id number], : 1950   Date of Admission: 2019   Date of Service: 19   PCP: Michele Burris MD     Hospital Course:   Nilay Odonnell is a(n) 69 y.o. year old male with a history of arthritis, GERD with recent H. pylori, CAD s/p MI, HTN, HLD, who was admitted on 2019 after laparoscopic right hemicolectomy for right colon carcinoma as noted to have localized severely nodular and ulcerated mucosa noted in ascending colon on colonoscopy done earlier this month.     He also had a EGD at that time and biopsies were positive for H. pylori, he was started on treatment for this.  Patient states he picked up medications on 4/15/19 and started them that evening.  Since last hospitalization he denies fevers, chills, chest pain or pressure, abdominal pain, nausea, vomiting, melena, hematochezia, hematuria, headache, dysphagia or odynophagia.  He does endorse occasional dizziness, however not to the extent as prior to last admission.     Post-operatively hemoglobin decreased to 7.4, required 1 unit PRBC transfusion with appropriate response.  PT and OT was consulted to encourage patient to ambulate.  Post-operatively also had some ileus, this resolved after 48hours and diet was advanced to GI bland.     Hemoglobin decreased again to 8.2, patient reported having black stools. Transfused 2 units with appropriate response. FOBT positive.      Antibiotics:   unasyn fly-op  Completed therapy for H. pylori   Micro: none  -------------------------------------------------------------------------------------------------------------------  Subjective   Chief Complaint: f/u HTN and anemia     Subjective:   Patient seen and examined this morning.  Events from last night noted.  Discussed with RN Amanda.  Diarrhea has improved, reports stools are still liquid but less frequent than yesterday.  Has had a lot of  flatus.    Review of Systems:   Gen-no fevers, no chills  CV-no chest pain, no palpitations  Resp-no cough, no dyspnea  GI-no N/D    Objective   Objective:     Intake/Output Summary (Last 24 hours) at 5/5/2019 1040  Last data filed at 5/5/2019 0344  Gross per 24 hour   Intake 180 ml   Output --   Net 180 ml      SpO2: 94 %     Physical Examination:   Vitals:    05/05/19 0300 05/05/19 0500 05/05/19 0926 05/05/19 0956   BP: 115/67  120/51 120/51   BP Location: Right arm  Right arm    Patient Position: Lying  Lying    Pulse: 84  79 79   Resp: 18  16    Temp: 98.6 °F (37 °C)  98.4 °F (36.9 °C)    TempSrc: Oral  Oral    SpO2: 94%  94%    Weight:  96.6 kg (213 lb)     Height:          General:  WDWN elderly male; no acute distress; resting in bed  Heart:   RRR, S1 S2 normal, no m/r/g  Lungs:   CTAB, no wheezes  Abdomen:  Obese, soft, NT, +BS, abdomen soft distention improved  Extremities: no edema/cyanosis/clubbing  Neuro:  A&Ox3, no focal deficits  Psych:  appropriate mood   Skin:  No bleeding, No bruising, No rash    Pertinent laboratory and radiology data were reviewed:  Microbiology Results (last 10 days)     ** No results found for the last 240 hours. **          Medications Reviewed:     lisinopril 5 mg Oral Daily   metoprolol succinate XL 25 mg Oral Q24H   pantoprazole 40 mg Intravenous Q AM   sodium chloride 3 mL Intravenous Q12H        Assessment /Plan   Assessment/Plan:   Nilay Odonnell is a(n) 69 y.o. year old male with:      1. Right colon carcinoma s/p right hemicolectomy 4/29/19 with Dr. Hinojosa  2. Acute on chronic microcytic anemia due to iron deficiency from #1 and post-op anemia of blood loss  3. Post-op ileus  4. Leukocytosis, post-operatively, suspect due to inflammation as no si/sx of infection  5. HTN  6. GERD with recent H pylori, completed therapy.  7. CAD status post MI  8. HLD       Diarrhea seems to have resolved with discontinuation of scheduled Colace, leukocytosis is increased however.  Will  repeat CBC in a.m. and monitor for further bowel issues.  If further frequent liquid stools will need to C. difficile testing as has been on antibiotics recently.  Diet as per general surgery surgery.  Continue PT and OT and ambulation.  H. pylori stool antigen in process.        F/E/N: Diet per general surgery  DVT PPx: SCDs due to anemia  GI PPx: protonix    Treasure Diaz MD   10:40 AM on 5/5/2019

## 2019-05-05 NOTE — PLAN OF CARE
Problem: Patient Care Overview  Goal: Plan of Care Review  Outcome: Ongoing (interventions implemented as appropriate)   05/05/19 0603   Coping/Psychosocial   Plan of Care Reviewed With patient   Plan of Care Review   Progress no change   OTHER   Outcome Summary NO acute events overnight. patient rested well. continue to monitor.      Goal: Discharge Needs Assessment  Outcome: Ongoing (interventions implemented as appropriate)      Problem: Surgery Nonspecified (Adult)  Goal: Signs and Symptoms of Listed Potential Problems Will be Absent, Minimized or Managed (Surgery Nonspecified)  Outcome: Ongoing (interventions implemented as appropriate)    Goal: Anesthesia/Sedation Recovery  Outcome: Outcome(s) achieved Date Met: 05/05/19      Problem: Pain, Acute (Adult)  Goal: Acceptable Pain Control/Comfort Level  Outcome: Ongoing (interventions implemented as appropriate)      Problem: Skin Injury Risk (Adult)  Goal: Skin Health and Integrity  Outcome: Ongoing (interventions implemented as appropriate)

## 2019-05-05 NOTE — PROGRESS NOTES
LOS: 6 days   Patient Care Team:  Michele Burris MD as PCP - General  Michele Burris MD as PCP - Family Medicine  Kenneth Hinojosa MD as Consulting Physician (General Surgery)    Chief Complaint: Postoperative day #6 hand-assisted laparoscopic right hemicolectomy    Subjective     Interval History:     Mr. Odonnell reports passing a large amount of flatus yesterday evening, and this morning.  He reports that his abdomen feels much improved.  He states that he does not feel distended at all.  He has had resolution of his nausea and has had no further emesis.  He has been up and walking.  He has had one bowel movement that he describes as soft, but his previous diarrhea has resolved.  He is anxious to eat, and overall feels much much better compared to yesterday.  A repeat KUB from this morning demonstrates a significantly improved bowel gas pattern compared to yesterday.  There is air clearly seen in the colon.    Objective     Vital Signs  Temp:  [97.5 °F (36.4 °C)-98.7 °F (37.1 °C)] 98.6 °F (37 °C)  Heart Rate:  [77-85] 77  Resp:  [16-18] 16  BP: (115-130)/(51-76) 119/61       Void x4    BM x 1      Physical Exam:  General Appearance alert, appears stated age and cooperative  Head normocephalic, without obvious abnormality and atraumatic  Eyes lids and lashes normal, conjunctivae and sclerae normal, no icterus, no pallor, corneas clear and PERRLA  Lungs clear to auscultation, respirations regular, respirations even and respirations unlabored  Heart regular rhythm & normal rate, normal S1, S2, no murmur, no gallop, no rub and no click  Abdomen soft non-tender, no guarding and no rebound tenderness.  Incisions c/d/i  Extremities moves extremities well, no edema, no cyanosis and no redness  Skin no bleeding, bruising or rash  Neurologic Mental Status orientated to person, place, time and situation, Cranial Nerves cranial nerves 2 - 12 grossly intact as examined     Results Review:    I reviewed the patient's new  clinical results.      Results from last 7 days   Lab Units 05/05/19  0540 05/02/19  0552 04/30/19  0536   SODIUM mmol/L 136* 135* 139   POTASSIUM mmol/L 3.6 3.8 4.1   CHLORIDE mmol/L 101 104 106   CO2 mmol/L 20.0* 20.0* 23.0*   BUN mg/dL 11 16 10   CREATININE mg/dL 1.00 1.00 1.10   CALCIUM mg/dL 8.3* 8.0* 8.4   GLUCOSE mg/dL 72* 95 164*       Results from last 7 days   Lab Units 05/05/19  0540 05/04/19  0521 05/03/19  0525   WBC 10*3/mm3 17.65* 14.35* 13.13*   HEMOGLOBIN g/dL 11.1* 10.6* 8.2*   HEMATOCRIT % 36.9* 35.5* 28.8*   PLATELETS 10*3/mm3 361 332 341       Medication Review:   Scheduled Meds:  lisinopril 5 mg Oral Daily   metoprolol succinate XL 25 mg Oral Q24H   pantoprazole 40 mg Intravenous Q AM   sodium chloride 3 mL Intravenous Q12H     Continuous Infusions:   PRN Meds:.•  HYDROcodone-acetaminophen  •  HYDROmorphone **AND** naloxone  •  ondansetron **OR** ondansetron  •  promethazine **OR** promethazine  •  sodium chloride      Assessment/Plan       Malignant neoplasm of ascending colon (CMS/HCC)    Mr. Odonnell is a 69-year-old male patient status post hand-assisted laparoscopic right hemicolectomy on 4/29/2019 for management of right colon cancer.  His abdominal exam has markedly improved after being n.p.o. for the last 24 hours.  His bowel function seems to have improved as well.  He has stopped having diarrhea with discontinuation of his scheduled Colace.  He has passed a large amount of flatus, and had a semisolid bowel movement.  I have advanced him back to a regular diet and advised that he may have family bring food in from home given that he does not like the food being served in the hospital.  His leukocytosis is slightly worse today however this is accompanied by a rise in all of his cell lines on his CBC, and may be due to volume depletion.  Clinically, he looks very well overall, and has been afebrile and non-tachycardic.  I am hopeful that if he is able to tolerate a diet, he will be  appropriate for discharge home tomorrow.  At this point, I do not think that he requires axial imaging of the abdomen.      Esperanza Mondragon MD  05/05/19  12:41 PM

## 2019-05-06 ENCOUNTER — APPOINTMENT (OUTPATIENT)
Dept: CT IMAGING | Facility: HOSPITAL | Age: 69
End: 2019-05-06

## 2019-05-06 ENCOUNTER — APPOINTMENT (OUTPATIENT)
Dept: GENERAL RADIOLOGY | Facility: HOSPITAL | Age: 69
End: 2019-05-06

## 2019-05-06 LAB
ANISOCYTOSIS BLD QL: NORMAL
BACTERIA UR QL AUTO: ABNORMAL /HPF
BASOPHILS # BLD AUTO: 0.07 10*3/MM3 (ref 0–0.2)
BASOPHILS NFR BLD AUTO: 0.3 % (ref 0–1.5)
BILIRUB UR QL STRIP: NEGATIVE
C DIFF GDH STL QL: NEGATIVE
CLARITY UR: CLEAR
COLOR UR: YELLOW
DEPRECATED RDW RBC AUTO: 61.1 FL (ref 37–54)
EOSINOPHIL # BLD AUTO: 0.23 10*3/MM3 (ref 0–0.4)
EOSINOPHIL NFR BLD AUTO: 1.1 % (ref 0.3–6.2)
ERYTHROCYTE [DISTWIDTH] IN BLOOD BY AUTOMATED COUNT: 23.8 % (ref 12.3–15.4)
GLUCOSE UR STRIP-MCNC: NEGATIVE MG/DL
H PYLORI AG STL QL IA: NEGATIVE
HCT VFR BLD AUTO: 37.7 % (ref 37.5–51)
HGB BLD-MCNC: 11 G/DL (ref 13–17.7)
HGB UR QL STRIP.AUTO: NEGATIVE
HYALINE CASTS UR QL AUTO: ABNORMAL /LPF
IMM GRANULOCYTES # BLD AUTO: 0.17 10*3/MM3 (ref 0–0.05)
IMM GRANULOCYTES NFR BLD AUTO: 0.8 % (ref 0–0.5)
KETONES UR QL STRIP: ABNORMAL
LEUKOCYTE ESTERASE UR QL STRIP.AUTO: NEGATIVE
LYMPHOCYTES # BLD AUTO: 1.37 10*3/MM3 (ref 0.7–3.1)
LYMPHOCYTES NFR BLD AUTO: 6.6 % (ref 19.6–45.3)
MCH RBC QN AUTO: 21.5 PG (ref 26.6–33)
MCHC RBC AUTO-ENTMCNC: 29.2 G/DL (ref 31.5–35.7)
MCV RBC AUTO: 73.8 FL (ref 79–97)
MICROCYTES BLD QL: NORMAL
MONOCYTES # BLD AUTO: 1.25 10*3/MM3 (ref 0.1–0.9)
MONOCYTES NFR BLD AUTO: 6 % (ref 5–12)
NEUTROPHILS # BLD AUTO: 17.64 10*3/MM3 (ref 1.7–7)
NEUTROPHILS NFR BLD AUTO: 85.2 % (ref 42.7–76)
NITRITE UR QL STRIP: NEGATIVE
NRBC BLD AUTO-RTO: 0 /100 WBC (ref 0–0.2)
PH UR STRIP.AUTO: 6 [PH] (ref 5–8)
PLATELET # BLD AUTO: 349 10*3/MM3 (ref 140–450)
PMV BLD AUTO: 9.3 FL (ref 6–12)
PROT UR QL STRIP: ABNORMAL
RBC # BLD AUTO: 5.11 10*6/MM3 (ref 4.14–5.8)
RBC # UR: ABNORMAL /HPF
REF LAB TEST METHOD: ABNORMAL
SMALL PLATELETS BLD QL SMEAR: ADEQUATE
SP GR UR STRIP: 1.03 (ref 1–1.03)
SQUAMOUS #/AREA URNS HPF: ABNORMAL /HPF
UROBILINOGEN UR QL STRIP: ABNORMAL
WBC MORPH BLD: NORMAL
WBC NRBC COR # BLD: 20.73 10*3/MM3 (ref 3.4–10.8)
WBC UR QL AUTO: ABNORMAL /HPF

## 2019-05-06 PROCEDURE — 99232 SBSQ HOSP IP/OBS MODERATE 35: CPT | Performed by: NURSE PRACTITIONER

## 2019-05-06 PROCEDURE — 81001 URINALYSIS AUTO W/SCOPE: CPT | Performed by: NURSE PRACTITIONER

## 2019-05-06 PROCEDURE — 71045 X-RAY EXAM CHEST 1 VIEW: CPT

## 2019-05-06 PROCEDURE — 85025 COMPLETE CBC W/AUTO DIFF WBC: CPT | Performed by: HOSPITALIST

## 2019-05-06 PROCEDURE — 85007 BL SMEAR W/DIFF WBC COUNT: CPT | Performed by: HOSPITALIST

## 2019-05-06 PROCEDURE — 74177 CT ABD & PELVIS W/CONTRAST: CPT

## 2019-05-06 PROCEDURE — 87324 CLOSTRIDIUM AG IA: CPT | Performed by: NURSE PRACTITIONER

## 2019-05-06 PROCEDURE — 87449 NOS EACH ORGANISM AG IA: CPT | Performed by: NURSE PRACTITIONER

## 2019-05-06 PROCEDURE — 0 DIATRIZOATE MEGLUMINE & SODIUM PER 1 ML: Performed by: SURGERY

## 2019-05-06 PROCEDURE — 99024 POSTOP FOLLOW-UP VISIT: CPT | Performed by: SURGERY

## 2019-05-06 PROCEDURE — 25010000002 IOPAMIDOL 61 % SOLUTION: Performed by: SURGERY

## 2019-05-06 RX ORDER — SODIUM CHLORIDE 9 MG/ML
75 INJECTION, SOLUTION INTRAVENOUS CONTINUOUS
Status: DISCONTINUED | OUTPATIENT
Start: 2019-05-06 | End: 2019-05-07 | Stop reason: HOSPADM

## 2019-05-06 RX ADMIN — SODIUM CHLORIDE, PRESERVATIVE FREE 3 ML: 5 INJECTION INTRAVENOUS at 20:45

## 2019-05-06 RX ADMIN — PANTOPRAZOLE SODIUM 40 MG: 40 INJECTION, POWDER, FOR SOLUTION INTRAVENOUS at 06:16

## 2019-05-06 RX ADMIN — SODIUM CHLORIDE, PRESERVATIVE FREE 10 ML: 5 INJECTION INTRAVENOUS at 06:16

## 2019-05-06 RX ADMIN — SODIUM CHLORIDE, PRESERVATIVE FREE 3 ML: 5 INJECTION INTRAVENOUS at 08:53

## 2019-05-06 RX ADMIN — DIATRIZOATE MEGLUMINE AND DIATRIZOATE SODIUM 30 ML: 660; 100 LIQUID ORAL; RECTAL at 19:00

## 2019-05-06 RX ADMIN — SODIUM CHLORIDE 75 ML/HR: 9 INJECTION, SOLUTION INTRAVENOUS at 22:56

## 2019-05-06 RX ADMIN — LISINOPRIL 5 MG: 5 TABLET ORAL at 08:52

## 2019-05-06 RX ADMIN — METOPROLOL SUCCINATE 25 MG: 25 TABLET, EXTENDED RELEASE ORAL at 08:52

## 2019-05-06 RX ADMIN — ONDANSETRON HYDROCHLORIDE 4 MG: 4 TABLET, FILM COATED ORAL at 22:29

## 2019-05-06 RX ADMIN — IOPAMIDOL 100 ML: 612 INJECTION, SOLUTION INTRAVENOUS at 21:30

## 2019-05-06 NOTE — PROGRESS NOTES
PROGRESS NOTE        Date of Admission: 4/29/2019  Length of Stay: 7  Primary Care Physician: Michele Burris MD    Subjective   Chief Complaint:   HPI: This is a 69-year-old male with past medical history significant arthritis, gastroesophageal reflux and recent H. pylori, coronary artery disease status post MI, hypertension who was admitted on 4/29/2019 after undergoing a laparoscopic right hemicolectomy for right colon carcinoma.  Postoperatively he did require 3 units of packed red blood cells for anemia.  PT OT was consulted for strength and mobility.  Postoperatively he was also noted to have an ileus which has resolved.  At this time patient is lying comfortably in bed.  He denies any abdominal pain nausea or vomiting.  He has passed flatus and had a bowel movement.  He did have a fecal occult blood that was positive however this could be secondary to surgery.  Hemoglobin is currently stable and has been for the last 48 hours.  Operatively patient did have some diarrhea however it does appear that he was getting Colace scheduled.  Since stopping the Colace his diarrhea has resolved.  He has been advanced to regular diet.  Patient continues to have worsening leukocytosis.  He has no complaints           Review Of Systems:   Review of Systems   General ROS: Patient denies any fevers, chills or loss of consciousness.    Psychological ROS: Denies any hallucinations and delusions.  Ophthalmic ROS: Denies any diplopia or transient loss of vision.  ENT ROS: Denies sore throat, nasal congestion or ear pain.   Hematological and Lymphatic ROS: Denies neck swelling or easy bleeding.  Endocrine ROS: Denies any recent unintentional weight gain or loss.  Respiratory ROS: Denies cough or shortness of breath.   Cardiovascular ROS: Denies chest pain or palpitations. No history of exertional chest pain.   Gastrointestinal ROS: Denies nausea and vomiting. Denies any abdominal pain. No diarrhea.  Genito-Urinary ROS: Denies  dysuria or hematuria.  Musculoskeletal ROS: Denies back pain. No muscle pain. No calf pain.   Neurological ROS: Denies any focal weakness. No loss of consciousness. Denies any numbness. Denies neck pain.   Dermatological ROS: Denies any redness or pruritis.    Objective      Temp:  [97.3 °F (36.3 °C)-98.7 °F (37.1 °C)] 98.5 °F (36.9 °C)  Heart Rate:  [] 80  Resp:  [16-18] 16  BP: (114-124)/(55-70) 115/55  Physical Exam    General Appearance:  Alert and cooperative, not in any acute distress.   Head:  Atraumatic and normocephalic, without obvious abnormality.   Eyes:          PERRLA, conjunctivae and sclerae normal, no Icterus. No pallor. Extraocular movements are within normal limits.   Ears:  Ears appear intact with no abnormalities noted.   Throat: No oral lesions, no thrush, oral mucosa moist.   Neck: Supple, trachea midline, no thyromegaly, no carotid bruit.       Lungs:   Chest shape is normal. Breath sounds heard bilaterally equally.  No crackles or wheezing. No Pleural rub or bronchial breathing.   Heart:  Normal S1 and S2, no murmur, no gallop, no rub. No JVD   Abdomen:   Normal bowel sounds, no masses, no organomegaly. Soft    non-tender, non-distended, no guarding, no rebound             Tenderness, Incision is clean and dry   Extremities: Moves all extremities well, no edema, no cyanosis, no clubbing.   Pulses: Pulses palpable and equal bilaterally   Skin: No bleeding, bruising or rash       Neurologic:    Psychiatric/Behavior:     Cranial nerves 2 - 12 grossly intact, sensation intact, Motor power is normal and equal bilaterally.  Mood normal, behavior normal       Results Review:    I have reviewed the labs, radiology results and diagnostic studies.    Results from last 7 days   Lab Units 05/06/19  0601   WBC 10*3/mm3 20.73*   HEMOGLOBIN g/dL 11.0*   PLATELETS 10*3/mm3 349     Results from last 7 days   Lab Units 05/05/19  0540   SODIUM mmol/L 136*   POTASSIUM mmol/L 3.6   CO2 mmol/L 20.0*    CREATININE mg/dL 1.00   GLUCOSE mg/dL 72*       Culture Data:    Radiology Data:   Cardiology Data:    I have reviewed the medications.    Assessment/Plan     Assessment and Plan:  1.  Right colon carcinoma status post right hemicolectomy on 4/29/2019-surgery managing    2.  Acute blood loss anemia status post 3 units of packed red blood cells    3.  Postoperative ileus-resolving    4.  Chronic essential hypertension-blood pressure stable    5.  Gastroesophageal reflux with recent H. pylori completed therapy    6.  Coronary artery disease status post MI    7.  Dyslipidemia    8.  Leukocytosis-  Worsening.  Patient has no current complaints.          DVT prophylaxis: SCDs  Discharge Planning:   Radha Mcdaniel, KAI 05/06/19 12:13 PM

## 2019-05-06 NOTE — PLAN OF CARE
Problem: Patient Care Overview  Goal: Plan of Care Review  Outcome: Ongoing (interventions implemented as appropriate)   05/06/19 0549   Coping/Psychosocial   Plan of Care Reviewed With patient   Plan of Care Review   Progress no change   OTHER   Outcome Summary NO acute events overnight. Continue to monitor.      Goal: Discharge Needs Assessment  Outcome: Ongoing (interventions implemented as appropriate)      Problem: Surgery Nonspecified (Adult)  Goal: Signs and Symptoms of Listed Potential Problems Will be Absent, Minimized or Managed (Surgery Nonspecified)  Outcome: Ongoing (interventions implemented as appropriate)      Problem: Pain, Acute (Adult)  Goal: Acceptable Pain Control/Comfort Level  Outcome: Ongoing (interventions implemented as appropriate)      Problem: Skin Injury Risk (Adult)  Goal: Skin Health and Integrity  Outcome: Ongoing (interventions implemented as appropriate)

## 2019-05-07 ENCOUNTER — APPOINTMENT (OUTPATIENT)
Dept: GENERAL RADIOLOGY | Facility: HOSPITAL | Age: 69
End: 2019-05-07

## 2019-05-07 VITALS
DIASTOLIC BLOOD PRESSURE: 73 MMHG | RESPIRATION RATE: 16 BRPM | HEIGHT: 70 IN | HEART RATE: 86 BPM | WEIGHT: 213 LBS | OXYGEN SATURATION: 97 % | TEMPERATURE: 98.1 F | BODY MASS INDEX: 30.49 KG/M2 | SYSTOLIC BLOOD PRESSURE: 124 MMHG

## 2019-05-07 LAB
ANION GAP SERPL CALCULATED.3IONS-SCNC: 18.7 MMOL/L (ref 10–20)
ANISOCYTOSIS BLD QL: NORMAL
BASOPHILS # BLD AUTO: 0.06 10*3/MM3 (ref 0–0.2)
BASOPHILS NFR BLD AUTO: 0.3 % (ref 0–1.5)
BUN BLD-MCNC: 11 MG/DL (ref 7–20)
BUN/CREAT SERPL: 12.2 (ref 6.3–21.9)
CALCIUM SPEC-SCNC: 8.3 MG/DL (ref 8.4–10.2)
CHLORIDE SERPL-SCNC: 100 MMOL/L (ref 98–107)
CO2 SERPL-SCNC: 22 MMOL/L (ref 26–30)
CREAT BLD-MCNC: 0.9 MG/DL (ref 0.6–1.3)
DACRYOCYTES BLD QL SMEAR: NORMAL
DEPRECATED RDW RBC AUTO: 61.8 FL (ref 37–54)
ELLIPTOCYTES BLD QL SMEAR: NORMAL
EOSINOPHIL # BLD AUTO: 0.19 10*3/MM3 (ref 0–0.4)
EOSINOPHIL NFR BLD AUTO: 1.1 % (ref 0.3–6.2)
ERYTHROCYTE [DISTWIDTH] IN BLOOD BY AUTOMATED COUNT: 23.5 % (ref 12.3–15.4)
GFR SERPL CREATININE-BSD FRML MDRD: 83 ML/MIN/1.73
GLUCOSE BLD-MCNC: 91 MG/DL (ref 74–98)
HCT VFR BLD AUTO: 39.9 % (ref 37.5–51)
HGB BLD-MCNC: 11.9 G/DL (ref 13–17.7)
HYPOCHROMIA BLD QL: NORMAL
IMM GRANULOCYTES # BLD AUTO: 0.14 10*3/MM3 (ref 0–0.05)
IMM GRANULOCYTES NFR BLD AUTO: 0.8 % (ref 0–0.5)
LYMPHOCYTES # BLD AUTO: 1.24 10*3/MM3 (ref 0.7–3.1)
LYMPHOCYTES NFR BLD AUTO: 6.9 % (ref 19.6–45.3)
MCH RBC QN AUTO: 22.1 PG (ref 26.6–33)
MCHC RBC AUTO-ENTMCNC: 29.8 G/DL (ref 31.5–35.7)
MCV RBC AUTO: 74.2 FL (ref 79–97)
MICROCYTES BLD QL: NORMAL
MONOCYTES # BLD AUTO: 1.1 10*3/MM3 (ref 0.1–0.9)
MONOCYTES NFR BLD AUTO: 6.1 % (ref 5–12)
NEUTROPHILS # BLD AUTO: 15.33 10*3/MM3 (ref 1.7–7)
NEUTROPHILS NFR BLD AUTO: 84.8 % (ref 42.7–76)
NRBC BLD AUTO-RTO: 0 /100 WBC (ref 0–0.2)
PLATELET # BLD AUTO: 387 10*3/MM3 (ref 140–450)
PMV BLD AUTO: 9.5 FL (ref 6–12)
POIKILOCYTOSIS BLD QL SMEAR: NORMAL
POTASSIUM BLD-SCNC: 3.7 MMOL/L (ref 3.5–5.1)
RBC # BLD AUTO: 5.38 10*6/MM3 (ref 4.14–5.8)
SMALL PLATELETS BLD QL SMEAR: ADEQUATE
SODIUM BLD-SCNC: 137 MMOL/L (ref 137–145)
WBC MORPH BLD: NORMAL
WBC NRBC COR # BLD: 18.06 10*3/MM3 (ref 3.4–10.8)

## 2019-05-07 PROCEDURE — 25010000002 ONDANSETRON PER 1 MG: Performed by: SURGERY

## 2019-05-07 PROCEDURE — 85007 BL SMEAR W/DIFF WBC COUNT: CPT | Performed by: SURGERY

## 2019-05-07 PROCEDURE — 74018 RADEX ABDOMEN 1 VIEW: CPT

## 2019-05-07 PROCEDURE — 85025 COMPLETE CBC W/AUTO DIFF WBC: CPT | Performed by: SURGERY

## 2019-05-07 PROCEDURE — 80048 BASIC METABOLIC PNL TOTAL CA: CPT | Performed by: SURGERY

## 2019-05-07 PROCEDURE — 99024 POSTOP FOLLOW-UP VISIT: CPT | Performed by: SURGERY

## 2019-05-07 PROCEDURE — 99232 SBSQ HOSP IP/OBS MODERATE 35: CPT | Performed by: NURSE PRACTITIONER

## 2019-05-07 RX ORDER — ONDANSETRON 4 MG/1
4 TABLET, ORALLY DISINTEGRATING ORAL EVERY 6 HOURS PRN
Qty: 25 TABLET | Refills: 0 | Status: SHIPPED | OUTPATIENT
Start: 2019-05-07 | End: 2019-10-30

## 2019-05-07 RX ORDER — HYDROCODONE BITARTRATE AND ACETAMINOPHEN 7.5; 325 MG/1; MG/1
1 TABLET ORAL EVERY 4 HOURS PRN
Qty: 15 TABLET | Refills: 0 | Status: SHIPPED | OUTPATIENT
Start: 2019-05-07 | End: 2019-05-09

## 2019-05-07 RX ADMIN — PANTOPRAZOLE SODIUM 40 MG: 40 INJECTION, POWDER, FOR SOLUTION INTRAVENOUS at 05:32

## 2019-05-07 RX ADMIN — METOPROLOL SUCCINATE 25 MG: 25 TABLET, EXTENDED RELEASE ORAL at 09:54

## 2019-05-07 RX ADMIN — SODIUM CHLORIDE, PRESERVATIVE FREE 10 ML: 5 INJECTION INTRAVENOUS at 05:32

## 2019-05-07 RX ADMIN — ONDANSETRON 4 MG: 2 INJECTION INTRAMUSCULAR; INTRAVENOUS at 04:02

## 2019-05-07 RX ADMIN — LISINOPRIL 5 MG: 5 TABLET ORAL at 09:54

## 2019-05-07 NOTE — PROGRESS NOTES
PROGRESS NOTE        Date of Admission: 4/29/2019  Length of Stay: 8  Primary Care Physician: Michele Burris MD    Subjective   Chief Complaint:   HPI: This is a 69-year-old male with past medical history significant arthritis, gastroesophageal reflux and recent H. pylori, coronary artery disease status post MI, hypertension who was admitted on 4/29/2019 after undergoing a laparoscopic right hemicolectomy for right colon carcinoma.  Postoperatively he did require 3 units of packed red blood cells for anemia.  PT OT was consulted for strength and mobility.  Postoperatively he was also noted to have an ileus which has resolved.  At this time patient is lying comfortably in bed.  He denies any abdominal pain nausea or vomiting.  He has passed flatus and had a bowel movement.  He did have a fecal occult blood that was positive however this could be secondary to surgery.  Hemoglobin is currently stable and has been for the last 48 hours.  Operatively patient did have some diarrhea however it does appear that he was getting Colace scheduled.  Since stopping the Colace his diarrhea has resolved.  He has been advanced to regular diet.      Patient was noted to have worsening leukocytosis yesterday.  A UA, chest x-ray and C. difficile were done all of which are negative.  A repeat CT scan of the abdomen pelvis was performed last evening  Which showed a small bowel obstruction and they recommend follow-up of free fluid.  A repeat KUB has been ordered today.  His leukocytosis is starting to trend down as of today.  His white count is 18,000 down from 20,000 yesterday.  Again patient has no complaints other than some abdominal tenderness.  He denies any chest pain, shortness of breath, nausea, vomiting.         Review Of Systems: Systems reviewed on 5/7/2019 with the following findings:  Review of Systems  General ROS: Patient denies any fevers, chills or loss of consciousness.    Psychological ROS: Denies any hallucinations  and delusions.  Ophthalmic ROS: Denies any diplopia or transient loss of vision.  ENT ROS: Denies sore throat, nasal congestion or ear pain.   Hematological and Lymphatic ROS: Denies neck swelling or easy bleeding.  Endocrine ROS: Denies any recent unintentional weight gain or loss.  Respiratory ROS: Denies cough or shortness of breath.   Cardiovascular ROS: Denies chest pain or palpitations. No history of exertional chest pain.   Gastrointestinal ROS: Denies nausea and vomiting. Some diarrhea improving.  Abdomen is tender but not painful.  Genito-Urinary ROS: Denies dysuria or hematuria.  Musculoskeletal ROS: Denies back pain. No muscle pain. No calf pain.   Neurological ROS: Denies any focal weakness. No loss of consciousness. Denies any numbness. Denies neck pain.   Dermatological ROS: Denies any redness or pruritis.    Objective      Temp:  [98 °F (36.7 °C)-98.5 °F (36.9 °C)] 98.5 °F (36.9 °C)  Heart Rate:  [79-94] 90  Resp:  [16-20] 20  BP: (107-133)/(55-72) 107/61  Physical Exam   physical examination done on 5/7/2019 with the following findings:    General Appearance:  Alert and cooperative, not in any acute distress.   Head:  Atraumatic and normocephalic, without obvious abnormality.   Eyes:          PERRLA, conjunctivae and sclerae normal, no Icterus. No pallor. Extraocular movements are within normal limits.   Ears:  Ears appear intact with no abnormalities noted.   Throat: No oral lesions, no thrush, oral mucosa moist.   Neck: Supple, trachea midline, no thyromegaly, no carotid bruit.       Lungs:   Chest shape is normal. Breath sounds heard bilaterally equally.  No crackles or wheezing. No Pleural rub or bronchial breathing.   Heart:  Normal S1 and S2, no murmur, no gallop, no rub. No JVD   Abdomen:   Normal bowel sounds, no masses, no organomegaly. Soft    non-tender, non-distended, no guarding, no rebound             Tenderness, Incision is clean and dry   Extremities: Moves all extremities well, no  edema, no cyanosis, no clubbing.   Pulses: Pulses palpable and equal bilaterally   Skin: No bleeding, bruising or rash       Neurologic:    Psychiatric/Behavior:     Cranial nerves 2 - 12 grossly intact, sensation intact, Motor power is normal and equal bilaterally.  Mood normal, behavior normal       Results Review:    I have reviewed the labs, radiology results and diagnostic studies.    Results from last 7 days   Lab Units 05/07/19  0511   WBC 10*3/mm3 18.06*   HEMOGLOBIN g/dL 11.9*   PLATELETS 10*3/mm3 387     Results from last 7 days   Lab Units 05/07/19  0511   SODIUM mmol/L 137   POTASSIUM mmol/L 3.7   CO2 mmol/L 22.0*   CREATININE mg/dL 0.90   GLUCOSE mg/dL 91       Culture Data:    Radiology Data:   Cardiology Data:    I have reviewed the medications.    Assessment/Plan     Assessment and Plan:  1.  Right colon carcinoma status post right hemicolectomy on 4/29/2019-surgery managing    2.  Acute blood loss anemia status post 3 units of packed red blood cells.  CHELA globin is stable at 11.9.    3.  Postoperative ileus-resolving    4.  Chronic essential hypertension-blood pressure stable    5.  Gastroesophageal reflux with recent H. pylori completed therapy    6.  Coronary artery disease status post MI    7.  Dyslipidemia    8.  Leukocytosis-chest x-ray, UA and C. difficile have been negative.  CT scan was done of his abdomen pelvis which did show a small bowel obstruction.  His leukocytosis is mildly better today.        DVT prophylaxis: SCDs  Discharge Planning:   Radha Mcdaniel, KAI 05/07/19 9:49 AM

## 2019-05-07 NOTE — PROGRESS NOTES
LOS: 7 days   Patient Care Team:  Michele Burris MD as PCP - General  Michele Burris MD as PCP - Family Medicine  Kenneth Hinojosa MD as Consulting Physician (General Surgery)    Chief Complaint: Postoperative day #7 from hand assist laparoscopic right hemicolectomy.    Subjective     Interval History:     Mr. Odonnell reports that he is feeling very well today.  He states that he is anxious to be discharged home and feels well enough to leave the hospital.  He again reports that he does not feel distended at all.  He continues to pass flatus, and has had a thick, liquid bowel movement this morning.  He is voiding without difficulty.  He has tolerated his diet.  There were no acute events overnight.  Interestingly, his white blood cell count has risen to 20,000 on routine labs this morning.  Is also noted to have a left shift.  The source of this is not immediately clear.  He denies dysuria.  He reports that the consistency of his stool has thickened somewhat.  He does report a foul odor to the stool.    Objective     Vital Signs  Temp:  [97.3 °F (36.3 °C)-98.5 °F (36.9 °C)] 98 °F (36.7 °C)  Heart Rate:  [] 94  Resp:  [16-18] 16  BP: (114-133)/(55-71) 133/71      Physical Exam   Constitutional: He is oriented to person, place, and time. He appears well-developed and well-nourished.   HENT:   Head: Normocephalic and atraumatic.   Cardiovascular: Normal rate and regular rhythm.   Pulmonary/Chest: Effort normal and breath sounds normal. No respiratory distress.   Abdominal: Soft. He exhibits no distension. There is no tenderness.   Incisions clean dry and intact.  Steri-Strips removed to evaluate the wounds for cellulitic changes given the rising white count.  All the incisions are appropriate, and appear to be healing well.   Neurological: He is alert and oriented to person, place, and time.   Skin: Skin is warm and dry.   Psychiatric: He has a normal mood and affect. His behavior is normal.       Results  Review:    I reviewed the patient's new clinical results.      Results from last 7 days   Lab Units 05/05/19  0540 05/02/19  0552 04/30/19  0536   SODIUM mmol/L 136* 135* 139   POTASSIUM mmol/L 3.6 3.8 4.1   CHLORIDE mmol/L 101 104 106   CO2 mmol/L 20.0* 20.0* 23.0*   BUN mg/dL 11 16 10   CREATININE mg/dL 1.00 1.00 1.10   CALCIUM mg/dL 8.3* 8.0* 8.4   GLUCOSE mg/dL 72* 95 164*       Results from last 7 days   Lab Units 05/06/19  0601 05/05/19  0540 05/04/19  0521   WBC 10*3/mm3 20.73* 17.65* 14.35*   HEMOGLOBIN g/dL 11.0* 11.1* 10.6*   HEMATOCRIT % 37.7 36.9* 35.5*   PLATELETS 10*3/mm3 349 361 332     PORTABLE CHEST     INDICATION: Leukocytosis.     FINDINGS: Single frontal portable chest without comparison. Minimal  scarring or atelectasis in the right base. Heart size is normal. Aortic  calcifications. No pneumothorax. No other consolidation or effusion.  Degenerative changes in the spine and shoulders.     IMPRESSION:  Minimal scarring or atelectasis at the right base. Followup  PA and lateral views would be helpful for a more complete evaluation.     This report was finalized on 5/6/2019 1:20 PM by Jesse Garcias MD.    Urinalysis: 4+ ketones, 1+ protein, negative for leukocyte esterase, negative for nitrites, 0-2 RBCs per high-powered field, 0-2 WBCs per high-power field, no bacteria, 0-2 squamous epithelial cells.      C. difficile negative          Medication Review:     Scheduled Meds:  [COMPLETED] iopamidol 100 mL Intravenous Once in imaging   lisinopril 5 mg Oral Daily   metoprolol succinate XL 25 mg Oral Q24H   pantoprazole 40 mg Intravenous Q AM   sodium chloride 3 mL Intravenous Q12H     Continuous Infusions:   PRN Meds:.•  calcium carbonate  •  HYDROcodone-acetaminophen  •  HYDROmorphone **AND** naloxone  •  ondansetron **OR** ondansetron  •  promethazine **OR** promethazine  •  sodium chloride      Assessment/Plan       Malignant neoplasm of ascending colon (CMS/HCC)    Mr. Odonnell is a 69-year-old male  patient status post hand-assisted laparoscopic right hemicolectomy on 4/29/2019 for management of right colon cancer.  His abdominal exam today is completely benign, and he has tolerated a diet.  In fact, clinically he looks the most well that he has the entire time that I have been seeing him postoperatively.  I am concerned about this unexplained leukocytosis.  This morning, we have checked a chest x-ray, C. difficile toxin, and urinalysis, all of which have been negative with regards to the source of his leukocytosis.  I have discussed this with the patient at length, do not think it is appropriate for him to be discharged home today given this worsening of his labs.  I have ordered a CT scan of the abdomen pelvis with oral and IV contrast to investigate for an intra-abdominal source of his leukocytosis.  I discussed this with the patient, and he is agreeable.  While he is disappointed that he is not being discharged home, he does understand the rationale for this and does understand that all of this testing is in his best interest.  Further management will be based on the CT scan findings.  I have restarted him on IV fluids given the finding of 4+ ketones in his urinalysis which would suggest volume depletion.      Esperanza Mondragon MD  05/06/19  9:23 PM

## 2019-05-07 NOTE — PLAN OF CARE
Problem: Surgery Nonspecified (Adult)  Goal: Signs and Symptoms of Listed Potential Problems Will be Absent, Minimized or Managed (Surgery Nonspecified)  Outcome: Ongoing (interventions implemented as appropriate)   05/04/19 0895   Goal/Outcome Evaluation   Problems Assessed (Surgery) all   Problems Present (Surgery) bowel motility decreased;situational response

## 2019-05-08 ENCOUNTER — READMISSION MANAGEMENT (OUTPATIENT)
Dept: CALL CENTER | Facility: HOSPITAL | Age: 69
End: 2019-05-08

## 2019-05-08 NOTE — OUTREACH NOTE
Prep Survey      Responses   Facility patient discharged from?  [facility]   Is patient eligible?  Yes   Discharge diagnosis  S/P laparoscopic right hemicolectomy for right colon carcinoma   Does the patient have one of the following disease processes/diagnoses(primary or secondary)?  General Surgery   Does the patient have Home health ordered?  No   Is there a DME ordered?  No   Comments regarding appointments  See AVS   Prep survey completed?  Yes          Latosha Ward RN

## 2019-05-08 NOTE — PROGRESS NOTES
Case Management Discharge Note         Destination      No service has been selected for the patient.      Durable Medical Equipment      No service has been selected for the patient.      Dialysis/Infusion      No service has been selected for the patient.      Home Medical Care      No service has been selected for the patient.      Therapy      No service has been selected for the patient.      Community Resources      No service has been selected for the patient.        Transportation Services  Other: (pvt car)    Final Discharge Disposition Code: 01 - home or self-care

## 2019-05-10 ENCOUNTER — OFFICE VISIT (OUTPATIENT)
Dept: SURGERY | Facility: CLINIC | Age: 69
End: 2019-05-10

## 2019-05-10 VITALS
BODY MASS INDEX: 29.38 KG/M2 | HEART RATE: 70 BPM | TEMPERATURE: 97.9 F | DIASTOLIC BLOOD PRESSURE: 70 MMHG | HEIGHT: 70 IN | OXYGEN SATURATION: 98 % | SYSTOLIC BLOOD PRESSURE: 100 MMHG | WEIGHT: 205.2 LBS

## 2019-05-10 DIAGNOSIS — Z48.89 POSTOPERATIVE VISIT: Primary | ICD-10-CM

## 2019-05-10 PROCEDURE — 99024 POSTOP FOLLOW-UP VISIT: CPT | Performed by: SURGERY

## 2019-05-10 NOTE — PROGRESS NOTES
"Patient: Nilay Odonnell    YOB: 1950    Date: 05/10/2019    Primary Care Provider: Michele Burris MD    Reason for Consultation: Follow-up Colectomy    Chief Complaint   Patient presents with   • Post-op     PO Colectomy        History of present illness:  I saw the patient in the office today as a followup from their recent colon resection, pathology did show moderately differentiated adenocarcinoma with mucinous features, tumor invades into pericolic tissue, nineteen negative lymph nodes. They state that they have done well and are having no complaints.    The following portions of the patient's history were reviewed and updated as appropriate: allergies, current medications, past family history, past medical history, past social history, past surgical history and problem list.    Vital Signs:   Vitals:    05/10/19 1305   BP: 100/70   Pulse: 70   Temp: 97.9 °F (36.6 °C)   TempSrc: Temporal   SpO2: 98%   Weight: 93.1 kg (205 lb 3.2 oz)   Height: 177.8 cm (70\")       Physical Exam:   General Appearance:    Alert, cooperative, in no acute distress   Abdomen:     no masses, no organomegaly, soft non-tender, non-distended, no guarding, wounds are well healed, no evidence of recurrent hernia.  Wound looks good, no redness or drainage.         Assessment / Plan:    1. Postoperative visit        I did discuss the situation with the patient today in the office and they have done well from their recent colon resection.  I have released the patient back to normal activity, they understand that they need to be careful about heavy lifting.  I need to see the patient back in the office only if they are having further problems, they know to call me if they are.  Patient is scheduled to see oncology for evaluation of possible adjuvant therapy.  Recommend repeat EGD in 3 months to evaluate gastric ulcers.  Also will need repeat colonoscopy in 1 year.  Continue PPI medication for duodenal and gastric ulcer " disease.    Electronically signed by Kenneth Hinojosa MD  05/10/19

## 2019-05-10 NOTE — DISCHARGE SUMMARY
Date of Discharge:  5/7/2019    Discharge Diagnosis:   Active Hospital Problems    Diagnosis  POA   • **Malignant neoplasm of ascending colon (CMS/HCC) [C18.2]  Yes     History of Present Illness/Hospital Course:  Mr. Odonnell is a 69-year-old male patient with a history significant for gastroesophageal reflux disease, recent treatment for H. pylori associated gastritis, coronary artery disease status post myocardial infarction, hypertension, and arthritis, who recently was admitted with significant GI bleeding and underwent both upper endoscopy and colonoscopy by Dr. Hinojosa.  He was found to have a bleeding peptic ulcer which was treated endoscopically, and also was noted to have an area of ulcerated, nodular mucosa in the descending colon, which was later proven on biopsies to be consistent with an invasive, moderately differentiated adenocarcinoma.  He was followed up as an outpatient, and it was recommended that segmental resection with hand-assisted laparoscopic right hemicolectomy be undertaken for definitive surgical management of the patient's colon cancer.  This was performed on 4/29/2019 by Dr. Hinojosa.      Postoperatively, the patient was admitted for ongoing postoperative management.  He was followed by the hospitalist service as well as the surgical service.  His postoperative course was comp gated by anemia requiring a total of 3 units of packed red blood cells.  He also had a somewhat protracted course due to the development of a postoperative ileus.  This eventually resolved with bowel rest.  He had a persistent leukocytosis beginning immediately postoperatively which was worked up extensively while he was admitted as an inpatient.  In the 1 to 2 days leading up to discharge, he had a negative urinalysis, negative chest x-ray, negative C. difficile testing.  A repeat CT scan of the abdomen pelvis was also performed and did not show any evidence of abscess or leak.  There was concern by the radiologist  for a possible small bowel obstruction, but at the time, the patient was asymptomatic regarding his abdominal exam, was tolerating a diet, and was having multiple loose stools throughout the day every day.  On the day of discharge, a repeat plain film of the abdomen demonstrated gas in the colon.  His leukocytosis was beginning to trend down at that point and he had been afebrile throughout his course.  His hemoglobin had been stable for several days and finally on postoperative day #8 he was felt to have met all criteria for discharge home.  I had a detailed and extensive discussion with the patient regarding his postoperative discharge instructions.  Given that he had developed his ileus midway through his hospitalization, we did have a detailed discussion regarding how he should advance his diet at home, and the signs and symptoms of concern related to his bowel function.  We also discussed his persistently elevated white blood cell count.  However, he felt comfortable being discharged home with very short interval follow-up with Dr. Hinojosa in the office 3 days post discharge.  He was discharged home in good condition with a family friend.  He was advised to follow-up on Friday, 5/10/2019 at 1 PM.      Procedures Performed    Procedure(s):  COLECTOMY LAPAROSCOPIC HAND ASSISTED  -------------------       Consults:   Consults     Date and Time Order Name Status Description    4/29/2019 1020 Inpatient Hospitalist Consult Completed           Pertinent Test Results:   Results from last 7 days   Lab Units 05/07/19  0511 05/05/19  0540   SODIUM mmol/L 137 136*   POTASSIUM mmol/L 3.7 3.6   CHLORIDE mmol/L 100 101   CO2 mmol/L 22.0* 20.0*   BUN mg/dL 11 11   CREATININE mg/dL 0.90 1.00   CALCIUM mg/dL 8.3* 8.3*   GLUCOSE mg/dL 91 72*       Results from last 7 days   Lab Units 05/07/19  0511 05/06/19  0601 05/05/19  0540   WBC 10*3/mm3 18.06* 20.73* 17.65*   HEMOGLOBIN g/dL 11.9* 11.0* 11.1*   HEMATOCRIT % 39.9 37.7 36.9*  "  PLATELETS 10*3/mm3 387 349 361           Condition on Discharge:  stable    Vital Signs  /73 (BP Location: Right arm, Patient Position: Lying)   Pulse 86   Temp 98.1 °F (36.7 °C) (Oral)   Resp 16   Ht 177.8 cm (70\")   Wt 96.6 kg (213 lb)   SpO2 97%   BMI 30.56 kg/m²     Physical Exam:  General Appearance alert, appears stated age and cooperative  Head normocephalic, without obvious abnormality and atraumatic  Eyes lids and lashes normal, conjunctivae and sclerae normal, no icterus, no pallor, corneas clear and PERRLA  Lungs clear to auscultation, respirations regular, respirations even and respirations unlabored  Heart regular rhythm & normal rate, normal S1, S2, no murmur, no gallop, no rub and no click  Abdomen soft non-tender, no guarding, no rebound tenderness and incisions c/d/i  Extremities moves extremities well, no edema, no cyanosis and no redness  Skin no bleeding, bruising or rash  Neurologic Mental Status orientated to person, place, time and situation, Cranial Nerves cranial nerves 2 - 12 grossly intact as examined    Discharge Disposition  Home or Self Care    Discharge Medications     Discharge Medications      New Medications      Instructions Start Date   calcium carbonate 1000 MG chewable tablet  Commonly known as:  TUMS ULTRA   Oral, 3 Times Daily PRN      ondansetron ODT 4 MG disintegrating tablet  Commonly known as:  ZOFRAN-ODT   4 mg, Oral, Every 6 Hours PRN         Changes to Medications      Instructions Start Date   lisinopril 10 MG tablet  Commonly known as:  PRINIVIL,ZESTRIL  What changed:    · how much to take  · how to take this  · when to take this   TAKE ONE TABLET BY MOUTH ONCE DAILY      pravastatin 80 MG tablet  Commonly known as:  PRAVACHOL  What changed:    · how much to take  · how to take this  · when to take this   TAKE ONE TABLET BY MOUTH ONCE DAILY EVERY  NIGHT         Continue These Medications      Instructions Start Date   aspirin 81 MG EC tablet   81 mg, " Oral, Daily, Hold aspirin until Monday      bisoprolol 5 MG tablet  Commonly known as:  ZEBeta   5 mg, Oral, Daily      fluticasone 50 MCG/ACT nasal spray  Commonly known as:  FLONASE   1-2 sprays, Nasal, Daily         Stop These Medications    amoxicillin 500 MG capsule  Commonly known as:  AMOXIL     amoxicillin-clarithromycin-lansoprazole combo pack  Commonly known as:  PREVPAC     clarithromycin 500 MG tablet  Commonly known as:  BIAXIN     metroNIDAZOLE 500 MG tablet  Commonly known as:  FLAGYL        ASK your doctor about these medications      Instructions Start Date   HYDROcodone-acetaminophen 7.5-325 MG per tablet  Commonly known as:  NORCO  Ask about: Should I take this medication?   1 tablet, Oral, Every 4 Hours PRN             Discharge Diet:   Diet Instructions     Advance Diet as Tolerated            Activity at Discharge:   Activity Instructions     Discharge Activity      1) No driving while taking narcotic pain medications (i.e. lortab, vicodin, percocet, hydrocodone, oxycodone)   2) May shower.  No tub soaks, submersion in water or baths for 2 weeks.  3) Do not lift / push / pull/ tug more than 20 lbs x 4 weeks.          Follow-up Appointments  Future Appointments   Date Time Provider Department Center   5/10/2019  1:00 PM Kenneth Hinojosa MD [facility] None   5/21/2019  2:00 PM Kimberly Nolen MD [facility]     Additional Instructions for the Follow-ups that You Need to Schedule     Discharge Follow-up with Specialty: Dr. Hinojosa   As directed      Specialty:  Dr. Hinojosa    Follow Up Details:  follow up on Friday 5/10/2019 at 1 pm               Test Results Pending at Discharge: none       Esperanza Mondragon MD  05/10/19  11:44 AM    Time: Discharge 35 min

## 2019-05-11 ENCOUNTER — READMISSION MANAGEMENT (OUTPATIENT)
Dept: CALL CENTER | Facility: HOSPITAL | Age: 69
End: 2019-05-11

## 2019-05-11 NOTE — OUTREACH NOTE
General Surgery Week 1 Survey      Responses   Facility patient discharged from?  [facility]   Does the patient have one of the following disease processes/diagnoses(primary or secondary)?  General Surgery   Is there a successful TCM telephone encounter documented?  No   Week 1 attempt successful?  Yes   Call start time  1340   Call end time  1344   Discharge diagnosis  S/P laparoscopic right hemicolectomy for right colon carcinoma   Is patient permission given to speak with other caregiver?  No   Meds reviewed with patient/caregiver?  Yes   Is the patient having any side effects they believe may be caused by any medication additions or changes?  No   Does the patient have all medications related to this admission filled (includes all antibiotics, pain medications, etc.)  Yes   Is the patient taking all medications as directed (includes completed medication regime)?  Yes   Does the patient have a follow up appointment scheduled with their surgeon?  Yes   Has the patient kept scheduled appointments due by today?  Yes   Comments  05/10 saw the surgeon   Did the patient receive a copy of their discharge instructions?  Yes   Nursing interventions  Reviewed instructions with patient   What is the patient's perception of their health status since discharge?  Improving   Nursing interventions  Nurse provided patient education   Is the patient /caregiver able to teach back basic post-op care?  Continue use of incentive spirometry at least 1 week post discharge, Practice 'cough and deep breath', Keep incision areas clean,dry and protected, Lifting as instructed by MD in discharge instructions, No tub bath, swimming, or hot tub until instructed by MD, Drive as instructed by MD in discharge instructions, Take showers only when approved by MD-sponge bathe until then   Is the patient/caregiver able to teach back signs and symptoms of incisional infection?  Increased redness, swelling or pain at the incisonal site, Increased  drainage or bleeding, Incisional warmth, Pus or odor from incision, Fever   Is the patient/caregiver able to teach back steps to recovery at home?  Eat a well-balance diet   Is the patient/caregiver able to teach back the hierarchy of who to call/visit for symptoms/problems? PCP, Specialist, Home health nurse, Urgent Care, ED, 911  Yes   Week 1 call completed?  Yes          Tiffanie Sanchez RN

## 2019-05-18 ENCOUNTER — READMISSION MANAGEMENT (OUTPATIENT)
Dept: CALL CENTER | Facility: HOSPITAL | Age: 69
End: 2019-05-18

## 2019-05-18 NOTE — OUTREACH NOTE
General Surgery Week 2 Survey      Responses   Facility patient discharged from?  [facility]   Does the patient have one of the following disease processes/diagnoses(primary or secondary)?  General Surgery   Week 2 attempt successful?  Yes   Call start time  1636   Call end time  1641   Discharge diagnosis  S/P laparoscopic right hemicolectomy for right colon carcinoma   Is patient permission given to speak with other caregiver?  No   Meds reviewed with patient/caregiver?  Yes   Is the patient having any side effects they believe may be caused by any medication additions or changes?  No   Does the patient have all medications related to this admission filled (includes all antibiotics, pain medications, etc.)  Yes   Is the patient taking all medications as directed (includes completed medication regime)?  Yes   Does the patient have a follow up appointment scheduled with their surgeon?  Yes   Has the patient kept scheduled appointments due by today?  Yes   Has home health visited the patient within 72 hours of discharge?  N/A   Psychosocial issues?  No   Did the patient receive a copy of their discharge instructions?  Yes   Nursing interventions  Reviewed instructions with patient   What is the patient's perception of their health status since discharge?  Improving   Nursing interventions  Nurse provided patient education   Is the patient /caregiver able to teach back basic post-op care?  No tub bath, swimming, or hot tub until instructed by MD, Keep incision areas clean,dry and protected, Lifting as instructed by MD in discharge instructions   Is the patient/caregiver able to teach back signs and symptoms of incisional infection?  Increased redness, swelling or pain at the incisonal site, Increased drainage or bleeding, Incisional warmth, Pus or odor from incision, Fever   Is the patient/caregiver able to teach back steps to recovery at home?  Set small, achievable goals for return to baseline health, Rest and rebuild  strength, gradually increase activity, Eat a well-balance diet   Is the patient/caregiver able to teach back the hierarchy of who to call/visit for symptoms/problems? PCP, Specialist, Home health nurse, Urgent Care, ED, 911  Yes   Week 2 call completed?  Yes          Latosha Vale RN

## 2019-05-19 PROBLEM — O10.011 CHRONIC BENIGN ESSENTIAL HYPERTENSION IN FIRST TRIMESTER: Status: RESOLVED | Noted: 2019-04-09 | Resolved: 2019-05-19

## 2019-05-19 NOTE — PROGRESS NOTES
Subjective     PROBLEM LIST:  1. [id number] Colon cancer, right sided  A) right hemicolectomy on 4/29/19.  Pathology showed a moderately differentiated adenocarcinoma with mucinous features. 0/19 LN involved.  No LVI, no perineural invasion.  2. Hypertension  3. Hyperlipidemia  4. CAD        CHIEF COMPLAINT: colon cancer      HISTORY OF PRESENT ILLNESS:  The patient is a 69 y.o. year old male, referred for evaluation of a recently diagnosed colon cancer.  He presented with significant weakness and fatigue.  He was found to have both peptic ulcers as well as a right-sided colon cancer.  He underwent a right hemicolectomy about 4 weeks ago.  He says he has been improving.  He is eating and drinking and moving his bowels.  He notices that his strength and energy are improving although he does not feel back to baseline and still sometimes gets dizzy.  He reports he is scheduled for a follow-up upper endoscopy in about a month.  He wonders when he can start taking his aspirin again.    REVIEW OF SYSTEMS:  A 14 point review of systems was performed and is negative except as noted above.    Past Medical History:   Diagnosis Date   • Abdominal pain    • Arthritis    • Bruises easily    • Cancer (CMS/HCC)     skin cancer   • Coronary artery disease     1 stent   • Dyslipidemia    • GERD (gastroesophageal reflux disease)    • Helicobacter pylori infection    • History of transfusion 04/2019   • Hyperlipidemia    • Hypertension    • Peptic ulcer    • Small bowel mass    • Subsequent ST elevation (STEMI) myocardial infarction of inferior wall (CMS/HCC) 07/29/2011    99% RCA (2.75 x 23 Promus post with 3.0 Quantum), 60% OM, LVEF normal.   • Teeth missing    • Wears glasses     reading glasses only         Current Outpatient Medications on File Prior to Visit   Medication Sig Dispense Refill   • aspirin 81 MG EC tablet Take 1 tablet by mouth Daily. Hold aspirin until Monday     • bisoprolol (ZEBETA) 5 MG tablet Take 1 tablet by  mouth daily. 90 tablet 3   • calcium carbonate (TUMS ULTRA) 1000 MG chewable tablet Chew 1/2 tablet by mouth 3 (Three) Times a Day As Needed for Indigestion or Heartburn 22 tablet 0   • fluticasone (FLONASE) 50 MCG/ACT nasal spray 1-2 sprays into the nostril(s) as directed by provider Daily.     • lisinopril (PRINIVIL,ZESTRIL) 10 MG tablet TAKE ONE TABLET BY MOUTH ONCE DAILY (Patient taking differently: TAKE HALF A TABLET OF 10 DAILY, FOR A DOSE OF 5MG DAILY) 90 tablet 0   • ondansetron ODT (ZOFRAN ODT) 4 MG disintegrating tablet Take 1 tablet by mouth Every 6 (Six) Hours As Needed for Nausea or Vomiting. 25 tablet 0   • pravastatin (PRAVACHOL) 80 MG tablet TAKE ONE TABLET BY MOUTH ONCE DAILY EVERY  NIGHT (Patient taking differently: TAKE ONE TABLET BY MOUTH ONCE DAILY EVERY  NIGHT/ on hold until after surgery) 90 tablet 0     No current facility-administered medications on file prior to visit.        Allergies   Allergen Reactions   • Losartan Rash       Past Surgical History:   Procedure Laterality Date   • CARDIAC CATHETERIZATION      stent x1   • COLON RESECTION Right 4/29/2019    Procedure: COLECTOMY LAPAROSCOPIC HAND ASSISTED;  Surgeon: Kenneth Hinojosa MD;  Location: [facility] OR;  Service: General   • COLONOSCOPY N/A 4/8/2019    Procedure: COLONOSCOPY WITH COLD BIOPSY, HOT BIOPSY POLYPECTOMY;  Surgeon: Kenneth Hinojosa MD;  Location: [facility] ENDOSCOPY;  Service: General   • ENDOSCOPY N/A 4/8/2019    Procedure: ESOPHAGOGASTRODUODENOSCOPY WITH BIOPSY, QUICK CLIP PRO PLACEMENT;  Surgeon: Kenneth Hinojosa MD;  Location: [facility] ENDOSCOPY;  Service: General   • SKIN BIOPSY      left ear       Social History     Socioeconomic History   • Marital status: Single     Spouse name: Not on file   • Number of children: Not on file   • Years of education: Not on file   • Highest education level: Not on file   Tobacco Use   • Smoking status: Never Smoker   • Smokeless tobacco: Current User     Types: Snuff   Substance and  "Sexual Activity   • Alcohol use: No   • Drug use: No   • Sexual activity: Defer       Family History   Problem Relation Age of Onset   • Emphysema Father    • No Known Problems Sister        Objective     /62   Pulse 67   Temp 97.7 °F (36.5 °C) (Temporal)   Resp 17   Ht 177.8 cm (70\")   Wt 91.2 kg (201 lb)   BMI 28.84 kg/m²   Performance Status: 0  General: well appearing male in no acute distress  Neuro: alert and oriented  HEENT: sclera anicteric, oropharynx clear  Lymphatics: no cervical, supraclavicular, or axillary adenopathy  Cardiovascular: regular rate and rhythm, no murmurs  Lungs: clear to auscultation bilaterally  Abdomen: soft, nontender, nondistended.  No palpable organomegaly  Extremeties: no lower extremity edema  Skin: no rashes, lesions, bruising, or petechiae  Psych: mood and affect appropriate    Results for SAE ALLEN (MRN [id number]) as of 5/21/2019 15:18   Ref. Range 5/7/2019 05:11   Glucose Latest Ref Range: 74 - 98 mg/dL 91   Sodium Latest Ref Range: 137 - 145 mmol/L 137   Potassium Latest Ref Range: 3.5 - 5.1 mmol/L 3.7   CO2 Latest Ref Range: 26.0 - 30.0 mmol/L 22.0 (L)   Chloride Latest Ref Range: 98 - 107 mmol/L 100   Anion Gap Latest Ref Range: 10.0 - 20.0 mmol/L 18.7   Creatinine Latest Ref Range: 0.60 - 1.30 mg/dL 0.90   BUN Latest Ref Range: 7 - 20 mg/dL 11   BUN/Creatinine Ratio Latest Ref Range: 6.3 - 21.9  12.2   Calcium Latest Ref Range: 8.4 - 10.2 mg/dL 8.3 (L)   eGFR Non  Am Latest Ref Range: >60 mL/min/1.73 83   WBC Latest Ref Range: 3.40 - 10.80 10*3/mm3 18.06 (H)   RBC Latest Ref Range: 4.14 - 5.80 10*6/mm3 5.38   Hemoglobin Latest Ref Range: 13.0 - 17.7 g/dL 11.9 (L)   Hematocrit Latest Ref Range: 37.5 - 51.0 % 39.9   RDW Latest Ref Range: 12.3 - 15.4 % 23.5 (H)   MCV Latest Ref Range: 79.0 - 97.0 fL 74.2 (L)   MCH Latest Ref Range: 26.6 - 33.0 pg 22.1 (L)   MCHC Latest Ref Range: 31.5 - 35.7 g/dL 29.8 (L)   MPV Latest Ref Range: 6.0 - 12.0 fL 9.5 "   Platelets Latest Ref Range: 140 - 450 10*3/mm3 387   RDW-SD Latest Ref Range: 37.0 - 54.0 fl 61.8 (H)           Assessment/Plan     Nilay Odonnell is a 69 y.o. year old male with a stage IIA right sided colon cancer.    He does not have any high risk features and I would not recommend adjuvant chemotherapy in his case.    I will plan to see him every 6 months for the next 5 years with labs including CEA level.  He will need a follow up colonoscopy in 6-12 months.    He has microcytic anemia, but this has improved significantly over the past month.  We discussed eating a diet with iron rich foods in the short-term to help rebuild his iron stores.    I did recommend he wait until after his follow-up endoscopy before resuming the aspirin given his recent diagnosis of peptic ulcers.    Follow-up in 6 months with labs.           Kimberly Nolen MD    5/21/2019

## 2019-05-21 ENCOUNTER — CONSULT (OUTPATIENT)
Dept: ONCOLOGY | Facility: CLINIC | Age: 69
End: 2019-05-21

## 2019-05-21 VITALS
TEMPERATURE: 97.7 F | BODY MASS INDEX: 28.77 KG/M2 | HEART RATE: 67 BPM | DIASTOLIC BLOOD PRESSURE: 62 MMHG | HEIGHT: 70 IN | SYSTOLIC BLOOD PRESSURE: 109 MMHG | WEIGHT: 201 LBS | RESPIRATION RATE: 17 BRPM

## 2019-05-21 DIAGNOSIS — C18.2 MALIGNANT NEOPLASM OF ASCENDING COLON (HCC): Primary | ICD-10-CM

## 2019-05-21 PROCEDURE — 99203 OFFICE O/P NEW LOW 30 MIN: CPT | Performed by: INTERNAL MEDICINE

## 2019-05-28 ENCOUNTER — READMISSION MANAGEMENT (OUTPATIENT)
Dept: CALL CENTER | Facility: HOSPITAL | Age: 69
End: 2019-05-28

## 2019-05-28 NOTE — OUTREACH NOTE
General Surgery Week 3 Survey      Responses   Facility patient discharged from?  [facility]   Does the patient have one of the following disease processes/diagnoses(primary or secondary)?  General Surgery   Week 3 attempt successful?  Yes   Call start time  1500   Call end time  1502   Discharge diagnosis  S/P laparoscopic right hemicolectomy for right colon carcinoma   Is patient permission given to speak with other caregiver?  No   Meds reviewed with patient/caregiver?  Yes   Is the patient having any side effects they believe may be caused by any medication additions or changes?  No   Is the patient taking all medications as directed (includes completed medication regime)?  Yes   Has the patient kept scheduled appointments due by today?  Yes   Psychosocial issues?  No   Comments  Denies issues voiding. Denies pain, N/V/D and s/sx of infection.    What is the patient's perception of their health status since discharge?  Returned to baseline/stable   Is the patient/caregiver able to teach back the hierarchy of who to call/visit for symptoms/problems? PCP, Specialist, Home health nurse, Urgent Care, ED, 911  Yes   Week 3 call completed?  Yes   Revoked  No further contact(revokes)-requires comment   Graduated/Revoked comments  baseline          Amina Hoyos, RN

## 2019-10-30 ENCOUNTER — OFFICE VISIT (OUTPATIENT)
Dept: INTERNAL MEDICINE | Facility: CLINIC | Age: 69
End: 2019-10-30

## 2019-10-30 VITALS
DIASTOLIC BLOOD PRESSURE: 70 MMHG | TEMPERATURE: 97.9 F | SYSTOLIC BLOOD PRESSURE: 110 MMHG | HEART RATE: 69 BPM | OXYGEN SATURATION: 97 % | BODY MASS INDEX: 29.92 KG/M2 | WEIGHT: 209 LBS | HEIGHT: 70 IN

## 2019-10-30 DIAGNOSIS — Z13.0 SCREENING FOR ENDOCRINE, METABOLIC AND IMMUNITY DISORDER: ICD-10-CM

## 2019-10-30 DIAGNOSIS — I10 ESSENTIAL HYPERTENSION: ICD-10-CM

## 2019-10-30 DIAGNOSIS — Z13.0 SCREENING FOR DISORDER OF BLOOD AND BLOOD-FORMING ORGANS: ICD-10-CM

## 2019-10-30 DIAGNOSIS — D75.839 THROMBOCYTOSIS: ICD-10-CM

## 2019-10-30 DIAGNOSIS — E78.5 HYPERLIPIDEMIA, UNSPECIFIED HYPERLIPIDEMIA TYPE: Primary | ICD-10-CM

## 2019-10-30 DIAGNOSIS — I25.10 CORONARY ARTERY DISEASE INVOLVING NATIVE CORONARY ARTERY OF NATIVE HEART WITHOUT ANGINA PECTORIS: ICD-10-CM

## 2019-10-30 DIAGNOSIS — C18.2 MALIGNANT NEOPLASM OF ASCENDING COLON (HCC): ICD-10-CM

## 2019-10-30 DIAGNOSIS — Z13.29 SCREENING FOR ENDOCRINE, METABOLIC AND IMMUNITY DISORDER: ICD-10-CM

## 2019-10-30 DIAGNOSIS — Z13.228 SCREENING FOR ENDOCRINE, METABOLIC AND IMMUNITY DISORDER: ICD-10-CM

## 2019-10-30 DIAGNOSIS — Z11.59 ENCOUNTER FOR HEPATITIS C SCREENING TEST FOR LOW RISK PATIENT: ICD-10-CM

## 2019-10-30 PROCEDURE — 99203 OFFICE O/P NEW LOW 30 MIN: CPT | Performed by: NURSE PRACTITIONER

## 2019-10-30 RX ORDER — NITROGLYCERIN 0.4 MG/1
0.4 TABLET SUBLINGUAL
Qty: 30 TABLET | Refills: 12 | Status: SHIPPED | OUTPATIENT
Start: 2019-10-30

## 2019-10-30 RX ORDER — LISINOPRIL 10 MG/1
10 TABLET ORAL DAILY
Qty: 90 TABLET | Refills: 1 | Status: SHIPPED | OUTPATIENT
Start: 2019-10-30 | End: 2020-04-27

## 2019-10-30 RX ORDER — PRAVASTATIN SODIUM 80 MG/1
80 TABLET ORAL NIGHTLY
Qty: 90 TABLET | Refills: 1 | Status: SHIPPED | OUTPATIENT
Start: 2019-10-30 | End: 2020-04-27

## 2019-10-30 RX ORDER — BISOPROLOL FUMARATE 5 MG/1
5 TABLET, FILM COATED ORAL DAILY
Qty: 90 TABLET | Refills: 1 | Status: SHIPPED | OUTPATIENT
Start: 2019-10-30 | End: 2020-04-27

## 2019-10-30 NOTE — PROGRESS NOTES
Date: 10/30/2019    Name: Nilay Odonnell  : 1950    Chief Complaint:   Chief Complaint   Patient presents with   • Establish Care     needing med refills       HPI:  Nilay Odonnell is a 69 y.o. male presents to establish care.  He was being seen by [facility], office has closed.  He has a history of hypertension, CAD, malignant neoplasm of colon, thrombocytosis, tobacco abuse, dyslipidemia.  Is requesting refills for pravastatin, lisinopril, bisoprolol, nitroglycerin sublingual tablets.  He does not monitor blood pressure at home.  He does not eat a heart healthy diet, nor is he physically active most days.  He has not used nitroglycerin sublingual tablets in the past year, dates he just likes to have them in case they are needed.  He is fairly sure his are . He is not up to date with eye exams or dental exams/cleanings.  Denies chest pain, dyspnea, orthopnea, palpitations, lower extremity edema, headaches, weakness, visual disturbances or confusion.    History:    Past Medical History:   Diagnosis Date   • Abdominal pain    • Bruises easily    • Cancer (CMS/HCC)     skin cancer   • Coronary artery disease     1 stent   • Dyslipidemia    • Helicobacter pylori infection    • History of transfusion 2019   • Hyperlipidemia    • Hypertension    • Peptic ulcer    • Small bowel mass    • Subsequent ST elevation (STEMI) myocardial infarction of inferior wall (CMS/HCC) 2011    99% RCA (2.75 x 23 Promus post with 3.0 Quantum), 60% OM, LVEF normal.   • Teeth missing    • Wears glasses     reading glasses only       Past Surgical History:   Procedure Laterality Date   • CARDIAC CATHETERIZATION      stent x1   • COLON RESECTION Right 2019    Procedure: COLECTOMY LAPAROSCOPIC HAND ASSISTED;  Surgeon: Kenneth Hinojosa MD;  Location: [facility];  Service: General   • COLONOSCOPY N/A 2019    Procedure: COLONOSCOPY WITH COLD BIOPSY, HOT BIOPSY POLYPECTOMY;  Surgeon: Kenneth Hinojosa MD;   "Location: [facility] ENDOSCOPY;  Service: General   • ENDOSCOPY N/A 4/8/2019    Procedure: ESOPHAGOGASTRODUODENOSCOPY WITH BIOPSY, QUICK CLIP PRO PLACEMENT;  Surgeon: Kenneth Hinojosa MD;  Location: [facility] ENDOSCOPY;  Service: General   • SKIN BIOPSY      left ear       Family History   Problem Relation Age of Onset   • Parkinsonism Mother    • Emphysema Father    • No Known Problems Sister        Social History     Socioeconomic History   • Marital status: Single     Spouse name: Not on file   • Number of children: Not on file   • Years of education: Not on file   • Highest education level: Not on file   Tobacco Use   • Smoking status: Never Smoker   • Smokeless tobacco: Current User     Types: Snuff   Substance and Sexual Activity   • Alcohol use: No   • Drug use: No   • Sexual activity: Defer       Allergies   Allergen Reactions   • Losartan Rash         Current Outpatient Medications:   •  bisoprolol (ZEBeta) 5 MG tablet, Take 1 tablet by mouth Daily., Disp: 90 tablet, Rfl: 1  •  lisinopril (PRINIVIL,ZESTRIL) 10 MG tablet, Take 1 tablet by mouth Daily., Disp: 90 tablet, Rfl: 1  •  pravastatin (PRAVACHOL) 80 MG tablet, Take 1 tablet by mouth Every Night., Disp: 90 tablet, Rfl: 1  •  nitroglycerin (NITROSTAT) 0.4 MG SL tablet, Place 1 tablet under the tongue Every 5 (Five) Minutes As Needed for Chest Pain. Take no more than 3 doses in 15 minutes., Disp: 30 tablet, Rfl: 12    ROS:  Review of Systems   Constitutional: Negative for appetite change and fatigue.   Respiratory: Negative for chest tightness and wheezing.    Gastrointestinal: Negative for blood in stool, constipation, diarrhea and nausea.   Skin: Negative for pallor and rash.       VS:  Vitals:    10/30/19 1300   BP: 110/70   Pulse: 69   Temp: 97.9 °F (36.6 °C)   TempSrc: Temporal   SpO2: 97%   Weight: 94.8 kg (209 lb)   Height: 177.8 cm (70\")       PE:  Physical Exam   Constitutional: He is oriented to person, place, and time. He appears well-developed and " well-nourished. No distress.   HENT:   Head: Normocephalic.   Mouth/Throat: Oropharynx is clear and moist.   Eyes: Conjunctivae are normal. Pupils are equal, round, and reactive to light.   Neck: Normal range of motion. Neck supple.   Cardiovascular: Normal rate, regular rhythm, normal heart sounds and intact distal pulses.   Pulmonary/Chest: Effort normal and breath sounds normal.   Neurological: He is alert and oriented to person, place, and time.   Skin: Skin is warm. Capillary refill takes less than 2 seconds.   Psychiatric: He has a normal mood and affect. His behavior is normal.       Assessment/Plan:  Nilay was seen today for establish care.    Diagnoses and all orders for this visit:    Hyperlipidemia, unspecified hyperlipidemia type  -     pravastatin (PRAVACHOL) 80 MG tablet; Take 1 tablet by mouth Every Night.  -     Lipid Panel  - Heart healthy diet, exercise with goal of 30 minutes daily  Essential hypertension  -     bisoprolol (ZEBeta) 5 MG tablet; Take 1 tablet by mouth Daily.  -     lisinopril (PRINIVIL,ZESTRIL) 10 MG tablet; Take 1 tablet by mouth Daily.        - Follow heart healthy diet.  Advised to reduce daily sodium intake to < 1500 mg per day.  Avoid processed & fast foods.        - Monitor blood pressure as discussed, keep log and bring to next appointment.          - Exercise as tolerated, with a goal of 30 minutes of moderate exercise most days.         - Take medications as prescribed.  Screening for endocrine, metabolic and immunity disorder  -     Comprehensive Metabolic Panel  -     Hemoglobin A1c  Screening for disorder of blood and blood-forming organs  -     CBC & Differential  Thrombocytosis (CMS/HCC)          - CBC & Differential  Encounter for hepatitis C screening test for low risk patient  -     Hepatitis C Antibody  Malignant neoplasm of ascending colon (CMS/HCC)        - Follow up with oncology, appointment scheduled on 11/19/19  Coronary artery disease involving native  coronary artery of native heart without angina pectoris  -     nitroglycerin (NITROSTAT) 0.4 MG SL tablet; Place 1 tablet under the tongue Every 5 (Five) Minutes As Needed for Chest Pain. Take no more than 3 doses in 15 minutes.  - Advised if NTG needed, call/return to clinic if effective, or go to ED if NTG if ineffective      Return in about 2 months (around 12/30/2019) for Medicare Wellness.

## 2019-11-04 LAB
ALBUMIN SERPL-MCNC: 4.3 G/DL (ref 3.5–5.2)
ALBUMIN/GLOB SERPL: 1.7 G/DL
ALP SERPL-CCNC: 73 U/L (ref 39–117)
ALT SERPL-CCNC: 14 U/L (ref 1–41)
AST SERPL-CCNC: 15 U/L (ref 1–40)
BASOPHILS # BLD AUTO: 0.06 10*3/MM3 (ref 0–0.2)
BASOPHILS NFR BLD AUTO: 0.6 % (ref 0–1.5)
BILIRUB SERPL-MCNC: 0.4 MG/DL (ref 0.2–1.2)
BUN SERPL-MCNC: 13 MG/DL (ref 8–23)
BUN/CREAT SERPL: 11.3 (ref 7–25)
CALCIUM SERPL-MCNC: 9 MG/DL (ref 8.6–10.5)
CHLORIDE SERPL-SCNC: 104 MMOL/L (ref 98–107)
CHOLEST SERPL-MCNC: 135 MG/DL (ref 0–200)
CO2 SERPL-SCNC: 25 MMOL/L (ref 22–29)
CREAT SERPL-MCNC: 1.15 MG/DL (ref 0.76–1.27)
EOSINOPHIL # BLD AUTO: 0.26 10*3/MM3 (ref 0–0.4)
EOSINOPHIL NFR BLD AUTO: 2.8 % (ref 0.3–6.2)
ERYTHROCYTE [DISTWIDTH] IN BLOOD BY AUTOMATED COUNT: 15.8 % (ref 12.3–15.4)
GLOBULIN SER CALC-MCNC: 2.6 GM/DL
GLUCOSE SERPL-MCNC: 102 MG/DL (ref 65–99)
HBA1C MFR BLD: 6 % (ref 4.8–5.6)
HCT VFR BLD AUTO: 48.5 % (ref 37.5–51)
HDLC SERPL-MCNC: 36 MG/DL (ref 40–60)
HGB BLD-MCNC: 15.6 G/DL (ref 13–17.7)
IMM GRANULOCYTES # BLD AUTO: 0.05 10*3/MM3 (ref 0–0.05)
IMM GRANULOCYTES NFR BLD AUTO: 0.5 % (ref 0–0.5)
LDLC SERPL CALC-MCNC: 81 MG/DL (ref 0–100)
LYMPHOCYTES # BLD AUTO: 2.36 10*3/MM3 (ref 0.7–3.1)
LYMPHOCYTES NFR BLD AUTO: 25.2 % (ref 19.6–45.3)
MCH RBC QN AUTO: 27.8 PG (ref 26.6–33)
MCHC RBC AUTO-ENTMCNC: 32.2 G/DL (ref 31.5–35.7)
MCV RBC AUTO: 86.5 FL (ref 79–97)
MONOCYTES # BLD AUTO: 0.56 10*3/MM3 (ref 0.1–0.9)
MONOCYTES NFR BLD AUTO: 6 % (ref 5–12)
NEUTROPHILS # BLD AUTO: 6.08 10*3/MM3 (ref 1.7–7)
NEUTROPHILS NFR BLD AUTO: 64.9 % (ref 42.7–76)
NRBC BLD AUTO-RTO: 0 /100 WBC (ref 0–0.2)
PLATELET # BLD AUTO: 250 10*3/MM3 (ref 140–450)
POTASSIUM SERPL-SCNC: 4.6 MMOL/L (ref 3.5–5.2)
PROT SERPL-MCNC: 6.9 G/DL (ref 6–8.5)
RBC # BLD AUTO: 5.61 10*6/MM3 (ref 4.14–5.8)
SODIUM SERPL-SCNC: 141 MMOL/L (ref 136–145)
TRIGL SERPL-MCNC: 91 MG/DL (ref 0–150)
VLDLC SERPL CALC-MCNC: 18.2 MG/DL
WBC # BLD AUTO: 9.37 10*3/MM3 (ref 3.4–10.8)

## 2019-11-05 LAB — HCV AB S/CO SERPL IA: <0.1 S/CO RATIO (ref 0–0.9)

## 2019-11-19 ENCOUNTER — LAB (OUTPATIENT)
Dept: LAB | Facility: HOSPITAL | Age: 69
End: 2019-11-19

## 2019-11-19 ENCOUNTER — OFFICE VISIT (OUTPATIENT)
Dept: ONCOLOGY | Facility: CLINIC | Age: 69
End: 2019-11-19

## 2019-11-19 VITALS
WEIGHT: 203 LBS | BODY MASS INDEX: 29.06 KG/M2 | TEMPERATURE: 97.6 F | SYSTOLIC BLOOD PRESSURE: 101 MMHG | HEIGHT: 70 IN | OXYGEN SATURATION: 96 % | DIASTOLIC BLOOD PRESSURE: 65 MMHG | HEART RATE: 70 BPM | RESPIRATION RATE: 18 BRPM

## 2019-11-19 DIAGNOSIS — C18.2 MALIGNANT NEOPLASM OF ASCENDING COLON (HCC): ICD-10-CM

## 2019-11-19 DIAGNOSIS — C18.2 MALIGNANT NEOPLASM OF ASCENDING COLON (HCC): Primary | ICD-10-CM

## 2019-11-19 LAB
ALBUMIN SERPL-MCNC: 4.1 G/DL (ref 3.5–5.2)
ALBUMIN/GLOB SERPL: 1 G/DL
ALP SERPL-CCNC: 75 U/L (ref 39–117)
ALT SERPL W P-5'-P-CCNC: 28 U/L (ref 1–41)
ANION GAP SERPL CALCULATED.3IONS-SCNC: 11.7 MMOL/L (ref 5–15)
AST SERPL-CCNC: 38 U/L (ref 1–40)
BASOPHILS # BLD AUTO: 0.06 10*3/MM3 (ref 0–0.2)
BASOPHILS NFR BLD AUTO: 0.5 % (ref 0–1.5)
BILIRUB SERPL-MCNC: 0.5 MG/DL (ref 0.2–1.2)
BUN BLD-MCNC: 15 MG/DL (ref 8–23)
BUN/CREAT SERPL: 12.7 (ref 7–25)
CALCIUM SPEC-SCNC: 9.3 MG/DL (ref 8.6–10.5)
CEA SERPL-MCNC: 1.12 NG/ML
CHLORIDE SERPL-SCNC: 99 MMOL/L (ref 98–107)
CO2 SERPL-SCNC: 23.3 MMOL/L (ref 22–29)
CREAT BLD-MCNC: 1.18 MG/DL (ref 0.76–1.27)
DEPRECATED RDW RBC AUTO: 48.6 FL (ref 37–54)
EOSINOPHIL # BLD AUTO: 0.18 10*3/MM3 (ref 0–0.4)
EOSINOPHIL NFR BLD AUTO: 1.6 % (ref 0.3–6.2)
ERYTHROCYTE [DISTWIDTH] IN BLOOD BY AUTOMATED COUNT: 15 % (ref 12.3–15.4)
GFR SERPL CREATININE-BSD FRML MDRD: 61 ML/MIN/1.73
GLOBULIN UR ELPH-MCNC: 4 GM/DL
GLUCOSE BLD-MCNC: 89 MG/DL (ref 65–99)
HCT VFR BLD AUTO: 52.1 % (ref 37.5–51)
HGB BLD-MCNC: 16.9 G/DL (ref 13–17.7)
IMM GRANULOCYTES # BLD AUTO: 0.05 10*3/MM3 (ref 0–0.05)
IMM GRANULOCYTES NFR BLD AUTO: 0.4 % (ref 0–0.5)
LYMPHOCYTES # BLD AUTO: 2.1 10*3/MM3 (ref 0.7–3.1)
LYMPHOCYTES NFR BLD AUTO: 18.6 % (ref 19.6–45.3)
MCH RBC QN AUTO: 28.7 PG (ref 26.6–33)
MCHC RBC AUTO-ENTMCNC: 32.4 G/DL (ref 31.5–35.7)
MCV RBC AUTO: 88.5 FL (ref 79–97)
MONOCYTES # BLD AUTO: 0.67 10*3/MM3 (ref 0.1–0.9)
MONOCYTES NFR BLD AUTO: 5.9 % (ref 5–12)
NEUTROPHILS # BLD AUTO: 8.21 10*3/MM3 (ref 1.7–7)
NEUTROPHILS NFR BLD AUTO: 73 % (ref 42.7–76)
NRBC BLD AUTO-RTO: 0 /100 WBC (ref 0–0.2)
PLATELET # BLD AUTO: 191 10*3/MM3 (ref 140–450)
PMV BLD AUTO: 10.7 FL (ref 6–12)
POTASSIUM BLD-SCNC: 5.4 MMOL/L (ref 3.5–5.2)
PROT SERPL-MCNC: 8.1 G/DL (ref 6–8.5)
RBC # BLD AUTO: 5.89 10*6/MM3 (ref 4.14–5.8)
SODIUM BLD-SCNC: 134 MMOL/L (ref 136–145)
WBC NRBC COR # BLD: 11.27 10*3/MM3 (ref 3.4–10.8)

## 2019-11-19 PROCEDURE — 85025 COMPLETE CBC W/AUTO DIFF WBC: CPT

## 2019-11-19 PROCEDURE — 80053 COMPREHEN METABOLIC PANEL: CPT

## 2019-11-19 PROCEDURE — 82378 CARCINOEMBRYONIC ANTIGEN: CPT

## 2019-11-19 PROCEDURE — 36415 COLL VENOUS BLD VENIPUNCTURE: CPT

## 2019-11-19 PROCEDURE — 99213 OFFICE O/P EST LOW 20 MIN: CPT | Performed by: NURSE PRACTITIONER

## 2019-11-19 NOTE — PROGRESS NOTES
"      PROBLEM LIST:  1. [id number] Colon cancer, right sided  A) right hemicolectomy on 4/29/19.  Pathology showed a moderately differentiated adenocarcinoma with mucinous features. 0/19 LN involved.  No LVI, no perineural invasion.  2. Hypertension  3. Hyperlipidemia  4. CAD    Subjective     CHIEF COMPLAINT: Colon cancer    HISTORY OF PRESENT ILLNESS:   Nilay Odonnell returns for follow-up for colon cancer. He has been feeling well. He says that his appetite is good. He reports that his hgb A1C was elevated and he has lost a few pounds. Otherwise, he is doing well. His energy is good. He reports that he is feeling almost normal. Denies constipation and diarrhea.       Past Medical History, Past Surgical History, Social History, Family History have been reviewed and are without significant changes except as mentioned.    Review of Systems   A comprehensive 14 point review of systems was performed and was negative except as mentioned.    Medications:  The current medication list was reviewed in the EMR    ALLERGIES:    Allergies   Allergen Reactions   • Losartan Rash       Objective      /65 Comment: RUE  Pulse 70   Temp 97.6 °F (36.4 °C) (Temporal)   Resp 18   Ht 177.8 cm (70\")   Wt 92.1 kg (203 lb)   SpO2 96% Comment: RA  BMI 29.13 kg/m²      Performance Status: 0    General: well appearing male in no acute distress  Neuro: alert and oriented  HEENT: sclera anicteric, oropharynx clear  Lymphatics: no cervical, supraclavicular, or axillary adenopathy  Cardiovascular: regular rate and rhythm, no murmurs  Lungs: clear to auscultation bilaterally  Abdomen: soft, nontender, nondistended.  No palpable organomegaly  Extremeties: no lower extremity edema  Skin: no rashes, lesions, bruising, or petechiae  Psych: mood and affect appropriate      RECENT LABS:  Blood work was reviewed       Lab Results   Component Value Date    HGB 15.6 11/04/2019    HCT 48.5 11/04/2019    MCV 86.5 11/04/2019     " 11/04/2019    WBC 9.37 11/04/2019    NEUTROABS 6.08 11/04/2019    LYMPHSABS 2.36 11/04/2019    MONOSABS 0.56 11/04/2019    EOSABS 0.26 11/04/2019    BASOSABS 0.06 11/04/2019     Lab Results   Component Value Date    GLUCOSE 91 05/07/2019    BUN 13 11/04/2019    CREATININE 1.15 11/04/2019     11/04/2019    K 4.6 11/04/2019     11/04/2019    CO2 25.0 11/04/2019    CALCIUM 9.0 11/04/2019    PROTEINTOT 7.3 04/06/2019    ALBUMIN 4.30 11/04/2019    BILITOT 0.4 11/04/2019    ALKPHOS 73 11/04/2019    AST 15 11/04/2019    ALT 14 11/04/2019     He has not recent CEA.       Assessment/Plan   Nilay Odonnell is a 69 y.o. year old male with a stage IIA right sided colon cancer. We will plan to see him every 6 months for the next 5 years with labs including CEA level.  We will check CEA level today. We discussed CBC and CMP are stable. He will need a follow up colonoscopy in 6 months. He will plan to call Dr. Hinojosa for scheduling prior to return.      Microcytic anemia. Resolved at last lab check.       Follow-up in 6 months with labs.                I spent a total of  15 minutes in direct patient care, greater than  10   minutes (greater than 50%) were spent in coordination of care, and counseling the patient regarding colon cancer.  I answered any questions patient had with medication and plan.         Asha Blankenship APRN  [facility] Hematology [facility] Oncology    11/19/2019          CC:

## 2020-01-03 ENCOUNTER — OFFICE VISIT (OUTPATIENT)
Dept: INTERNAL MEDICINE | Facility: CLINIC | Age: 70
End: 2020-01-03

## 2020-01-03 DIAGNOSIS — Z00.00 MEDICARE ANNUAL WELLNESS VISIT, SUBSEQUENT: Primary | ICD-10-CM

## 2020-01-03 DIAGNOSIS — R35.1 NOCTURIA: ICD-10-CM

## 2020-01-03 DIAGNOSIS — Z72.0 CHEWING TOBACCO USE: ICD-10-CM

## 2020-01-03 DIAGNOSIS — R73.01 ABNORMAL FASTING GLUCOSE: ICD-10-CM

## 2020-01-03 DIAGNOSIS — I10 ESSENTIAL HYPERTENSION: ICD-10-CM

## 2020-01-03 DIAGNOSIS — C18.2 MALIGNANT NEOPLASM OF ASCENDING COLON (HCC): ICD-10-CM

## 2020-01-03 PROCEDURE — 96160 PT-FOCUSED HLTH RISK ASSMT: CPT | Performed by: NURSE PRACTITIONER

## 2020-01-03 PROCEDURE — 99397 PER PM REEVAL EST PAT 65+ YR: CPT | Performed by: NURSE PRACTITIONER

## 2020-01-03 PROCEDURE — G0439 PPPS, SUBSEQ VISIT: HCPCS | Performed by: NURSE PRACTITIONER

## 2020-01-03 NOTE — PROGRESS NOTES
The ABCs of the Annual Wellness Visit  Subsequent Medicare Wellness Visit    Chief Complaint   Patient presents with   • Medicare Wellness-subsequent       Subjective   History of Present Illness:  Nilay Odonnell is a 69 y.o. male who presents for a Subsequent Medicare Wellness Visit. He asks if there is anything he can take to help him sleep, as he is having difficulty falling asleep, wakes up easily.  Doesn't feel he gets a sound sleep, wakes up a few times in the night to urinate.  No urinary incontinence, dribbling, decrease in stream, difficulty initiating urinary stream, increase in urination during the day.      HEALTH RISK ASSESSMENT    Recent Hospitalizations:  Recently treated at the following:  [facility] for GI issues, including bleeding, diarrhea, adenocarcinoma with subsequent laparoscopic colectomy per Dr. Hinojosa.  He has followed up with Dr Hinojosa, and Dr Nolen hem/onc.      Current Medical Providers:  Patient Care Team:  India Jerome APRN as PCP - General (Family Medicine)  Michele Burris MD as PCP - Family Medicine  Kenneth Hinojosa MD as Consulting Physician (General Surgery)    Smoking Status:  Social History     Tobacco Use   Smoking Status Never Smoker   Smokeless Tobacco Current User   • Types: Snuff       Alcohol Consumption:  Social History     Substance and Sexual Activity   Alcohol Use No       Depression Screen:   PHQ-2/PHQ-9 Depression Screening 1/3/2020   Little interest or pleasure in doing things 0   Feeling down, depressed, or hopeless 0   Total Score 0       Fall Risk Screen:  PRAVIN Fall Risk Assessment was completed, and patient is at LOW risk for falls.Assessment completed on:1/3/2020    Health Habits and Functional and Cognitive Screening:  Functional & Cognitive Status 1/3/2020   Do you have difficulty preparing food and eating? Yes   Do you have difficulty bathing yourself, getting dressed or grooming yourself? No   Do you have difficulty using  the toilet? No   Do you have difficulty moving around from place to place? No   Do you have trouble with steps or getting out of a bed or a chair? No   Current Diet Well Balanced Diet   Dental Exam Not up to date   Eye Exam Not up to date   Exercise (times per week) 7 times per week   Current Exercise Activities Include Walking   Do you need help using the phone?  No   Are you deaf or do you have serious difficulty hearing?  No   Do you need help with transportation? No   Do you need help shopping? No   Do you need help preparing meals?  No   Do you need help with housework?  No   Do you need help with laundry? No   Do you need help taking your medications? No   Do you need help managing money? No   Do you ever drive or ride in a car without wearing a seat belt? No   Have you felt unusual stress, anger or loneliness in the last month? No   Who do you live with? Alone   If you need help, do you have trouble finding someone available to you? No   Have you been bothered in the last four weeks by sexual problems? No   Do you have difficulty concentrating, remembering or making decisions? No         Does the patient have evidence of cognitive impairment? No    Asprin use counseling:Contraindicated from taking ASA    Age-appropriate Screening Schedule:  Refer to the list below for future screening recommendations based on patient's age, sex and/or medical conditions. Orders for these recommended tests are listed in the plan section. The patient has been provided with a written plan.    Health Maintenance   Topic Date Due   • TDAP/TD VACCINES (1 - Tdap) 04/04/1960   • ZOSTER VACCINE (1 of 2) 04/04/1999   • LIPID PANEL  11/04/2020   • COLONOSCOPY  04/08/2029   • INFLUENZA VACCINE  Completed          The following portions of the patient's history were reviewed and updated as appropriate: allergies, current medications, past family history, past medical history, past social history, past surgical history and problem  list.    Outpatient Medications Prior to Visit   Medication Sig Dispense Refill   • bisoprolol (ZEBeta) 5 MG tablet Take 1 tablet by mouth Daily. 90 tablet 1   • lisinopril (PRINIVIL,ZESTRIL) 10 MG tablet Take 1 tablet by mouth Daily. 90 tablet 1   • nitroglycerin (NITROSTAT) 0.4 MG SL tablet Place 1 tablet under the tongue Every 5 (Five) Minutes As Needed for Chest Pain. Take no more than 3 doses in 15 minutes. 30 tablet 12   • pravastatin (PRAVACHOL) 80 MG tablet Take 1 tablet by mouth Every Night. 90 tablet 1     No facility-administered medications prior to visit.        Patient Active Problem List   Diagnosis   • Coronary artery disease involving native coronary artery of native heart   • HTN (hypertension)   • Tobacco abuse   • Dyslipidemia   • Gastrointestinal hemorrhage   • Thrombocytosis (CMS/HCC)   • Chewing tobacco use   • Acute blood loss anemia   • Nodule of colon   • Duodenal ulcer   • Malignant neoplasm of ascending colon (CMS/HCC)       Advanced Care Planning:  Patient does not have an advance directive - information provided to the patient today    Review of Systems   Constitutional: Negative.    HENT: Negative.    Eyes: Negative.    Respiratory: Negative.    Cardiovascular: Negative.    Gastrointestinal: Negative.    Endocrine: Negative.    Genitourinary: Negative.    Musculoskeletal: Positive for arthralgias. Negative for myalgias.   Skin: Negative.    Neurological: Negative.    Hematological: Does not bruise/bleed easily.   Psychiatric/Behavioral: Negative.        Compared to one year ago, the patient feels his physical health is better.  Compared to one year ago, the patient feels his mental health is better.    Reviewed chart for potential of high risk medication in the elderly: yes  Reviewed chart for potential of harmful drug interactions in the elderly:yes    Objective         Vitals:    01/03/20 0805 01/03/20 0806   BP: 109/75    Pulse: 66    Temp: 97.8 °F (36.6 °C)    TempSrc: Temporal   "  SpO2: 98%    Weight: 92.1 kg (203 lb)    Height: 177.8 cm (70\")    PainSc:  0-No pain       Body mass index is 29.13 kg/m².  Discussed the patient's BMI with him. The BMI is above average; BMI management plan is completed.    Physical Exam   Constitutional: He is oriented to person, place, and time. He appears well-developed and well-nourished. No distress.   HENT:   Head: Normocephalic.   Right Ear: Tympanic membrane, external ear and ear canal normal.   Left Ear: Tympanic membrane, external ear and ear canal normal.   Nose: Nose normal.   Mouth/Throat: Uvula is midline, oropharynx is clear and moist and mucous membranes are normal.   Eyes: Pupils are equal, round, and reactive to light. Conjunctivae and EOM are normal.   Neck: Normal range of motion. Neck supple. Carotid bruit is not present. No thyromegaly present.   Cardiovascular: Normal rate, regular rhythm, normal heart sounds and intact distal pulses.   Pulmonary/Chest: Effort normal and breath sounds normal.   Abdominal: Soft. Normal aorta and bowel sounds are normal. He exhibits no distension. There is no hepatosplenomegaly. There is no tenderness. There is no rigidity and no guarding.   Musculoskeletal: He exhibits no edema or tenderness.   Crepitus noted with left shoulder and right knee ROM   Lymphadenopathy:     He has no cervical adenopathy.   Neurological: He is alert and oriented to person, place, and time.   Skin: Skin is warm. Capillary refill takes less than 2 seconds.   Psychiatric: He has a normal mood and affect. His behavior is normal. Thought content normal.            Assessment/Plan   Medicare Risks and Personalized Health Plan  CMS Preventative Services Quick Reference  Abdominal Aortic Aneurysm Screening  Advance Directive Discussion  Cardiovascular risk  Immunizations Discussed/Encouraged (specific immunizations; adacel Tdap, Pneumococcal 23 and Shingrix ).  He declined vaccinations at this " time.  Inactivity/Sedentary  Obesity/Overweight   Prostate Cancer Screening   Tobacco Use/Dependance (use dotphrase .tobaccocessation for documentation)    The above risks/problems have been discussed with the patient.  Pertinent information has been shared with the patient in the After Visit Summary.  Follow up plans and orders are seen below in the Assessment/Plan Section.    Diagnoses and all orders for this visit:    1. Medicare annual wellness visit, subsequent (Primary)    2. Abnormal fasting glucose  -     Hemoglobin A1c   3. Nocturia        - Declined PSA at this time, states he does not have difficulty urinating and no increase in urination during daytime hours        - Consider not drinking caffeine in the afternoon, evening        - Reduce liquid consumption after 7-8 pm  4. Essential hypertension        - Follow heart healthy diet.  Advised to reduce daily sodium intake to < 1500 mg per day.  Avoid processed & fast foods.        - Monitor blood pressure as discussed, keep log and bring to next appointment.          - Exercise as tolerated, with a goal of 30 minutes of moderate exercise most days.         - Take medications as prescribed.  5. Malignant neoplasm of ascending colon (CMS/HCC)        - Follow-up with gastroenterology; monitor for changes in bowel habits, increased fatigue, fever, chills, unintended weight loss.  6. Chewing tobacco use        - Nilay Odonnell  reports that he has never smoked. His smokeless tobacco use includes snuff.. I have educated him on the risk of diseases from using tobacco products such as cancer.   I advised him to quit and he is not willing to quit at this time.  I spent 3.5 minutes counseling the patient.  7. BMI 29.0-29.9,adult        - Patient's Body mass index is 29.13 kg/m². BMI is above normal parameters. Recommendations include: exercise counseling and nutrition counseling. Declined counseling, states he will cut back on sweets and fried foods.    Follow  Up:  Return if symptoms worsen or fail to improve.     An After Visit Summary and PPPS were given to the patient.

## 2020-01-14 VITALS
HEIGHT: 70 IN | OXYGEN SATURATION: 98 % | BODY MASS INDEX: 29.06 KG/M2 | WEIGHT: 203 LBS | SYSTOLIC BLOOD PRESSURE: 109 MMHG | HEART RATE: 66 BPM | DIASTOLIC BLOOD PRESSURE: 75 MMHG | TEMPERATURE: 97.8 F

## 2020-04-27 DIAGNOSIS — I10 ESSENTIAL HYPERTENSION: ICD-10-CM

## 2020-04-27 DIAGNOSIS — E78.5 HYPERLIPIDEMIA, UNSPECIFIED HYPERLIPIDEMIA TYPE: ICD-10-CM

## 2020-04-27 RX ORDER — PRAVASTATIN SODIUM 80 MG/1
TABLET ORAL
Qty: 90 TABLET | Refills: 1 | Status: SHIPPED | OUTPATIENT
Start: 2020-04-27 | End: 2020-11-11 | Stop reason: SDUPTHER

## 2020-04-27 RX ORDER — BISOPROLOL FUMARATE 5 MG/1
TABLET, FILM COATED ORAL
Qty: 90 TABLET | Refills: 1 | Status: SHIPPED | OUTPATIENT
Start: 2020-04-27 | End: 2021-10-18 | Stop reason: SDUPTHER

## 2020-04-27 RX ORDER — LISINOPRIL 10 MG/1
TABLET ORAL
Qty: 90 TABLET | Refills: 1 | Status: SHIPPED | OUTPATIENT
Start: 2020-04-27 | End: 2020-12-15 | Stop reason: SDUPTHER

## 2020-06-05 LAB — HBA1C MFR BLD: 6.3 % (ref 4.8–5.6)

## 2020-08-11 ENCOUNTER — OFFICE VISIT (OUTPATIENT)
Dept: ONCOLOGY | Facility: CLINIC | Age: 70
End: 2020-08-11

## 2020-08-11 VITALS
TEMPERATURE: 97.1 F | WEIGHT: 200 LBS | SYSTOLIC BLOOD PRESSURE: 110 MMHG | RESPIRATION RATE: 16 BRPM | HEART RATE: 65 BPM | OXYGEN SATURATION: 99 % | DIASTOLIC BLOOD PRESSURE: 68 MMHG | BODY MASS INDEX: 28.63 KG/M2 | HEIGHT: 70 IN

## 2020-08-11 DIAGNOSIS — C18.2 MALIGNANT NEOPLASM OF ASCENDING COLON (HCC): Primary | ICD-10-CM

## 2020-08-11 PROCEDURE — 99213 OFFICE O/P EST LOW 20 MIN: CPT | Performed by: INTERNAL MEDICINE

## 2020-08-11 NOTE — PROGRESS NOTES
"      PROBLEM LIST:  1. [id number] Colon cancer, right sided  A) right hemicolectomy on 4/29/19.  Pathology showed a moderately differentiated adenocarcinoma with mucinous features. 0/19 LN involved.  No LVI, no perineural invasion.  2. Hypertension  3. Hyperlipidemia  4. CAD    Subjective     CHIEF COMPLAINT: Colon cancer    HISTORY OF PRESENT ILLNESS:   Nilay Odonnell returns for follow-up for colon cancer.   He says he has been feeling pretty well.  Denies abdominal pain, changes in bowel movements, or weight loss.  Appetite is good.  He has not seen Dr. Hinojosa since shortly after the surgery.      Past Medical History, Past Surgical History, Social History, Family History have been reviewed and are without significant changes except as mentioned.    Review of Systems   A comprehensive 14 point review of systems was performed and was negative except as mentioned.    Medications:  The current medication list was reviewed in the EMR    ALLERGIES:    Allergies   Allergen Reactions   • Losartan Rash       Objective      /68   Pulse 65   Temp 97.1 °F (36.2 °C) (Temporal)   Resp 16   Ht 177.8 cm (70\")   Wt 90.7 kg (200 lb)   SpO2 99%   BMI 28.70 kg/m²      Performance Status: 0    General: well appearing male in no acute distress  Neuro: alert and oriented  HEENT: sclera anicteric, oropharynx clear  Lymphatics: no cervical, supraclavicular, or axillary adenopathy  Cardiovascular: regular rate and rhythm, no murmurs  Lungs: clear to auscultation bilaterally  Abdomen: soft, nontender, nondistended.  No palpable organomegaly  Extremeties: no lower extremity edema  Skin: no rashes, lesions, bruising, or petechiae  Psych: mood and affect appropriate      RECENT LABS:  Blood work was reviewed       Lab Results   Component Value Date    HGB 16.9 11/19/2019    HCT 52.1 (H) 11/19/2019    MCV 88.5 11/19/2019     11/19/2019    WBC 11.27 (H) 11/19/2019    NEUTROABS 8.21 (H) 11/19/2019    LYMPHSABS 2.10 " 11/19/2019    MONOSABS 0.67 11/19/2019    EOSABS 0.18 11/19/2019    BASOSABS 0.06 11/19/2019     Lab Results   Component Value Date    GLUCOSE 89 11/19/2019    BUN 15 11/19/2019    CREATININE 1.18 11/19/2019     (L) 11/19/2019    K 5.4 (H) 11/19/2019    CL 99 11/19/2019    CO2 23.3 11/19/2019    CALCIUM 9.3 11/19/2019    PROTEINTOT 8.1 11/19/2019    ALBUMIN 4.10 11/19/2019    BILITOT 0.5 11/19/2019    ALKPHOS 75 11/19/2019    AST 38 11/19/2019    ALT 28 11/19/2019           Assessment/Plan   Nilay Odonnell is a 70 y.o. year old male with a stage IIA right sided colon cancer. I will plan on yearly CT scan until 5 years from his diagnosis, with follow up and labs every 6 months.  This was ordered today and we will contact him by phone with the results.    He is overdue for colonoscopy - will refer to Dr. Hinojosa for this.        Follow-up in 6 months with labs.                I spent a total of  15 minutes in direct patient care, greater than  10   minutes (greater than 50%) were spent in coordination of care, and counseling the patient regarding colon cancer.  I answered any questions patient had with medication and plan.         Kimberly Nolen MD  [facility]    8/11/2020          CC:

## 2020-08-17 ENCOUNTER — TELEPHONE (OUTPATIENT)
Dept: SURGERY | Facility: CLINIC | Age: 70
End: 2020-08-17

## 2020-08-21 ENCOUNTER — HOSPITAL ENCOUNTER (OUTPATIENT)
Dept: CT IMAGING | Facility: HOSPITAL | Age: 70
Discharge: HOME | End: 2020-08-21
Admitting: INTERNAL MEDICINE

## 2020-08-21 ENCOUNTER — HOSPITAL ENCOUNTER (OUTPATIENT)
Dept: CT IMAGING | Facility: HOSPITAL | Age: 70
Discharge: HOME | End: 2020-08-21

## 2020-08-21 ENCOUNTER — TELEPHONE (OUTPATIENT)
Dept: ONCOLOGY | Facility: CLINIC | Age: 70
End: 2020-08-21

## 2020-08-21 DIAGNOSIS — C18.2 MALIGNANT NEOPLASM OF ASCENDING COLON (HCC): ICD-10-CM

## 2020-08-21 PROCEDURE — 71260 CT THORAX DX C+: CPT

## 2020-08-21 PROCEDURE — 25010000002 IOPAMIDOL 61 % SOLUTION: Performed by: INTERNAL MEDICINE

## 2020-08-21 PROCEDURE — 74177 CT ABD & PELVIS W/CONTRAST: CPT

## 2020-08-21 RX ADMIN — IOPAMIDOL 100 ML: 612 INJECTION, SOLUTION INTRAVENOUS at 09:39

## 2020-08-21 NOTE — TELEPHONE ENCOUNTER
Per dr. Nolen, I called patient to let him know that his ct scans are normal and without disease.  Patient was pleased with the call.  I told him that scheduling would be contacting him with a follow up appt in 6 months.

## 2020-09-29 DIAGNOSIS — Z01.818 PREOP TESTING: Primary | ICD-10-CM

## 2020-09-29 RX ORDER — BISACODYL 5 MG
TABLET, DELAYED RELEASE (ENTERIC COATED) ORAL
Qty: 4 TABLET | Refills: 0 | Status: SHIPPED | OUTPATIENT
Start: 2020-09-29 | End: 2021-01-11

## 2020-09-29 RX ORDER — POLYETHYLENE GLYCOL 3350 17 G/17G
POWDER, FOR SOLUTION ORAL
Qty: 238 G | Refills: 0 | Status: SHIPPED | OUTPATIENT
Start: 2020-09-29 | End: 2023-01-18 | Stop reason: SDUPTHER

## 2020-09-30 ENCOUNTER — PREP FOR SURGERY (OUTPATIENT)
Dept: OTHER | Facility: HOSPITAL | Age: 70
End: 2020-09-30

## 2020-09-30 DIAGNOSIS — Z12.11 COLON CANCER SCREENING: Primary | ICD-10-CM

## 2020-10-12 PROBLEM — Z12.11 COLON CANCER SCREENING: Status: ACTIVE | Noted: 2020-10-12

## 2020-10-29 ENCOUNTER — PREP FOR SURGERY (OUTPATIENT)
Dept: OTHER | Facility: HOSPITAL | Age: 70
End: 2020-10-29

## 2020-10-29 ENCOUNTER — TELEPHONE (OUTPATIENT)
Dept: SURGERY | Facility: CLINIC | Age: 70
End: 2020-10-29

## 2020-10-29 DIAGNOSIS — Z12.11 COLON CANCER SCREENING: Primary | ICD-10-CM

## 2020-10-29 NOTE — TELEPHONE ENCOUNTER
Patient called back and states he prefers to have colonoscopy completed in [place]. Cancel new case request for [facility]

## 2020-11-11 DIAGNOSIS — E78.5 HYPERLIPIDEMIA, UNSPECIFIED HYPERLIPIDEMIA TYPE: ICD-10-CM

## 2020-11-11 RX ORDER — PRAVASTATIN SODIUM 80 MG/1
80 TABLET ORAL
Qty: 90 TABLET | Refills: 3 | Status: SHIPPED | OUTPATIENT
Start: 2020-11-11 | End: 2021-10-15

## 2020-11-11 NOTE — TELEPHONE ENCOUNTER
Caller: Nilay Odonnell    Relationship: Self    Best call back number: [phone]    Medication needed:   Requested Prescriptions     Pending Prescriptions Disp Refills   • pravastatin (PRAVACHOL) 80 MG tablet 90 tablet 1     Sig: Take 1 tablet by mouth every night at bedtime.       When do you need the refill by:ASAP    What details did the patient provide when requesting the medication:     Does the patient have less than a 3 day supply:  [x] Yes  [] No    What is the patient's preferred pharmacy: [facility]/PH[facility]ACY #[id number] - [place], [place] - [address] [phone] [facility] [phone]

## 2020-11-20 ENCOUNTER — LAB (OUTPATIENT)
Dept: LAB | Facility: HOSPITAL | Age: 70
End: 2020-11-20

## 2020-11-20 DIAGNOSIS — Z01.818 PREOP TESTING: ICD-10-CM

## 2020-11-20 LAB — SARS-COV-2 RNA RESP QL NAA+PROBE: NOT DETECTED

## 2020-11-20 PROCEDURE — C9803 HOPD COVID-19 SPEC COLLECT: HCPCS

## 2020-11-20 PROCEDURE — U0004 COV-19 TEST NON-CDC HGH THRU: HCPCS

## 2020-11-23 ENCOUNTER — ANESTHESIA EVENT (OUTPATIENT)
Dept: GASTROENTEROLOGY | Facility: HOSPITAL | Age: 70
End: 2020-11-23

## 2020-11-23 ENCOUNTER — ANESTHESIA (OUTPATIENT)
Dept: GASTROENTEROLOGY | Facility: HOSPITAL | Age: 70
End: 2020-11-23

## 2020-11-23 ENCOUNTER — HOSPITAL ENCOUNTER (OUTPATIENT)
Facility: HOSPITAL | Age: 70
Setting detail: HOSPITAL OUTPATIENT SURGERY
Discharge: HOME | End: 2020-11-23
Attending: SURGERY | Admitting: SURGERY

## 2020-11-23 VITALS
RESPIRATION RATE: 16 BRPM | TEMPERATURE: 97.9 F | OXYGEN SATURATION: 96 % | BODY MASS INDEX: 28.77 KG/M2 | WEIGHT: 205.5 LBS | HEART RATE: 82 BPM | SYSTOLIC BLOOD PRESSURE: 117 MMHG | HEIGHT: 71 IN | DIASTOLIC BLOOD PRESSURE: 90 MMHG

## 2020-11-23 DIAGNOSIS — Z12.11 COLON CANCER SCREENING: ICD-10-CM

## 2020-11-23 PROCEDURE — 88305 TISSUE EXAM BY PATHOLOGIST: CPT | Performed by: SURGERY

## 2020-11-23 PROCEDURE — 45380 COLONOSCOPY AND BIOPSY: CPT | Performed by: SURGERY

## 2020-11-23 PROCEDURE — 25010000002 MIDAZOLAM PER 1MG: Performed by: NURSE ANESTHETIST, CERTIFIED REGISTERED

## 2020-11-23 PROCEDURE — 25010000002 PROPOFOL 200 MG/20ML EMULSION: Performed by: NURSE ANESTHETIST, CERTIFIED REGISTERED

## 2020-11-23 PROCEDURE — 45384 COLONOSCOPY W/LESION REMOVAL: CPT | Performed by: SURGERY

## 2020-11-23 PROCEDURE — 25010000002 FENTANYL CITRATE (PF) 100 MCG/2ML SOLUTION: Performed by: NURSE ANESTHETIST, CERTIFIED REGISTERED

## 2020-11-23 PROCEDURE — S0260 H&P FOR SURGERY: HCPCS | Performed by: SURGERY

## 2020-11-23 RX ORDER — SODIUM CHLORIDE, SODIUM LACTATE, POTASSIUM CHLORIDE, CALCIUM CHLORIDE 600; 310; 30; 20 MG/100ML; MG/100ML; MG/100ML; MG/100ML
1000 INJECTION, SOLUTION INTRAVENOUS CONTINUOUS
Status: DISCONTINUED | OUTPATIENT
Start: 2020-11-23 | End: 2020-11-23 | Stop reason: HOSPADM

## 2020-11-23 RX ORDER — MIDAZOLAM HYDROCHLORIDE 2 MG/2ML
INJECTION, SOLUTION INTRAMUSCULAR; INTRAVENOUS AS NEEDED
Status: DISCONTINUED | OUTPATIENT
Start: 2020-11-23 | End: 2020-11-23 | Stop reason: SURG

## 2020-11-23 RX ORDER — PROPOFOL 10 MG/ML
INJECTION, EMULSION INTRAVENOUS AS NEEDED
Status: DISCONTINUED | OUTPATIENT
Start: 2020-11-23 | End: 2020-11-23 | Stop reason: SURG

## 2020-11-23 RX ORDER — FENTANYL CITRATE 50 UG/ML
INJECTION, SOLUTION INTRAMUSCULAR; INTRAVENOUS AS NEEDED
Status: DISCONTINUED | OUTPATIENT
Start: 2020-11-23 | End: 2020-11-23 | Stop reason: SURG

## 2020-11-23 RX ORDER — SODIUM CHLORIDE 0.9 % (FLUSH) 0.9 %
10 SYRINGE (ML) INJECTION AS NEEDED
Status: DISCONTINUED | OUTPATIENT
Start: 2020-11-23 | End: 2020-11-23 | Stop reason: HOSPADM

## 2020-11-23 RX ORDER — LIDOCAINE HYDROCHLORIDE 20 MG/ML
INJECTION, SOLUTION INTRAVENOUS AS NEEDED
Status: DISCONTINUED | OUTPATIENT
Start: 2020-11-23 | End: 2020-11-23 | Stop reason: SURG

## 2020-11-23 RX ORDER — ONDANSETRON 2 MG/ML
4 INJECTION INTRAMUSCULAR; INTRAVENOUS ONCE AS NEEDED
Status: DISCONTINUED | OUTPATIENT
Start: 2020-11-23 | End: 2020-11-23 | Stop reason: HOSPADM

## 2020-11-23 RX ADMIN — SODIUM CHLORIDE, POTASSIUM CHLORIDE, SODIUM LACTATE AND CALCIUM CHLORIDE 1000 ML: 600; 310; 30; 20 INJECTION, SOLUTION INTRAVENOUS at 07:36

## 2020-11-23 RX ADMIN — PROPOFOL 50 MG: 10 INJECTION, EMULSION INTRAVENOUS at 08:40

## 2020-11-23 RX ADMIN — MIDAZOLAM HYDROCHLORIDE 2 MG: 1 INJECTION, SOLUTION INTRAMUSCULAR; INTRAVENOUS at 08:40

## 2020-11-23 RX ADMIN — FENTANYL CITRATE 100 MCG: 50 INJECTION, SOLUTION INTRAMUSCULAR; INTRAVENOUS at 08:40

## 2020-11-23 RX ADMIN — LIDOCAINE HYDROCHLORIDE 100 MG: 20 INJECTION, SOLUTION INTRAVENOUS at 08:40

## 2020-11-23 NOTE — ANESTHESIA POSTPROCEDURE EVALUATION
Patient: Nilay Odonnell    Procedure Summary     Date: 11/23/20 Room / [facility]: [facility] END[facility]COPY [facility]    Anesthesia Start: 0838 Anesthesia Stop: 0858    Procedure: COLONOSCOPY with biopsy and hot biopsy polypectomy (N/A ) Diagnosis:       Colon cancer screening      (Colon cancer screening [Z12.11])    Surgeon: Kenneth Hinojosa MD Provider: Abdifatah Schmitz CRNA    Anesthesia Type: MAC ASA Status: 3          Anesthesia Type: MAC    Vitals  Vitals Value Taken Time   /90 11/23/20 0924   Temp 97.9 °F (36.6 °C) 11/23/20 0854   Pulse 82 11/23/20 0924   Resp 16 11/23/20 0924   SpO2 96 % 11/23/20 0924           Anesthesia Post Evaluation

## 2020-11-23 NOTE — H&P
[facility]   HISTORY AND PHYSICAL      Name:  Nilay Odonnell   Age:  70 y.o.  Sex:  male  :  1950  MRN:  [id number]   Visit Number:  [id number]  Admission Date:  2020  Date Of Service:  20  Primary Care Physician:  India Jerome APRN    Chief Complaint:     History of colectomy    History Of Presenting Illness:      Patient here for colonoscopy, last colonoscopy over 2 years ago.  Patient had previous colectomy recently.    Review Of Systems:     General ROS: Patient denies any fevers, chills or loss of consciousness.  No complaints of generalized weakness  Psychological ROS: Denies any hallucinations and delusions.  Ophthalmic ROS: no transient loss of vision.  ENT ROS: Denies sore throat, nasal congestion or ear pain.   Allergy and Immunology ROS: Denies rash or itching.  Hematological and Lymphatic ROS: Denies neck swelling or easy bleeding.  Endocrine ROS: Denies any recent unintentional weight gain or loss.  Breast ROS: Denies any pain or swelling.  Respiratory ROS: Denies cough or shortness of breath.   Cardiovascular ROS: Denies chest pain or palpitations. No history of exertional chest pain.   Gastrointestinal ROS: Denies nausea and vomiting. Denies any abdominal pain. No diarrhea.   Genito-Urinary ROS: Denies dysuria or hematuria.  Musculoskeletal ROS: no back pain. No muscle pain. No calf pain.   Neurological ROS: Denies any focal weakness. No loss of consciousness. Denies any numbness.   Dermatological ROS: Denies any redness or pruritis.     Past Medical History:    Past Medical History:   Diagnosis Date   • Abdominal pain    • Bruises easily    • Cancer (CMS/HCC)     skin cancer   • Coronary artery disease     1 stent   • Dyslipidemia    • Helicobacter pylori infection    • History of transfusion 2019   • Hyperlipidemia    • Hypertension    • Peptic ulcer    • Small bowel mass    • Subsequent ST elevation (STEMI) myocardial infarction of  inferior wall (CMS/[facility]) 07/29/2011    99% RCA (2.75 x 23 Promus post with 3.0 Quantum), 60% OM, LVEF normal.   • Teeth missing    • Wears glasses     reading glasses only       Past Surgical history:    Past Surgical History:   Procedure Laterality Date   • CARDIAC CATHETERIZATION      stent x1   • COLON RESECTION Right 4/29/2019    Procedure: COLECTOMY LAPAROSCOPIC HAND ASSISTED;  Surgeon: Kenneth Hinojosa MD;  Location: [facility] OR;  Service: General   • COLONOSCOPY N/A 4/8/2019    Procedure: COLONOSCOPY WITH COLD BIOPSY, HOT BIOPSY POLYPECTOMY;  Surgeon: Kenneth Hinojosa MD;  Location: [facility] ENDOSCOPY;  Service: General   • ENDOSCOPY N/A 4/8/2019    Procedure: ESOPHAGOGASTRODUODENOSCOPY WITH BIOPSY, QUICK CLIP PRO PLACEMENT;  Surgeon: Kenneth Hinojosa MD;  Location: [facility] ENDOSCOPY;  Service: General   • SKIN BIOPSY      left ear       Social History:    Social History     Socioeconomic History   • Marital status: Single     Spouse name: Not on file   • Number of children: Not on file   • Years of education: Not on file   • Highest education level: Not on file   Tobacco Use   • Smoking status: Never Smoker   • Smokeless tobacco: Current User     Types: Snuff   Substance and Sexual Activity   • Alcohol use: No   • Drug use: No   • Sexual activity: Defer       Family History:    Family History   Problem Relation Age of Onset   • Parkinsonism Mother    • Emphysema Father    • No Known Problems Sister        Allergies:      Losartan    Home Medications:    Prior to Admission Medications     Prescriptions Last Dose Informant Patient Reported? Taking?    bisacodyl (bisacodyl) 5 MG EC tablet 11/22/2020  No Yes    Take 2 at 3pm and 2 at 7pm the day prior to colonoscopy.    bisoprolol (ZEBeta) 5 MG tablet 11/22/2020  No Yes    TAKE 1 TABLET BY MOUTH EVERY DAY    lisinopril (PRINIVIL,ZESTRIL) 10 MG tablet 11/22/2020  No Yes    TAKE 1 TABLET BY MOUTH EVERY DAY    nitroglycerin (NITROSTAT) 0.4 MG SL tablet Unknown  No No     Place 1 tablet under the tongue Every 5 (Five) Minutes As Needed for Chest Pain. Take no more than 3 doses in 15 minutes.    polyethylene glycol (MIRALAX) 17 GM/SCOOP powder 11/22/2020  No Yes    Mix 238g powder with 64 oz of clear liquid. Starting at 5pm drink 80z every 10-15 minutes until consumed.    pravastatin (PRAVACHOL) 80 MG tablet Past Week  No Yes    Take 1 tablet by mouth every night at bedtime.             ED Medications:    Medications   lactated ringers infusion 1,000 mL (1,000 mL Intravenous New Bag 11/23/20 0736)       Vital Signs:    Temp:  [97.8 °F (36.6 °C)] 97.8 °F (36.6 °C)  Heart Rate:  [85] 85  Resp:  [16] 16  BP: (137)/(77) 137/77        11/23/20  0713   Weight: 93.2 kg (205 lb 8 oz)       Body mass index is 28.66 kg/m².    Physical Exam:      General Appearance:  Alert and cooperative, not in any acute distress.   Head:  Atraumatic and normocephalic, without obvious abnormality.   Eyes:          PERRLA, conjunctivae and sclerae normal, no Icterus. No pallor. Extraocular movements are within normal limits.   Ears:  Ears appear intact with no abnormalities noted.   Throat: No oral lesions, no thrush, oral mucosa moist.   Neck: Supple, trachea midline, no thyromegaly, no carotid bruit.   Back:   No kyphoscoliosis present. No tenderness to palpation,   range of motion normal.   Respiratory/Lungs:   Breath sounds heard bilaterally equally.  No crackles or wheezing. No Pleural rub or bronchial breathing. Normal respiratory effort.    Cardiovascular/Heart:  Normal S1 and S2, no murmur. No edema   GI/Abdomen:   No masses, no hepatosplenomegaly. Soft, non-tender, non-distended, no hernia                 Musculoskeletal/ Extremities:   Moves all extremities well   Pulses: Pulses palpable and equal bilaterally   Skin: No bleeding, bruising or rash, no induration   Lymph nodes: No palpable adenopathy   Psychiatric : Alert and oriented ×3.  No depression or anxiety    Neurologic: Cranial nerves 2 - 12  grossly intact, sensation intact, Motor power is normal and equal bilaterally.       EKG:      None    Labs:    Lab Results (last 24 hours)     ** No results found for the last 24 hours. **          Radiology:    Imaging Results (Last 72 Hours)     ** No results found for the last 72 hours. **          Assessment:    Colon cancer surveillance.  Asymptomatic.    Plan:     Colonoscopy today, risk of bleeding perforation discussed and patient agreeable    Kenneth Hinojosa MD  11/23/20  08:30 EST

## 2020-11-23 NOTE — ANESTHESIA PREPROCEDURE EVALUATION
Anesthesia Evaluation     Patient summary reviewed and Nursing notes reviewed   NPO Solid Status: > 8 hours  NPO Liquid Status: > 8 hours           Airway   Mallampati: II  TM distance: >3 FB  Neck ROM: full  No difficulty expected  Dental - normal exam   (+) poor dentition    Pulmonary - negative pulmonary ROS and normal exam   Cardiovascular - normal exam  Exercise tolerance: good (4-7 METS)    Patient on routine beta blocker and Beta blocker given within 24 hours of surgery    (+) hypertension well controlled 2 medications or greater, past MI  >12 months, CAD, cardiac stents more than 12 months ago hyperlipidemia,       Neuro/Psych- negative ROS  GI/Hepatic/Renal/Endo    (+)  GERD,      Musculoskeletal (-) negative ROS    Abdominal  - normal exam    Bowel sounds: normal.   Substance History - negative use     OB/GYN negative ob/gyn ROS         Other      history of cancer    ROS/Med Hx Other: K 5.4                Anesthesia Plan    ASA 3     MAC     intravenous induction     Anesthetic plan, all risks, benefits, and alternatives have been provided, discussed and informed consent has been obtained with: patient.    Plan discussed with attending.

## 2020-11-25 LAB
LAB AP CASE REPORT: NORMAL
PATH REPORT.FINAL DX SPEC: NORMAL

## 2020-12-15 DIAGNOSIS — I10 ESSENTIAL HYPERTENSION: ICD-10-CM

## 2020-12-15 RX ORDER — LISINOPRIL 10 MG/1
10 TABLET ORAL DAILY
Qty: 90 TABLET | Refills: 0 | Status: SHIPPED | OUTPATIENT
Start: 2020-12-15 | End: 2021-03-11

## 2020-12-15 NOTE — TELEPHONE ENCOUNTER
Caller: Nilay Odonnell    Relationship: Self    Best call back number: [phone]    Medication needed:   Requested Prescriptions     Pending Prescriptions Disp Refills   • lisinopril (PRINIVIL,ZESTRIL) 10 MG tablet 90 tablet 1     Sig: Take 1 tablet by mouth Daily.       When do you need the refill by: ASAP    What details did the patient provide when requesting the medication: PATIENT HAS 4 PILLS LEFT    Does the patient have less than a 3 day supply:  [] Yes  [x] No    What is the patient's preferred pharmacy: [facility]/PHARMACY #[id number] [address] [phone] [facility] [phone]

## 2021-01-11 ENCOUNTER — OFFICE VISIT (OUTPATIENT)
Dept: INTERNAL MEDICINE | Facility: CLINIC | Age: 71
End: 2021-01-11

## 2021-01-11 VITALS
TEMPERATURE: 97.5 F | BODY MASS INDEX: 30.03 KG/M2 | DIASTOLIC BLOOD PRESSURE: 68 MMHG | HEART RATE: 64 BPM | SYSTOLIC BLOOD PRESSURE: 110 MMHG | WEIGHT: 214.5 LBS | OXYGEN SATURATION: 99 % | HEIGHT: 71 IN

## 2021-01-11 DIAGNOSIS — R73.9 HYPERGLYCEMIA: ICD-10-CM

## 2021-01-11 DIAGNOSIS — Z00.00 MEDICARE ANNUAL WELLNESS VISIT, SUBSEQUENT: Primary | ICD-10-CM

## 2021-01-11 DIAGNOSIS — H61.23 EXCESSIVE CERUMEN IN BOTH EAR CANALS: ICD-10-CM

## 2021-01-11 DIAGNOSIS — Z13.29 SCREENING FOR ENDOCRINE, METABOLIC AND IMMUNITY DISORDER: ICD-10-CM

## 2021-01-11 DIAGNOSIS — I25.10 CORONARY ARTERY DISEASE INVOLVING NATIVE CORONARY ARTERY OF NATIVE HEART WITHOUT ANGINA PECTORIS: ICD-10-CM

## 2021-01-11 DIAGNOSIS — Z13.0 SCREENING FOR ENDOCRINE, METABOLIC AND IMMUNITY DISORDER: ICD-10-CM

## 2021-01-11 DIAGNOSIS — Z90.49 STATUS POST COLECTOMY: ICD-10-CM

## 2021-01-11 DIAGNOSIS — Z13.228 SCREENING FOR ENDOCRINE, METABOLIC AND IMMUNITY DISORDER: ICD-10-CM

## 2021-01-11 DIAGNOSIS — E78.5 DYSLIPIDEMIA: ICD-10-CM

## 2021-01-11 DIAGNOSIS — I10 ESSENTIAL HYPERTENSION: ICD-10-CM

## 2021-01-11 PROCEDURE — 1125F AMNT PAIN NOTED PAIN PRSNT: CPT | Performed by: NURSE PRACTITIONER

## 2021-01-11 PROCEDURE — G0439 PPPS, SUBSEQ VISIT: HCPCS | Performed by: NURSE PRACTITIONER

## 2021-01-11 PROCEDURE — 1170F FXNL STATUS ASSESSED: CPT | Performed by: NURSE PRACTITIONER

## 2021-01-11 PROCEDURE — 96160 PT-FOCUSED HLTH RISK ASSMT: CPT | Performed by: NURSE PRACTITIONER

## 2021-01-11 NOTE — PROGRESS NOTES
The ABCs of the Annual Wellness Visit  Subsequent Medicare Wellness Visit    Chief Complaint   Patient presents with   • Medicare Wellness-subsequent       Subjective   History of Present Illness:  Nilay Odonnell is a 70 y.o. male who presents for a Subsequent Medicare Wellness Visit.      HEALTH RISK ASSESSMENT    Recent Hospitalizations:  No hospitalization(s) within the last year.    Current Medical Providers:  Patient Care Team:  India Jerome APRN as PCP - General (Family Medicine)  Michele Burris MD as PCP - Family Medicine  Kenneth Hinojosa MD as Consulting Physician (General Surgery)    Smoking Status:  Social History     Tobacco Use   Smoking Status Never Smoker   Smokeless Tobacco Former User   • Types: Snuff       Alcohol Consumption:  Social History     Substance and Sexual Activity   Alcohol Use No       Depression Screen:   PHQ-2/PHQ-9 Depression Screening 1/11/2021   Little interest or pleasure in doing things 0   Feeling down, depressed, or hopeless 0   Total Score 0       Fall Risk Screen:  PRAVIN Fall Risk Assessment was completed, and patient is at LOW risk for falls.Assessment completed on:1/11/2021    Health Habits and Functional and Cognitive Screening:  Functional & Cognitive Status 1/11/2021   Do you have difficulty preparing food and eating? No   Do you have difficulty bathing yourself, getting dressed or grooming yourself? No   Do you have difficulty using the toilet? No   Do you have difficulty moving around from place to place? No   Do you have trouble with steps or getting out of a bed or a chair? No   Current Diet Well Balanced Diet   Dental Exam Not up to date   Eye Exam Not up to date   Exercise (times per week) 5 times per week   Current Exercise Activities Include Walking   Do you need help using the phone?  No   Are you deaf or do you have serious difficulty hearing?  No   Do you need help with transportation? No   Do you need help shopping? No   Do you need help  preparing meals?  No   Do you need help with housework?  No   Do you need help with laundry? No   Do you need help taking your medications? No   Do you need help managing money? No   Do you ever drive or ride in a car without wearing a seat belt? Yes   Have you felt unusual stress, anger or loneliness in the last month? No   Who do you live with? Alone   If you need help, do you have trouble finding someone available to you? No   Have you been bothered in the last four weeks by sexual problems? No   Do you have difficulty concentrating, remembering or making decisions? No         Does the patient have evidence of cognitive impairment? No    Asprin use counseling:Start ASA 81 mg daily     Age-appropriate Screening Schedule:  Refer to the list below for future screening recommendations based on patient's age, sex and/or medical conditions. Orders for these recommended tests are listed in the plan section. The patient has been provided with a written plan.    Health Maintenance   Topic Date Due   • TDAP/TD VACCINES (1 - Tdap) 01/16/2021 (Originally 4/4/1969)   • INFLUENZA VACCINE  01/11/2022 (Originally 8/1/2020)   • ZOSTER VACCINE (1 of 2) 01/16/2022 (Originally 4/4/1999)   • LIPID PANEL  01/11/2022   • COLONOSCOPY  11/23/2030          The following portions of the patient's history were reviewed and updated as appropriate: allergies, current medications, past family history, past medical history, past social history, past surgical history and problem list.    Outpatient Medications Prior to Visit   Medication Sig Dispense Refill   • bisoprolol (ZEBeta) 5 MG tablet TAKE 1 TABLET BY MOUTH EVERY DAY 90 tablet 1   • lisinopril (PRINIVIL,ZESTRIL) 10 MG tablet Take 1 tablet by mouth Daily. 90 tablet 0   • nitroglycerin (NITROSTAT) 0.4 MG SL tablet Place 1 tablet under the tongue Every 5 (Five) Minutes As Needed for Chest Pain. Take no more than 3 doses in 15 minutes. 30 tablet 12   • polyethylene glycol (MIRALAX) 17  "GM/SCOOP powder Mix 238g powder with 64 oz of clear liquid. Starting at 5pm drink 80z every 10-15 minutes until consumed. 238 g 0   • pravastatin (PRAVACHOL) 80 MG tablet Take 1 tablet by mouth every night at bedtime. 90 tablet 3   • bisacodyl (bisacodyl) 5 MG EC tablet Take 2 at 3pm and 2 at 7pm the day prior to colonoscopy. 4 tablet 0     No facility-administered medications prior to visit.        Patient Active Problem List   Diagnosis   • Coronary artery disease involving native coronary artery of native heart   • HTN (hypertension)   • Tobacco abuse   • Dyslipidemia   • Gastrointestinal hemorrhage   • Thrombocytosis (CMS/HCC)   • Chewing tobacco use   • Acute blood loss anemia   • Nodule of colon   • Duodenal ulcer   • Colon cancer screening       Advanced Care Planning:  ACP discussion was held with the patient during this visit. Patient does not have an advance directive, declines further assistance.    Compared to one year ago, the patient feels his physical health is better.  Compared to one year ago, the patient feels his mental health is the same.    Reviewed chart for potential of high risk medication in the elderly: yes  Reviewed chart for potential of harmful drug interactions in the elderly:yes    Review of Systems   All other systems reviewed and are negative.  Patient has no complaints    Objective         Vitals:    01/11/21 1307   BP: 110/68   Pulse: 64   Temp: 97.5 °F (36.4 °C)   TempSrc: Infrared   SpO2: 99%   Weight: 97.3 kg (214 lb 8 oz)   Height: 180.3 cm (71\")   PainSc: 0-No pain       Body mass index is 29.92 kg/m².  Discussed the patient's BMI with him. The BMI is above average; BMI management plan is completed.    Physical Exam  Constitutional:       Appearance: He is well-developed. He is not ill-appearing.   HENT:      Head: Normocephalic.      Right Ear: Tympanic membrane, ear canal and external ear normal.      Left Ear: Tympanic membrane, ear canal and external ear normal.      Ears: "      Comments: Excessive cerumen bilateral ears, able to sweep aside with speculum to visualize TM's     Nose: Nose normal.      Mouth/Throat:      Mouth: Mucous membranes are moist.      Pharynx: Oropharynx is clear. Uvula midline.   Eyes:      Extraocular Movements: Extraocular movements intact.      Conjunctiva/sclera: Conjunctivae normal.      Pupils: Pupils are equal, round, and reactive to light.   Neck:      Musculoskeletal: Full passive range of motion without pain, normal range of motion and neck supple.      Thyroid: No thyromegaly.      Vascular: No carotid bruit.   Cardiovascular:      Rate and Rhythm: Normal rate and regular rhythm.      Pulses: Normal pulses.      Heart sounds: Normal heart sounds.   Pulmonary:      Effort: Pulmonary effort is normal.      Breath sounds: Normal breath sounds.   Abdominal:      General: Bowel sounds are normal.      Palpations: Abdomen is soft.      Tenderness: There is no abdominal tenderness.   Musculoskeletal: Normal range of motion.         General: No tenderness or deformity.   Lymphadenopathy:      Cervical: No cervical adenopathy.   Skin:     General: Skin is warm.      Capillary Refill: Capillary refill takes less than 2 seconds.   Neurological:      Mental Status: He is alert and oriented to person, place, and time.      Sensory: No sensory deficit.      Coordination: Coordination normal.      Gait: Gait normal.      Comments: CN II-XII normal   Psychiatric:         Attention and Perception: Attention normal.         Mood and Affect: Mood and affect normal.         Speech: Speech normal.         Behavior: Behavior normal.         Thought Content: Thought content normal.         Lab Results   Component Value Date    GLU 82 01/11/2021    CHLPL 128 01/11/2021    TRIG 82 01/11/2021    HDL 39 (L) 01/11/2021    LDL 73 01/11/2021    VLDL 16 01/11/2021    HGBA1C 5.90 (H) 01/11/2021        Assessment/Plan   Medicare Risks and Personalized Health Plan  CMS Preventative  Services Quick Reference  Advance Directive Discussion  Cardiovascular risk  Hearing Problem  Immunizations Discussed/Encouraged (specific immunizations; none )  Inadequate Social Support, Isolation, Loneliness, Lack of Transportation, Financial Difficulties, or Caregiver Stress   Inactivity/Sedentary  Lung Cancer Risk  Obesity/Overweight   Prostate Cancer Screening   Tobacco Use/Dependance    The above risks/problems have been discussed with the patient.  Pertinent information has been shared with the patient in the After Visit Summary.  Follow up plans and orders are seen below in the Assessment/Plan Section.    Diagnoses and all orders for this visit:    1. Medicare annual wellness visit, subsequent (Primary)    2. Excessive cerumen in both ear canals  -     carbamide peroxide (Debrox) 6.5 % otic solution; Administer 5 drops into both ears As Needed for Ear Pain.  Dispense: 22 mL; Refill: 2    3. Coronary artery disease involving native coronary artery of native heart without angina pectoris    4. Essential hypertension  -     Comprehensive Metabolic Panel        - Follow heart healthy diet.  Advised to reduce daily sodium intake to < 1500 mg per day.  Avoid processed & fast foods.        - Monitor blood pressure as discussed, keep log and bring to next appointment.          - Exercise as tolerated, with a goal of 30 minutes of moderate exercise most days.         - Take medications as prescribed.    5. Dyslipidemia  -     Lipid Panel    6. Status post colectomy        - Follow up with oncology, gastroenterology as scheduled    7. Hyperglycemia  -     Hemoglobin A1c    8. Screening for disorder of blood and blood-forming organs    9. Screening for endocrine, metabolic and immunity disorder  -     Comprehensive Metabolic Panel        Follow Up:  Return in about 1 year (around 1/11/2022) for Medicare Wellness.     An After Visit Summary and PPPS were given to the patient.

## 2021-01-12 LAB
ALBUMIN SERPL-MCNC: 4.5 G/DL (ref 3.5–5.2)
ALBUMIN/GLOB SERPL: 1.9 G/DL
ALP SERPL-CCNC: 63 U/L (ref 39–117)
ALT SERPL-CCNC: 18 U/L (ref 1–41)
AST SERPL-CCNC: 19 U/L (ref 1–40)
BILIRUB SERPL-MCNC: 0.5 MG/DL (ref 0–1.2)
BUN SERPL-MCNC: 12 MG/DL (ref 8–23)
BUN/CREAT SERPL: 12.6 (ref 7–25)
CALCIUM SERPL-MCNC: 9 MG/DL (ref 8.6–10.5)
CHLORIDE SERPL-SCNC: 103 MMOL/L (ref 98–107)
CHOLEST SERPL-MCNC: 128 MG/DL (ref 0–200)
CO2 SERPL-SCNC: 26.7 MMOL/L (ref 22–29)
CREAT SERPL-MCNC: 0.95 MG/DL (ref 0.76–1.27)
GLOBULIN SER CALC-MCNC: 2.4 GM/DL
GLUCOSE SERPL-MCNC: 82 MG/DL (ref 65–99)
HBA1C MFR BLD: 5.9 % (ref 4.8–5.6)
HDLC SERPL-MCNC: 39 MG/DL (ref 40–60)
LDLC SERPL CALC-MCNC: 73 MG/DL (ref 0–100)
POTASSIUM SERPL-SCNC: 4.2 MMOL/L (ref 3.5–5.2)
PROT SERPL-MCNC: 6.9 G/DL (ref 6–8.5)
SODIUM SERPL-SCNC: 139 MMOL/L (ref 136–145)
TRIGL SERPL-MCNC: 82 MG/DL (ref 0–150)
VLDLC SERPL CALC-MCNC: 16 MG/DL (ref 5–40)

## 2021-01-16 PROBLEM — C18.2 MALIGNANT NEOPLASM OF ASCENDING COLON (HCC): Status: RESOLVED | Noted: 2019-04-15 | Resolved: 2021-01-16

## 2021-02-23 ENCOUNTER — LAB (OUTPATIENT)
Dept: LAB | Facility: HOSPITAL | Age: 71
End: 2021-02-23

## 2021-02-23 ENCOUNTER — OFFICE VISIT (OUTPATIENT)
Dept: ONCOLOGY | Facility: CLINIC | Age: 71
End: 2021-02-23

## 2021-02-23 VITALS
OXYGEN SATURATION: 98 % | DIASTOLIC BLOOD PRESSURE: 62 MMHG | HEIGHT: 71 IN | HEART RATE: 64 BPM | WEIGHT: 216 LBS | SYSTOLIC BLOOD PRESSURE: 119 MMHG | TEMPERATURE: 97.3 F | RESPIRATION RATE: 16 BRPM | BODY MASS INDEX: 30.24 KG/M2

## 2021-02-23 DIAGNOSIS — D75.839 THROMBOCYTOSIS: ICD-10-CM

## 2021-02-23 DIAGNOSIS — C18.2 MALIGNANT NEOPLASM OF ASCENDING COLON (HCC): Primary | ICD-10-CM

## 2021-02-23 DIAGNOSIS — C18.2 MALIGNANT NEOPLASM OF ASCENDING COLON (HCC): ICD-10-CM

## 2021-02-23 LAB
ALBUMIN SERPL-MCNC: 4.3 G/DL (ref 3.5–5.2)
ALBUMIN/GLOB SERPL: 1.5 G/DL
ALP SERPL-CCNC: 72 U/L (ref 39–117)
ALT SERPL W P-5'-P-CCNC: 35 U/L (ref 1–41)
ANION GAP SERPL CALCULATED.3IONS-SCNC: 12.1 MMOL/L (ref 5–15)
AST SERPL-CCNC: 30 U/L (ref 1–40)
BASOPHILS # BLD AUTO: 0.1 10*3/MM3 (ref 0–0.2)
BASOPHILS NFR BLD AUTO: 0.9 % (ref 0–1.5)
BILIRUB SERPL-MCNC: 0.6 MG/DL (ref 0–1.2)
BUN SERPL-MCNC: 11 MG/DL (ref 8–23)
BUN/CREAT SERPL: 10.4 (ref 7–25)
CALCIUM SPEC-SCNC: 9 MG/DL (ref 8.6–10.5)
CHLORIDE SERPL-SCNC: 105 MMOL/L (ref 98–107)
CO2 SERPL-SCNC: 25.9 MMOL/L (ref 22–29)
CREAT SERPL-MCNC: 1.06 MG/DL (ref 0.76–1.27)
DEPRECATED RDW RBC AUTO: 43 FL (ref 37–54)
EOSINOPHIL # BLD AUTO: 0.37 10*3/MM3 (ref 0–0.4)
EOSINOPHIL NFR BLD AUTO: 3.3 % (ref 0.3–6.2)
ERYTHROCYTE [DISTWIDTH] IN BLOOD BY AUTOMATED COUNT: 12.4 % (ref 12.3–15.4)
GFR SERPL CREATININE-BSD FRML MDRD: 69 ML/MIN/1.73
GLOBULIN UR ELPH-MCNC: 2.9 GM/DL
GLUCOSE SERPL-MCNC: 83 MG/DL (ref 65–99)
HCT VFR BLD AUTO: 52.6 % (ref 37.5–51)
HGB BLD-MCNC: 17.4 G/DL (ref 13–17.7)
IMM GRANULOCYTES # BLD AUTO: 0.1 10*3/MM3 (ref 0–0.05)
IMM GRANULOCYTES NFR BLD AUTO: 0.9 % (ref 0–0.5)
LYMPHOCYTES # BLD AUTO: 2.11 10*3/MM3 (ref 0.7–3.1)
LYMPHOCYTES NFR BLD AUTO: 18.7 % (ref 19.6–45.3)
MCH RBC QN AUTO: 31.1 PG (ref 26.6–33)
MCHC RBC AUTO-ENTMCNC: 33.1 G/DL (ref 31.5–35.7)
MCV RBC AUTO: 94.1 FL (ref 79–97)
MONOCYTES # BLD AUTO: 0.63 10*3/MM3 (ref 0.1–0.9)
MONOCYTES NFR BLD AUTO: 5.6 % (ref 5–12)
NEUTROPHILS NFR BLD AUTO: 7.98 10*3/MM3 (ref 1.7–7)
NEUTROPHILS NFR BLD AUTO: 70.6 % (ref 42.7–76)
NRBC BLD AUTO-RTO: 0 /100 WBC (ref 0–0.2)
PLATELET # BLD AUTO: 218 10*3/MM3 (ref 140–450)
PMV BLD AUTO: 9.5 FL (ref 6–12)
POTASSIUM SERPL-SCNC: 4.5 MMOL/L (ref 3.5–5.2)
PROT SERPL-MCNC: 7.2 G/DL (ref 6–8.5)
RBC # BLD AUTO: 5.59 10*6/MM3 (ref 4.14–5.8)
SODIUM SERPL-SCNC: 143 MMOL/L (ref 136–145)
WBC # BLD AUTO: 11.29 10*3/MM3 (ref 3.4–10.8)

## 2021-02-23 PROCEDURE — 80053 COMPREHEN METABOLIC PANEL: CPT

## 2021-02-23 PROCEDURE — 99214 OFFICE O/P EST MOD 30 MIN: CPT | Performed by: NURSE PRACTITIONER

## 2021-02-23 PROCEDURE — 82378 CARCINOEMBRYONIC ANTIGEN: CPT

## 2021-02-23 PROCEDURE — 85025 COMPLETE CBC W/AUTO DIFF WBC: CPT

## 2021-02-23 PROCEDURE — 36415 COLL VENOUS BLD VENIPUNCTURE: CPT

## 2021-02-23 NOTE — PROGRESS NOTES
"      PROBLEM LIST:  1. [id number] Colon cancer, right sided  A) right hemicolectomy on 4/29/19.  Pathology showed a moderately differentiated adenocarcinoma with mucinous features. 0/19 LN involved.  No LVI, no perineural invasion.  2. Hypertension  3. Hyperlipidemia  4. CAD    Subjective     CHIEF COMPLAINT: Colon cancer    HISTORY OF PRESENT ILLNESS:   Nilay Odonnell returns for follow-up for colon cancer.   He says he has been feeling pretty good.  Denies abdominal pain, changes in bowel movements, or weight loss.  Appetite is good.  He had colonoscopy done in November 2020.      Past Medical History, Past Surgical History, Social History, Family History have been reviewed and are without significant changes except as mentioned.    Review of Systems   A comprehensive 14 point review of systems was performed and was negative except as mentioned.    Medications:  The current medication list was reviewed in the EMR    ALLERGIES:    Allergies   Allergen Reactions   • Losartan Rash       Objective      /62   Pulse 64   Temp 97.3 °F (36.3 °C) (Temporal)   Resp 16   Ht 180.3 cm (71\")   Wt 98 kg (216 lb)   SpO2 98%   BMI 30.13 kg/m²      Performance Status: 0    General: well appearing male in no acute distress  Neuro: alert and oriented  HEENT: sclera anicteric, oropharynx clear  Lymphatics: no cervical, supraclavicular, or axillary adenopathy  Cardiovascular: regular rate and rhythm, no murmurs  Lungs: clear to auscultation bilaterally  Abdomen: soft, nontender, nondistended.  No palpable organomegaly  Extremeties: no lower extremity edema  Skin: no rashes, lesions, bruising, or petechiae  Psych: mood and affect appropriate      RECENT LABS:  Blood work was reviewed       Lab Results   Component Value Date    HGB 16.9 11/19/2019    HCT 52.1 (H) 11/19/2019    MCV 88.5 11/19/2019     11/19/2019    WBC 11.27 (H) 11/19/2019    NEUTROABS 8.21 (H) 11/19/2019    LYMPHSABS 2.10 11/19/2019    MONOSABS 0.67 " 11/19/2019    EOSABS 0.18 11/19/2019    BASOSABS 0.06 11/19/2019     Lab Results   Component Value Date    GLUCOSE 89 11/19/2019    BUN 12 01/11/2021    CREATININE 0.95 01/11/2021     01/11/2021    K 4.2 01/11/2021     01/11/2021    CO2 26.7 01/11/2021    CALCIUM 9.0 01/11/2021    PROTEINTOT 8.1 11/19/2019    ALBUMIN 4.50 01/11/2021    BILITOT 0.5 01/11/2021    ALKPHOS 63 01/11/2021    AST 19 01/11/2021    ALT 18 01/11/2021 08/21/2020 CT chest, abdomen and pelvis  IMPRESSION:  No evidence of metastatic disease within the chest, abdomen  or pelvis.    Assessment/Plan   Nilay Odonnell is a 70 y.o. year old male with a stage IIA right sided colon cancer.  We discussed scans from August/2020 showed no evidence of any metastatic disease in the chest, abdomen, or pelvis.  He has had no recent labs.  I will have him stop today and get a CEA, CBC, and CMP.  I will call him with results.      We we will continue the plan of yearly CT scan until 5 years from his diagnosis, with follow up and labs every 6 months.  I will order CT scan today for for prior to return.      Colonoscopy completed on 11/20/2020 by Dr. Hinojosa.  He will follow-up in 1 year for repeat colonoscopy.    Thrombocytosis.  Resolved at last check.  We will plan to check CBC today.        Follow-up in 6 months with labs and scans.                Asha Blankenship APRN  [facility]    2/23/2021          CC:

## 2021-02-24 LAB — CEA SERPL-MCNC: 1.11 NG/ML

## 2021-03-11 DIAGNOSIS — I10 ESSENTIAL HYPERTENSION: ICD-10-CM

## 2021-03-12 RX ORDER — LISINOPRIL 10 MG/1
TABLET ORAL
Qty: 90 TABLET | Refills: 1 | Status: SHIPPED | OUTPATIENT
Start: 2021-03-12 | End: 2021-08-31

## 2021-08-24 DIAGNOSIS — C18.2 MALIGNANT NEOPLASM OF ASCENDING COLON (HCC): Primary | ICD-10-CM

## 2021-08-31 DIAGNOSIS — I10 ESSENTIAL HYPERTENSION: ICD-10-CM

## 2021-08-31 RX ORDER — LISINOPRIL 10 MG/1
TABLET ORAL
Qty: 30 TABLET | Refills: 0 | Status: SHIPPED | OUTPATIENT
Start: 2021-08-31 | End: 2021-10-18 | Stop reason: SDUPTHER

## 2021-10-15 DIAGNOSIS — E78.5 HYPERLIPIDEMIA, UNSPECIFIED HYPERLIPIDEMIA TYPE: ICD-10-CM

## 2021-10-15 RX ORDER — PRAVASTATIN SODIUM 80 MG/1
TABLET ORAL
Qty: 90 TABLET | Refills: 3 | Status: SHIPPED | OUTPATIENT
Start: 2021-10-15 | End: 2021-10-18 | Stop reason: SDUPTHER

## 2021-10-15 NOTE — TELEPHONE ENCOUNTER
Rx Refill Note  Requested Prescriptions     Pending Prescriptions Disp Refills   • pravastatin (PRAVACHOL) 80 MG tablet [Pharmacy Med Name: PRAVASTATIN SODIUM 80 MG TAB] 90 tablet 3     Sig: TAKE 1 TABLET BY MOUTH EVERYDAY AT BEDTIME      Last office visit with prescribing clinician: 1/11/2021      Next office visit with prescribing clinician: 10/18/2021            Wing Blevins MA  10/15/21, 11:48 EDT

## 2021-10-18 ENCOUNTER — OFFICE VISIT (OUTPATIENT)
Dept: INTERNAL MEDICINE | Facility: CLINIC | Age: 71
End: 2021-10-18

## 2021-10-18 VITALS
BODY MASS INDEX: 30.49 KG/M2 | HEART RATE: 62 BPM | OXYGEN SATURATION: 98 % | TEMPERATURE: 97.5 F | DIASTOLIC BLOOD PRESSURE: 80 MMHG | WEIGHT: 217.8 LBS | HEIGHT: 71 IN | SYSTOLIC BLOOD PRESSURE: 124 MMHG

## 2021-10-18 DIAGNOSIS — I10 ESSENTIAL HYPERTENSION: ICD-10-CM

## 2021-10-18 DIAGNOSIS — H61.23 EXCESSIVE CERUMEN IN BOTH EAR CANALS: ICD-10-CM

## 2021-10-18 DIAGNOSIS — E78.5 HYPERLIPIDEMIA, UNSPECIFIED HYPERLIPIDEMIA TYPE: ICD-10-CM

## 2021-10-18 PROCEDURE — 99214 OFFICE O/P EST MOD 30 MIN: CPT | Performed by: NURSE PRACTITIONER

## 2021-10-18 RX ORDER — LISINOPRIL 10 MG/1
10 TABLET ORAL DAILY
Qty: 90 TABLET | Refills: 3 | Status: SHIPPED | OUTPATIENT
Start: 2021-10-18 | End: 2022-10-14 | Stop reason: SDUPTHER

## 2021-10-18 RX ORDER — PRAVASTATIN SODIUM 80 MG/1
80 TABLET ORAL
Qty: 90 TABLET | Refills: 3 | Status: SHIPPED | OUTPATIENT
Start: 2021-10-18 | End: 2023-01-18 | Stop reason: SDUPTHER

## 2021-10-18 RX ORDER — BISOPROLOL FUMARATE 5 MG/1
5 TABLET, FILM COATED ORAL DAILY
Qty: 90 TABLET | Refills: 3 | Status: SHIPPED | OUTPATIENT
Start: 2021-10-18 | End: 2022-10-24

## 2021-10-21 NOTE — PROGRESS NOTES
Office Visit      Patient Name: Nilay Odonnell  : 1950   MRN: [id number]     Chief Complaint:    Chief Complaint   Patient presents with   • Med Refill       History of Present Illness: Nilay Odonnell is a 71 y.o. male who is here today with request for medication refill.    Hypertension, hyperlipidemia.  Takes bisoprolol, lisinopril, pravastatin daily as prescribed.  Tries to eat a heart healthy diet.  Is physically active most days of the week.  Does not monitor blood pressure at home. Denies chest pain, dyspnea, orthopnea, palpitations, lower extremity edema, confusion, headaches, weakness, visual disturbances.        Subjective      I have reviewed and the following portions of the patient's history were updated as appropriate: past family history, past medical history, past social history, past surgical history and problem list.      Current Outpatient Medications:   •  bisoprolol (ZEBeta) 5 MG tablet, Take 1 tablet by mouth Daily., Disp: 90 tablet, Rfl: 3  •  carbamide peroxide (Debrox) 6.5 % otic solution, Administer 5 drops into both ears As Needed for Ear Pain., Disp: 22 mL, Rfl: 2  •  lisinopril (PRINIVIL,ZESTRIL) 10 MG tablet, Take 1 tablet by mouth Daily., Disp: 90 tablet, Rfl: 3  •  nitroglycerin (NITROSTAT) 0.4 MG SL tablet, Place 1 tablet under the tongue Every 5 (Five) Minutes As Needed for Chest Pain. Take no more than 3 doses in 15 minutes., Disp: 30 tablet, Rfl: 12  •  polyethylene glycol (MIRALAX) 17 GM/SCOOP powder, Mix 238g powder with 64 oz of clear liquid. Starting at 5pm drink 80z every 10-15 minutes until consumed., Disp: 238 g, Rfl: 0  •  pravastatin (PRAVACHOL) 80 MG tablet, Take 1 tablet by mouth every night at bedtime., Disp: 90 tablet, Rfl: 3    Allergies   Allergen Reactions   • Losartan Rash       Objective     Physical Exam:  Vital Signs:   Vitals:    10/18/21 1437   BP: 124/80   Pulse: 62   Temp: 97.5 °F (36.4 °C)   TempSrc: Infrared   SpO2: 98%   Weight: 98.8 kg  "(217 lb 12.8 oz)   Height: 180.3 cm (71\")     Body mass index is 30.38 kg/m².    Physical Exam  Constitutional:       Appearance: He is not ill-appearing.   HENT:      Head: Normocephalic.      Right Ear: External ear normal.      Left Ear: External ear normal.      Ears:      Comments: Bilateral excess cerumen  Eyes:      Conjunctiva/sclera: Conjunctivae normal.      Pupils: Pupils are equal, round, and reactive to light.   Cardiovascular:      Rate and Rhythm: Normal rate and regular rhythm.      Heart sounds: Normal heart sounds.   Pulmonary:      Effort: Pulmonary effort is normal.      Breath sounds: Normal breath sounds.   Musculoskeletal:      Cervical back: Normal range of motion and neck supple.   Skin:     General: Skin is warm.      Capillary Refill: Capillary refill takes less than 2 seconds.   Neurological:      Mental Status: He is alert and oriented to person, place, and time.      Coordination: Coordination normal.      Gait: Gait normal.   Psychiatric:         Attention and Perception: Attention normal.         Mood and Affect: Mood and affect normal.         Speech: Speech normal.         Behavior: Behavior normal.         Assessment / Plan      Assessment/Plan:   Diagnoses and all orders for this visit:    1. Essential hypertension  -     bisoprolol (ZEBeta) 5 MG tablet; Take 1 tablet by mouth Daily.  Dispense: 90 tablet; Refill: 3  -     lisinopril (PRINIVIL,ZESTRIL) 10 MG tablet; Take 1 tablet by mouth Daily.  Dispense: 90 tablet; Refill: 3        - Follow heart healthy diet.  Advised to reduce daily sodium intake to < 1500 mg per day.  Avoid processed & fast foods.        - Monitor blood pressure as discussed, keep log and bring to next appointment.          - Exercise as tolerated, with a goal of 30 minutes of moderate exercise most days.         - Take medications as prescribed.    2. Hyperlipidemia, unspecified hyperlipidemia type  -     pravastatin (PRAVACHOL) 80 MG tablet; Take 1 tablet by " mouth every night at bedtime.  Dispense: 90 tablet; Refill: 3        - Heart healthy diet, daily physical activity    3. Excessive cerumen in both ear canals       - Use drops prescribed previously, debrox    Follow Up:   Return if symptoms worsen or fail to improve.    Patient was given instructions and counseling regarding his condition or for health maintenance advice. Please see specific information pulled into the AVS if appropriate.       [facility] [address] [facility]     Please note that portions of this note may have been completed with a voice recognition program. Efforts were made to edit dictation, but occasionally words are mistranscribed.

## 2022-01-13 ENCOUNTER — TELEPHONE (OUTPATIENT)
Dept: SURGERY | Facility: CLINIC | Age: 72
End: 2022-01-13

## 2022-04-20 NOTE — PROGRESS NOTES
Patient: Nilay Odonnell    YOB: 1950    Date: 04/22/2022    Primary Care Provider: India Jerome APRN    Chief Complaint   Patient presents with   • Consult     Consult for colonoscopy and EGD.       SUBJECTIVE:    History of present illness:  I saw the patient in the office today as a consultation for evaluation and treatment of EGD and Colonoscopy. He states he is doing well and is having no issues.  Patient had an EGD indicating a duodenal ulcer that was rather large at removing of the time.  A clip was placed.  This was 3 years ago.  He continues to have mild epigastric pain but doing well otherwise.  He stopped lifting tobacco and is doing better.  He is also taking PPI medication.  Patient also had a right colon carcinoma with status post colectomy 3 years ago.    The following portions of the patient's history were reviewed and updated as appropriate: allergies, current medications, past family history, past medical history, past social history, past surgical history and problem list.    Review of Systems   Constitutional: Negative for chills, fever and unexpected weight change.   HENT: Negative for trouble swallowing and voice change.    Eyes: Negative for visual disturbance.   Respiratory: Negative for apnea, cough, chest tightness, shortness of breath and wheezing.    Cardiovascular: Negative for chest pain, palpitations and leg swelling.   Gastrointestinal: Negative for abdominal distention, abdominal pain, anal bleeding, blood in stool, constipation, diarrhea, nausea, rectal pain and vomiting.   Endocrine: Negative for cold intolerance and heat intolerance.   Genitourinary: Negative for difficulty urinating, dysuria, flank pain, scrotal swelling and testicular pain.   Musculoskeletal: Negative for back pain, gait problem and joint swelling.   Skin: Negative for color change, rash and wound.   Neurological: Negative for dizziness, syncope, speech difficulty, weakness, numbness  and headaches.   Hematological: Negative for adenopathy. Does not bruise/bleed easily.   Psychiatric/Behavioral: Negative for confusion. The patient is not nervous/anxious.        History:  Past Medical History:   Diagnosis Date   • Abdominal pain    • Bruises easily    • Cancer (HCC)     skin cancer   • Coronary artery disease     1 stent   • Dyslipidemia    • Helicobacter pylori infection    • History of transfusion 04/2019   • Hyperlipidemia    • Hypertension    • Peptic ulcer    • Small bowel mass    • Subsequent ST elevation (STEMI) myocardial infarction of inferior wall (HCC) 07/29/2011    99% RCA (2.75 x 23 Promus post with 3.0 Quantum), 60% OM, LVEF normal.   • Teeth missing    • Wears glasses     reading glasses only       Past Surgical History:   Procedure Laterality Date   • CARDIAC CATHETERIZATION      stent x1   • COLON RESECTION Right 4/29/2019    Procedure: COLECTOMY LAPAROSCOPIC HAND ASSISTED;  Surgeon: Kenneth Hinojosa MD;  Location: [facility] OR;  Service: General   • COLONOSCOPY N/A 4/8/2019    Procedure: COLONOSCOPY WITH COLD BIOPSY, HOT BIOPSY POLYPECTOMY;  Surgeon: Kenneth Hinojosa MD;  Location: [facility] ENDOSCOPY;  Service: General   • COLONOSCOPY N/A 11/23/2020    Procedure: COLONOSCOPY with biopsy and hot biopsy polypectomy;  Surgeon: Kenneth Hinojosa MD;  Location: [facility] ENDOSCOPY;  Service: Gastroenterology;  Laterality: N/A;   • ENDOSCOPY N/A 4/8/2019    Procedure: ESOPHAGOGASTRODUODENOSCOPY WITH BIOPSY, QUICK CLIP PRO PLACEMENT;  Surgeon: Kenneth Hinojosa MD;  Location: [facility] ENDOSCOPY;  Service: General   • SKIN BIOPSY      left ear       Family History   Problem Relation Age of Onset   • Parkinsonism Mother    • Emphysema Father    • No Known Problems Sister        Social History     Tobacco Use   • Smoking status: Never Smoker   • Smokeless tobacco: Former User     Types: Snuff     Quit date: 4/11/2020   Substance Use Topics   • Alcohol use: No   • Drug use: No  "      Allergies:  Allergies   Allergen Reactions   • Losartan Rash       Medications:    Current Outpatient Medications:   •  bisoprolol (ZEBeta) 5 MG tablet, Take 1 tablet by mouth Daily., Disp: 90 tablet, Rfl: 3  •  lisinopril (PRINIVIL,ZESTRIL) 10 MG tablet, Take 1 tablet by mouth Daily., Disp: 90 tablet, Rfl: 3  •  nitroglycerin (NITROSTAT) 0.4 MG SL tablet, Place 1 tablet under the tongue Every 5 (Five) Minutes As Needed for Chest Pain. Take no more than 3 doses in 15 minutes., Disp: 30 tablet, Rfl: 12  •  pravastatin (PRAVACHOL) 80 MG tablet, Take 1 tablet by mouth every night at bedtime., Disp: 90 tablet, Rfl: 3  •  bisacodyl (Dulcolax) 5 MG EC tablet, Take 2 tablets at 3 pm and 2 tablets at 7 pm the day prior to colonoscopy, Disp: 4 tablet, Rfl: 0  •  carbamide peroxide (Debrox) 6.5 % otic solution, Administer 5 drops into both ears As Needed for Ear Pain., Disp: 22 mL, Rfl: 2  •  polyethylene glycol (MiraLax) 17 g packet, Mix 238 gram powder with 64 oz of clear liquid starting at 5 pm. Drink 8 oz every 10-15 minutes until consumed., Disp: 238 each, Rfl: 0  •  polyethylene glycol (MIRALAX) 17 GM/SCOOP powder, Mix 238g powder with 64 oz of clear liquid. Starting at 5pm drink 80z every 10-15 minutes until consumed., Disp: 238 g, Rfl: 0    OBJECTIVE:    Vital Signs:   Vitals:    04/22/22 1323   BP: 106/64   BP Location: Left arm   Patient Position: Sitting   Cuff Size: Adult   Pulse: 77   Temp: 97.3 °F (36.3 °C)   TempSrc: Temporal   SpO2: 95%   Weight: 91.8 kg (202 lb 6.4 oz)   Height: 180.3 cm (71\")       Physical Exam:   General Appearance:    Alert, cooperative, in no acute distress   Head:    Normocephalic, without obvious abnormality, atraumatic   Eyes:            Lids and lashes normal, conjunctivae and sclerae normal, no   icterus, no pallor, corneas clear, PERRL   Ears:    Ears appear intact with no abnormalities noted   Throat:   No oral lesions, no thrush, oral mucosa moist   Neck:   No adenopathy, " supple, trachea midline, no thyromegaly,  no JVD   Lungs:     Clear to auscultation,respirations regular, even and                  unlabored    Heart:    Regular rhythm and normal rate, normal S1 and S2, no            murmur   Abdomen:     no masses, no organomegaly, soft non-tender, non-distended, no guarding, there is no evidence of tenderness   Extremities:   Moves all extremities well, no edema, no cyanosis, no             redness   Pulses:   Pulses palpable and equal bilaterally   Skin:   No bleeding, bruising or rash   Lymph nodes:   No palpable adenopathy   Neurologic:   Cranial nerves 2 - 12 grossly intact, sensation intact      Results Review:   I reviewed the patient's new clinical results.    Review of Systems was reviewed and confirmed as accurate as documented by the MA.    ASSESSMENT/PLAN:    1. Pre-op testing    2. Duodenal ulcer    3. History of colon cancer        I recommend a colonoscopy and EGD for further evaluation. The procedure was explained as well as the risks which include but are not limited to bleeding, infection, perforation, abdominal pain etc. The patient understands these risks and the procedure and wishes to proceed.  Patient told to continue to avoid alcohol and nicotine caffeine.  Would recommend staying on PPI medication.    Electronically signed by Kenneth Hinjoosa MD  04/22/22 07:45 EDT

## 2022-04-22 ENCOUNTER — OFFICE VISIT (OUTPATIENT)
Dept: SURGERY | Facility: CLINIC | Age: 72
End: 2022-04-22

## 2022-04-22 VITALS
TEMPERATURE: 97.3 F | SYSTOLIC BLOOD PRESSURE: 106 MMHG | HEIGHT: 71 IN | BODY MASS INDEX: 28.34 KG/M2 | WEIGHT: 202.4 LBS | HEART RATE: 77 BPM | DIASTOLIC BLOOD PRESSURE: 64 MMHG | OXYGEN SATURATION: 95 %

## 2022-04-22 DIAGNOSIS — Z01.818 PRE-OP TESTING: Primary | ICD-10-CM

## 2022-04-22 DIAGNOSIS — Z85.038 HISTORY OF COLON CANCER: ICD-10-CM

## 2022-04-22 DIAGNOSIS — K26.9 DUODENAL ULCER: ICD-10-CM

## 2022-04-22 PROCEDURE — 99214 OFFICE O/P EST MOD 30 MIN: CPT | Performed by: SURGERY

## 2022-04-22 RX ORDER — POLYETHYLENE GLYCOL 3350 17 G/17G
POWDER, FOR SOLUTION ORAL
Qty: 238 EACH | Refills: 0 | Status: SHIPPED | OUTPATIENT
Start: 2022-04-22 | End: 2023-01-18

## 2022-04-22 RX ORDER — BISACODYL 5 MG
TABLET, DELAYED RELEASE (ENTERIC COATED) ORAL
Qty: 4 TABLET | Refills: 0 | Status: SHIPPED | OUTPATIENT
Start: 2022-04-22 | End: 2023-01-18

## 2022-04-26 PROBLEM — Z85.038 HISTORY OF COLON CANCER: Status: ACTIVE | Noted: 2022-04-26

## 2022-05-03 ENCOUNTER — TELEPHONE (OUTPATIENT)
Dept: SURGERY | Facility: CLINIC | Age: 72
End: 2022-05-03

## 2022-05-04 ENCOUNTER — LAB (OUTPATIENT)
Dept: LAB | Facility: HOSPITAL | Age: 72
End: 2022-05-04

## 2022-05-04 DIAGNOSIS — Z01.818 PRE-OP TESTING: ICD-10-CM

## 2022-05-04 PROCEDURE — U0004 COV-19 TEST NON-CDC HGH THRU: HCPCS

## 2022-05-04 PROCEDURE — C9803 HOPD COVID-19 SPEC COLLECT: HCPCS

## 2022-05-05 LAB — SARS-COV-2 RNA NOSE QL NAA+PROBE: NOT DETECTED

## 2022-05-05 NOTE — PRE-PROCEDURE INSTRUCTIONS
PAT phone history completed with pt for upcoming procedure on      PAT PASS GIVEN/REVIEWED WITH PT.  VERBALIZED UNDERSTANDING OF THE FOLLOWING:  DO NOT EAT, DRINK, SMOKE, USE SMOKELESS TOBACCO OR CHEW GUM AFTER MIDNIGHT THE NIGHT BEFORE SURGERY.  THIS ALSO INCLUDES HARD CANDIES AND MINTS.    DO NOT SHAVE THE AREA TO BE OPERATED ON AT LEAST 48 HOURS PRIOR TO THE PROCEDURE.  DO NOT WEAR MAKE UP OR NAIL POLISH.  DO NOT LEAVE IN ANY PIERCING OR WEAR JEWELRY THE DAY OF SURGERY.      DO NOT USE ADHESIVES IF YOU WEAR DENTURES.    DO NOT WEAR EYE CONTACTS; BRING IN YOUR GLASSES.    ONLY TAKE MEDICATION THE MORNING OF YOUR PROCEDURE IF INSTRUCTED BY YOUR SURGEON WITH ENOUGH WATER TO SWALLOW THE MEDICATION.  IF YOUR SURGEON DID NOT SPECIFY WHICH MEDICATIONS TO TAKE, YOU WILL NEED TO CALL THEIR OFFICE FOR FURTHER INSTRUCTIONS AND DO AS THEY INSTRUCT.    LEAVE ANYTHING YOU CONSIDER VALUABLE AT HOME.    YOU WILL NEED TO ARRANGE FOR SOMEONE TO DRIVE YOU HOME AFTER SURGERY.  IT IS RECOMMENDED THAT YOU DO NOT DRIVE, WORK, DRINK ALCOHOL OR MAKE MAJOR DECISIONS FOR AT LEAST 24 HOURS AFTER YOUR PROCEDURE IS COMPLETE.      THE DAY OF YOUR PROCEDURE, BRING IN THE FOLLOWING IF APPLICABLE:   PICTURE ID AND INSURANCE/MEDICARE OR MEDICAID CARDS/ANY CO-PAY THAT MAY BE DUE   COPY OF ADVANCED DIRECTIVE/LIVING WILL/POWER OR    CPAP/BIPAP/INHALERS   SKIN PREP SHEET   YOUR PREADMISSION TESTING PASS (IF NOT A PHONE HISTORY)           COVID self-quarantine instructions reviewed with the pt.  Verbalized understanding.     Pt instructed that pre-operative RN should visualize a bowel movement prior to procedure, and that is should be clear yellow liquid, not cloudy.  Pt asked to notify RN if need to have a BM while still in the waiting room, so the nurse can visualize the prep results.  If the nurse is unable to visualize a BM, Dr. Hinojosa will be ordering an enema, to ensure visualization of returns.  Pt verbalized understanding.Pt instructed  on PAT PASS information.  Pt verbalizes understanding.

## 2022-05-06 ENCOUNTER — ANESTHESIA EVENT (OUTPATIENT)
Dept: GASTROENTEROLOGY | Facility: HOSPITAL | Age: 72
End: 2022-05-06

## 2022-05-06 ENCOUNTER — HOSPITAL ENCOUNTER (OUTPATIENT)
Facility: HOSPITAL | Age: 72
Setting detail: HOSPITAL OUTPATIENT SURGERY
Discharge: HOME | End: 2022-05-06
Attending: SURGERY | Admitting: SURGERY

## 2022-05-06 ENCOUNTER — ANESTHESIA (OUTPATIENT)
Dept: GASTROENTEROLOGY | Facility: HOSPITAL | Age: 72
End: 2022-05-06

## 2022-05-06 VITALS
HEIGHT: 71 IN | DIASTOLIC BLOOD PRESSURE: 82 MMHG | BODY MASS INDEX: 30.1 KG/M2 | WEIGHT: 215 LBS | SYSTOLIC BLOOD PRESSURE: 133 MMHG | TEMPERATURE: 97.7 F | OXYGEN SATURATION: 95 % | RESPIRATION RATE: 18 BRPM | HEART RATE: 75 BPM

## 2022-05-06 DIAGNOSIS — Z01.818 PRE-OP TESTING: ICD-10-CM

## 2022-05-06 DIAGNOSIS — Z85.038 HISTORY OF COLON CANCER: ICD-10-CM

## 2022-05-06 DIAGNOSIS — K26.9 DUODENAL ULCER: ICD-10-CM

## 2022-05-06 PROCEDURE — 25010000002 PROPOFOL 10 MG/ML EMULSION: Performed by: NURSE ANESTHETIST, CERTIFIED REGISTERED

## 2022-05-06 PROCEDURE — 45384 COLONOSCOPY W/LESION REMOVAL: CPT | Performed by: SURGERY

## 2022-05-06 PROCEDURE — 43239 EGD BIOPSY SINGLE/MULTIPLE: CPT | Performed by: SURGERY

## 2022-05-06 PROCEDURE — 88305 TISSUE EXAM BY PATHOLOGIST: CPT

## 2022-05-06 RX ORDER — SODIUM CHLORIDE, SODIUM LACTATE, POTASSIUM CHLORIDE, CALCIUM CHLORIDE 600; 310; 30; 20 MG/100ML; MG/100ML; MG/100ML; MG/100ML
1000 INJECTION, SOLUTION INTRAVENOUS CONTINUOUS
Status: DISCONTINUED | OUTPATIENT
Start: 2022-05-06 | End: 2022-05-06 | Stop reason: HOSPADM

## 2022-05-06 RX ORDER — LIDOCAINE HYDROCHLORIDE 20 MG/ML
INJECTION, SOLUTION INTRAVENOUS AS NEEDED
Status: DISCONTINUED | OUTPATIENT
Start: 2022-05-06 | End: 2022-05-06 | Stop reason: SURG

## 2022-05-06 RX ORDER — PROPOFOL 10 MG/ML
VIAL (ML) INTRAVENOUS AS NEEDED
Status: DISCONTINUED | OUTPATIENT
Start: 2022-05-06 | End: 2022-05-06 | Stop reason: SURG

## 2022-05-06 RX ORDER — ONDANSETRON 2 MG/ML
4 INJECTION INTRAMUSCULAR; INTRAVENOUS ONCE AS NEEDED
Status: DISCONTINUED | OUTPATIENT
Start: 2022-05-06 | End: 2022-05-06 | Stop reason: HOSPADM

## 2022-05-06 RX ORDER — KETAMINE HYDROCHLORIDE 50 MG/ML
INJECTION, SOLUTION, CONCENTRATE INTRAMUSCULAR; INTRAVENOUS AS NEEDED
Status: DISCONTINUED | OUTPATIENT
Start: 2022-05-06 | End: 2022-05-06 | Stop reason: SURG

## 2022-05-06 RX ADMIN — PROPOFOL 50 MG: 10 INJECTION, EMULSION INTRAVENOUS at 11:44

## 2022-05-06 RX ADMIN — SODIUM CHLORIDE, POTASSIUM CHLORIDE, SODIUM LACTATE AND CALCIUM CHLORIDE 1000 ML: 600; 310; 30; 20 INJECTION, SOLUTION INTRAVENOUS at 11:12

## 2022-05-06 RX ADMIN — PROPOFOL 100 MG: 10 INJECTION, EMULSION INTRAVENOUS at 11:42

## 2022-05-06 RX ADMIN — KETAMINE HYDROCHLORIDE 20 MG: 50 INJECTION, SOLUTION INTRAMUSCULAR; INTRAVENOUS at 11:36

## 2022-05-06 RX ADMIN — LIDOCAINE HYDROCHLORIDE 40 MG: 20 INJECTION, SOLUTION INTRAVENOUS at 11:36

## 2022-05-06 RX ADMIN — PROPOFOL 100 MG: 10 INJECTION, EMULSION INTRAVENOUS at 11:36

## 2022-05-06 NOTE — DISCHARGE INSTRUCTIONS
No pushing, pulling, tugging,  heavy lifting, or strenuous activity.  No major decision making, driving, or drinking alcoholic beverages for 24 hours. ( due to the medications you have  received)  Always use good hand hygiene/washing techniques.  NO driving while taking pain medications.    Rest today    To assist you in voiding:  Drink plenty of fluids  Listen to running water while attempting to void.    If you are unable to urinate and you have an uncomfortable urge to void or it has been   6 hours since you were discharged, return to the Emergency Room

## 2022-05-06 NOTE — ANESTHESIA POSTPROCEDURE EVALUATION
Patient: Nilay Odonnell    Procedure Summary     Date: 05/06/22 Room / Location: [facility] ENDOSCOPY 3 / [facility] ENDOSCOPY    Anesthesia Start: 1132 Anesthesia Stop:     Procedures:       ESOPHAGOGASTRODUODENOSCOPY WITH BIOPSY (N/A Esophagus)      COLONOSCOPY with polypectomy (N/A ) Diagnosis:       Pre-op testing      Duodenal ulcer      History of colon cancer      (Pre-op testing [Z01.818])      (Duodenal ulcer [K26.9])      (History of colon cancer [Z85.038])    Surgeons: Kenneth Hinojosa MD Provider: Inocente Daniels CRNA    Anesthesia Type: MAC ASA Status: 3          Anesthesia Type: MAC    Vitals  HR 76  Sat 93  Resp 18  Temp 97.3  /73        Post Anesthesia Care and Evaluation    Patient location during evaluation: bedside  Patient participation: complete - patient participated  Level of consciousness: awake and alert and sleepy but conscious  Pain score: 0  Pain management: adequate  Airway patency: patent  Anesthetic complications: No anesthetic complications  PONV Status: none  Cardiovascular status: acceptable  Respiratory status: acceptable and nasal cannula  Hydration status: acceptable

## 2022-05-06 NOTE — ANESTHESIA PREPROCEDURE EVALUATION
Anesthesia Evaluation     Patient summary reviewed and Nursing notes reviewed   no history of anesthetic complications:  NPO Solid Status: > 8 hours  NPO Liquid Status: > 8 hours           Airway   Mallampati: II  TM distance: >3 FB  Neck ROM: full  no difficulty expected  Dental - normal exam   (+) poor dentition    Pulmonary - normal exam   Cardiovascular - normal exam    ECG reviewed  Patient on routine beta blocker  Rhythm: regular  Rate: normal    (+) hypertension 2 medications or greater, past MI , CAD, cardiac stents hyperlipidemia,     ROS comment: Echo 2016  Conclusions  1. Normal stress ECHO.     Findings Rest  Left Ventricle:  Global left ventricular wall motion and contractility are within normal  limits. The estimated ejection fraction is 55-60%.        Neuro/Psych  GI/Hepatic/Renal/Endo      Musculoskeletal     Abdominal  - normal exam    Abdomen: soft.  Bowel sounds: normal.   Substance History      OB/GYN          Other      history of cancer    ROS/Med Hx Other: Post colectomy                   Anesthesia Plan    ASA 3     MAC   (Risks and benefits discussed including risk of aspiration, recall and dental damage. All patient questions answered. Will continue with POC.)  intravenous induction     Anesthetic plan, all risks, benefits, and alternatives have been provided, discussed and informed consent has been obtained with: patient.        CODE STATUS:

## 2022-05-09 LAB — REF LAB TEST METHOD: NORMAL

## 2022-05-16 ENCOUNTER — TELEPHONE (OUTPATIENT)
Dept: SURGERY | Facility: CLINIC | Age: 72
End: 2022-05-16

## 2022-05-16 NOTE — TELEPHONE ENCOUNTER
Called to reschedule no show appointment on 5/16/2022.  Phone number no in service.  Mailed no show letter.

## 2022-06-15 NOTE — PROGRESS NOTES
Patient: Nilay Odonnell    YOB: 1950    Date: 06/17/2022    Primary Care Provider: India Jerome APRN    Reason for Consultation: Follow-up EGD and colonoscopy    Chief Complaint   Patient presents with   • Follow-up     EGD and colon       History of present illness:  I saw the patient in the office today as a followup from their recent EGD with biopsy, the pathology report did show mild chronic gastritis and tubular adenoma.  They state that they have done well.  Patient doing well, doing well with PPI medication and minimal abdominal pain.    The following portions of the patient's history were reviewed and updated as appropriate: allergies, current medications, past family history, past medical history, past social history, past surgical history and problem list.    Review of Systems   Constitutional: Negative for chills, fever and unexpected weight change.   HENT: Negative for trouble swallowing and voice change.    Eyes: Negative for visual disturbance.   Respiratory: Negative for apnea, cough, chest tightness, shortness of breath and wheezing.    Cardiovascular: Negative for chest pain, palpitations and leg swelling.   Gastrointestinal: Negative for abdominal distention, abdominal pain, anal bleeding, blood in stool, constipation, diarrhea, nausea, rectal pain and vomiting.   Endocrine: Negative for cold intolerance and heat intolerance.   Genitourinary: Negative for difficulty urinating, dysuria, flank pain, scrotal swelling and testicular pain.   Musculoskeletal: Negative for back pain, gait problem and joint swelling.   Skin: Negative for color change, rash and wound.   Neurological: Negative for dizziness, syncope, speech difficulty, weakness, numbness and headaches.   Hematological: Negative for adenopathy. Does not bruise/bleed easily.   Psychiatric/Behavioral: Negative for confusion. The patient is not nervous/anxious.        Vital Signs:   Vitals:    06/17/22 1354   BP:  "124/76   Pulse: 56   Temp: 97.7 °F (36.5 °C)   TempSrc: Infrared   SpO2: 97%   Weight: 98.9 kg (218 lb)   Height: 180.3 cm (71\")       Allergies:  Allergies   Allergen Reactions   • Losartan Rash       Medications:    Current Outpatient Medications:   •  bisoprolol (ZEBeta) 5 MG tablet, Take 1 tablet by mouth Daily., Disp: 90 tablet, Rfl: 3  •  carbamide peroxide (Debrox) 6.5 % otic solution, Administer 5 drops into both ears As Needed for Ear Pain., Disp: 22 mL, Rfl: 2  •  lisinopril (PRINIVIL,ZESTRIL) 10 MG tablet, Take 1 tablet by mouth Daily., Disp: 90 tablet, Rfl: 3  •  nitroglycerin (NITROSTAT) 0.4 MG SL tablet, Place 1 tablet under the tongue Every 5 (Five) Minutes As Needed for Chest Pain. Take no more than 3 doses in 15 minutes., Disp: 30 tablet, Rfl: 12  •  polyethylene glycol (MiraLax) 17 g packet, Mix 238 gram powder with 64 oz of clear liquid starting at 5 pm. Drink 8 oz every 10-15 minutes until consumed., Disp: 238 each, Rfl: 0  •  pravastatin (PRAVACHOL) 80 MG tablet, Take 1 tablet by mouth every night at bedtime., Disp: 90 tablet, Rfl: 3  •  bisacodyl (Dulcolax) 5 MG EC tablet, Take 2 tablets at 3 pm and 2 tablets at 7 pm the day prior to colonoscopy, Disp: 4 tablet, Rfl: 0  •  polyethylene glycol (MIRALAX) 17 GM/SCOOP powder, Mix 238g powder with 64 oz of clear liquid. Starting at 5pm drink 80z every 10-15 minutes until consumed., Disp: 238 g, Rfl: 0    Physical Exam:   General Appearance:    Alert, cooperative, in no acute distress   Abdomen:     no masses, no organomegaly, soft with minimal epigastric tenderness.  No rebound or guarding.   Chest:      Clear toausculation            Cor:  Regular rate and rhythm      Results Review:   I reviewed the patient's new clinical results.    Review of Systems was reviewed and confirmed as accurate as documented by the MA.    Assessment / Plan:    1. Gastroesophageal reflux disease with esophagitis without hemorrhage    2. Chronic gastric ulcer without " hemorrhage and without perforation        I did discuss the situation with the patient today in the office and they have done well from their recent EGD with biopsy. I have told the patient continue PPI medication, avoid caffeine alcohol and nicotine.  Repeat colonoscopy in 5 years..    Electronically signed by Kenneth Hinojosa MD  06/17/22

## 2022-06-17 ENCOUNTER — OFFICE VISIT (OUTPATIENT)
Dept: SURGERY | Facility: CLINIC | Age: 72
End: 2022-06-17

## 2022-06-17 VITALS
HEART RATE: 56 BPM | SYSTOLIC BLOOD PRESSURE: 124 MMHG | DIASTOLIC BLOOD PRESSURE: 76 MMHG | TEMPERATURE: 97.7 F | WEIGHT: 218 LBS | HEIGHT: 71 IN | BODY MASS INDEX: 30.52 KG/M2 | OXYGEN SATURATION: 97 %

## 2022-06-17 DIAGNOSIS — K21.00 GASTROESOPHAGEAL REFLUX DISEASE WITH ESOPHAGITIS WITHOUT HEMORRHAGE: Primary | ICD-10-CM

## 2022-06-17 DIAGNOSIS — K25.7 CHRONIC GASTRIC ULCER WITHOUT HEMORRHAGE AND WITHOUT PERFORATION: ICD-10-CM

## 2022-06-17 PROCEDURE — 99212 OFFICE O/P EST SF 10 MIN: CPT | Performed by: SURGERY

## 2022-10-12 ENCOUNTER — PATIENT MESSAGE (OUTPATIENT)
Dept: INTERNAL MEDICINE | Facility: CLINIC | Age: 72
End: 2022-10-12

## 2022-10-14 DIAGNOSIS — I10 ESSENTIAL HYPERTENSION: ICD-10-CM

## 2022-10-14 RX ORDER — LISINOPRIL 10 MG/1
10 TABLET ORAL DAILY
Qty: 90 TABLET | Refills: 3 | Status: SHIPPED | OUTPATIENT
Start: 2022-10-14

## 2022-10-23 DIAGNOSIS — I10 ESSENTIAL HYPERTENSION: ICD-10-CM

## 2022-10-24 RX ORDER — BISOPROLOL FUMARATE 5 MG/1
5 TABLET, FILM COATED ORAL DAILY
Qty: 30 TABLET | Refills: 0 | Status: SHIPPED | OUTPATIENT
Start: 2022-10-24 | End: 2022-11-22

## 2022-11-22 DIAGNOSIS — I10 ESSENTIAL HYPERTENSION: ICD-10-CM

## 2022-11-22 RX ORDER — BISOPROLOL FUMARATE 5 MG/1
5 TABLET, FILM COATED ORAL DAILY
Qty: 30 TABLET | Refills: 0 | Status: SHIPPED | OUTPATIENT
Start: 2022-11-22 | End: 2022-12-27

## 2022-12-24 DIAGNOSIS — I10 ESSENTIAL HYPERTENSION: ICD-10-CM

## 2022-12-27 RX ORDER — BISOPROLOL FUMARATE 5 MG/1
TABLET, FILM COATED ORAL
Qty: 30 TABLET | Refills: 0 | Status: SHIPPED | OUTPATIENT
Start: 2022-12-27 | End: 2023-01-26

## 2023-01-05 NOTE — TELEPHONE ENCOUNTER
----- Message from Kimberly Nolen MD sent at 8/21/2020 11:55 AM EDT -----  Please let him know ct scans are normal - no evidence of malignancy.  He was supposed to be scheduled for 6 month f/u but it doesn't look like its done, please get that set up (with bharati).    Thx.    ----- Message -----  From: Interface, Rad Results [place] [facility]  Sent: 8/21/2020  10:44 AM EDT  To: Kimberly Nolen MD       [Alert] : alert [No Acute Distress] : no acute distress [Normocephalic] : normocephalic [EOMI Bilateral] : EOMI bilateral [Clear tympanic membranes with bony landmarks and light reflex present bilaterally] : clear tympanic membranes with bony landmarks and light reflex present bilaterally  [Pink Nasal Mucosa] : pink nasal mucosa [Nonerythematous Oropharynx] : nonerythematous oropharynx [Supple, full passive range of motion] : supple, full passive range of motion [No Palpable Masses] : no palpable masses [Clear to Auscultation Bilaterally] : clear to auscultation bilaterally [Regular Rate and Rhythm] : regular rate and rhythm [Normal S1, S2 audible] : normal S1, S2 audible [No Murmurs] : no murmurs [+2 Femoral Pulses] : +2 femoral pulses [Soft] : soft [NonTender] : non tender [Non Distended] : non distended [Normoactive Bowel Sounds] : normoactive bowel sounds [No Hepatomegaly] : no hepatomegaly [No Splenomegaly] : no splenomegaly [Cy: _____] : Cy [unfilled] [No Abnormal Lymph Nodes Palpated] : no abnormal lymph nodes palpated [Normal Muscle Tone] : normal muscle tone [No Gait Asymmetry] : no gait asymmetry [No pain or deformities with palpation of bone, muscles, joints] : no pain or deformities with palpation of bone, muscles, joints [Straight] : straight [+2 Patella DTR] : +2 patella DTR [Cranial Nerves Grossly Intact] : cranial nerves grossly intact [No Rash or Lesions] : no rash or lesions

## 2023-01-18 ENCOUNTER — OFFICE VISIT (OUTPATIENT)
Dept: INTERNAL MEDICINE | Facility: CLINIC | Age: 73
End: 2023-01-18
Payer: MEDICARE

## 2023-01-18 VITALS
DIASTOLIC BLOOD PRESSURE: 76 MMHG | HEART RATE: 80 BPM | BODY MASS INDEX: 29.96 KG/M2 | TEMPERATURE: 98.5 F | HEIGHT: 71 IN | SYSTOLIC BLOOD PRESSURE: 126 MMHG | OXYGEN SATURATION: 98 % | WEIGHT: 214 LBS

## 2023-01-18 DIAGNOSIS — Z00.00 ANNUAL PHYSICAL EXAM: ICD-10-CM

## 2023-01-18 DIAGNOSIS — Z23 NEED FOR IMMUNIZATION AGAINST INFLUENZA: ICD-10-CM

## 2023-01-18 DIAGNOSIS — Z00.00 ENCOUNTER FOR SUBSEQUENT ANNUAL WELLNESS VISIT (AWV) IN MEDICARE PATIENT: Primary | ICD-10-CM

## 2023-01-18 DIAGNOSIS — Z13.228 SCREENING FOR ENDOCRINE, METABOLIC AND IMMUNITY DISORDER: ICD-10-CM

## 2023-01-18 DIAGNOSIS — Z13.29 SCREENING FOR ENDOCRINE, METABOLIC AND IMMUNITY DISORDER: ICD-10-CM

## 2023-01-18 DIAGNOSIS — Z13.0 SCREENING FOR DISORDER OF BLOOD AND BLOOD-FORMING ORGANS: ICD-10-CM

## 2023-01-18 DIAGNOSIS — I25.10 CORONARY ARTERY DISEASE INVOLVING NATIVE CORONARY ARTERY OF NATIVE HEART WITHOUT ANGINA PECTORIS: ICD-10-CM

## 2023-01-18 DIAGNOSIS — E66.3 OVERWEIGHT WITH BODY MASS INDEX (BMI) OF 29 TO 29.9 IN ADULT: ICD-10-CM

## 2023-01-18 DIAGNOSIS — I10 ESSENTIAL HYPERTENSION: ICD-10-CM

## 2023-01-18 DIAGNOSIS — Z13.0 SCREENING FOR ENDOCRINE, METABOLIC AND IMMUNITY DISORDER: ICD-10-CM

## 2023-01-18 DIAGNOSIS — E78.5 HYPERLIPIDEMIA, UNSPECIFIED HYPERLIPIDEMIA TYPE: ICD-10-CM

## 2023-01-18 DIAGNOSIS — E87.5 HYPERKALEMIA: ICD-10-CM

## 2023-01-18 DIAGNOSIS — Z85.038 HISTORY OF COLON CANCER: ICD-10-CM

## 2023-01-18 DIAGNOSIS — Z13.220 SCREENING FOR LIPID DISORDERS: ICD-10-CM

## 2023-01-18 PROBLEM — Z72.0 CHEWING TOBACCO USE: Status: RESOLVED | Noted: 2019-04-06 | Resolved: 2023-01-18

## 2023-01-18 PROCEDURE — 96160 PT-FOCUSED HLTH RISK ASSMT: CPT | Performed by: NURSE PRACTITIONER

## 2023-01-18 PROCEDURE — G0439 PPPS, SUBSEQ VISIT: HCPCS | Performed by: NURSE PRACTITIONER

## 2023-01-18 PROCEDURE — 99397 PER PM REEVAL EST PAT 65+ YR: CPT | Performed by: NURSE PRACTITIONER

## 2023-01-18 PROCEDURE — G0008 ADMIN INFLUENZA VIRUS VAC: HCPCS | Performed by: NURSE PRACTITIONER

## 2023-01-18 PROCEDURE — 1159F MED LIST DOCD IN RCRD: CPT | Performed by: NURSE PRACTITIONER

## 2023-01-18 PROCEDURE — 90662 IIV NO PRSV INCREASED AG IM: CPT | Performed by: NURSE PRACTITIONER

## 2023-01-18 PROCEDURE — 1170F FXNL STATUS ASSESSED: CPT | Performed by: NURSE PRACTITIONER

## 2023-01-18 RX ORDER — UREA 10 %
LOTION (ML) TOPICAL
COMMUNITY

## 2023-01-18 RX ORDER — PRAVASTATIN SODIUM 80 MG/1
80 TABLET ORAL
Qty: 90 TABLET | Refills: 3 | Status: SHIPPED | OUTPATIENT
Start: 2023-01-18

## 2023-01-18 NOTE — PROGRESS NOTES
The ABCs of the Annual Wellness Visit  Subsequent Medicare Wellness Visit    Subjective      Nilay Odonnell is a 72 y.o. male who presents for a Subsequent Medicare Wellness Visit and annual physical.    The following portions of the patient's history were reviewed and   updated as appropriate: allergies, current medications, past family history, past medical history, past social history, past surgical history and problem list.    Compared to one year ago, the patient feels his physical   health is the same.    Compared to one year ago, the patient feels his mental   health is the same.    Recent Hospitalizations:  He was not admitted to the hospital during the last year.       Current Medical Providers:  Patient Care Team:  India Jerome APRN as PCP - General (Family Medicine)  Kenneth Hinojosa MD as Consulting Physician (General Surgery)    Outpatient Medications Prior to Visit   Medication Sig Dispense Refill   • carbamide peroxide (Debrox) 6.5 % otic solution Administer 5 drops into both ears As Needed for Ear Pain. 22 mL 2   • lisinopril (PRINIVIL,ZESTRIL) 10 MG tablet Take 1 tablet by mouth Daily. 90 tablet 3   • melatonin 1 MG tablet Take  by mouth.     • nitroglycerin (NITROSTAT) 0.4 MG SL tablet Place 1 tablet under the tongue Every 5 (Five) Minutes As Needed for Chest Pain. Take no more than 3 doses in 15 minutes. 30 tablet 12   • bisoprolol (ZEBeta) 5 MG tablet TAKE 1 TABLET BY MOUTH DAILY. PT NEEDS TO SCHEDULE APPOINTMENT FOR ANNUAL 30 tablet 0   • pravastatin (PRAVACHOL) 80 MG tablet Take 1 tablet by mouth every night at bedtime. 90 tablet 3   • bisacodyl (Dulcolax) 5 MG EC tablet Take 2 tablets at 3 pm and 2 tablets at 7 pm the day prior to colonoscopy 4 tablet 0   • polyethylene glycol (MiraLax) 17 g packet Mix 238 gram powder with 64 oz of clear liquid starting at 5 pm. Drink 8 oz every 10-15 minutes until consumed. 238 each 0   • polyethylene glycol (MIRALAX) 17 GM/SCOOP powder Mix 238g  "powder with 64 oz of clear liquid. Starting at 5pm drink 80z every 10-15 minutes until consumed. 238 g 0     No facility-administered medications prior to visit.       No opioid medication identified on active medication list. I have reviewed chart for other potential  high risk medication/s and harmful drug interactions in the elderly.          Aspirin is not on active medication list.  Aspirin use is not indicated based on review of current medical condition/s. Risk of harm outweighs potential benefits.  .    Patient Active Problem List   Diagnosis   • Coronary artery disease involving native coronary artery of native heart   • HTN (hypertension)   • Dyslipidemia   • Gastrointestinal hemorrhage   • Thrombocytosis   • Acute blood loss anemia   • Nodule of colon   • Duodenal ulcer   • Colon cancer screening   • History of colon cancer     Advance Care Planning  Advance Directive is not on file.  ACP discussion was held with the patient during this visit. Patient does not have an advance directive, information provided.     Objective    Vitals:    01/18/23 1236   BP: 126/76   Pulse: 80   Temp: 98.5 °F (36.9 °C)   SpO2: 98%   Weight: 97.1 kg (214 lb)   Height: 180.3 cm (70.98\")   PainSc: 0-No pain     Estimated body mass index is 29.86 kg/m² as calculated from the following:    Height as of this encounter: 180.3 cm (70.98\").    Weight as of this encounter: 97.1 kg (214 lb).    BMI is >= 25 and <30. (Overweight) The following options were offered after discussion;: exercise counseling/recommendations and nutrition counseling/recommendations    Physical Exam  Constitutional:       Appearance: He is well-developed. He is not ill-appearing.   HENT:      Head: Normocephalic.      Right Ear: Tympanic membrane, ear canal and external ear normal.      Left Ear: Tympanic membrane, ear canal and external ear normal.      Nose: Nose normal.      Mouth/Throat:      Mouth: Mucous membranes are moist.      Pharynx: Oropharynx is " clear. Uvula midline.   Eyes:      Extraocular Movements: Extraocular movements intact.      Conjunctiva/sclera: Conjunctivae normal.      Pupils: Pupils are equal, round, and reactive to light.   Neck:      Thyroid: No thyromegaly.      Vascular: No carotid bruit.   Cardiovascular:      Rate and Rhythm: Normal rate and regular rhythm.      Pulses:           Radial pulses are 2+ on the right side and 2+ on the left side.        Dorsalis pedis pulses are 2+ on the right side and 2+ on the left side.      Heart sounds: Normal heart sounds.   Pulmonary:      Effort: Pulmonary effort is normal.      Breath sounds: Normal breath sounds.   Abdominal:      General: Bowel sounds are normal.      Palpations: Abdomen is soft.      Tenderness: There is no abdominal tenderness.   Musculoskeletal:         General: No tenderness or deformity. Normal range of motion.      Cervical back: Full passive range of motion without pain, normal range of motion and neck supple.      Right lower leg: No edema.      Left lower leg: No edema.   Lymphadenopathy:      Cervical: No cervical adenopathy.   Skin:     General: Skin is warm.      Capillary Refill: Capillary refill takes less than 2 seconds.   Neurological:      Mental Status: He is alert and oriented to person, place, and time.      Sensory: No sensory deficit.      Coordination: Coordination normal.      Gait: Gait normal.      Comments: CN II-XII normal   Psychiatric:         Attention and Perception: Attention normal.         Mood and Affect: Mood and affect normal.         Speech: Speech normal.         Behavior: Behavior normal.         Thought Content: Thought content normal.         Does the patient have evidence of cognitive impairment?   No    Lab Results   Component Value Date    CHLPL 137 01/18/2023    TRIG 86 01/18/2023    HDL 37 (L) 01/18/2023    LDL 83 01/18/2023    VLDL 17 01/18/2023          HEALTH RISK ASSESSMENT    Smoking Status:  Social History     Tobacco Use    Smoking Status Never   Smokeless Tobacco Former   • Types: Snuff   • Quit date: 4/11/2020     Alcohol Consumption:  Social History     Substance and Sexual Activity   Alcohol Use No     Fall Risk Screen:    STEADI Fall Risk Assessment was completed, and patient is at LOW risk for falls.Assessment completed on:1/18/2023    Depression Screening:  PHQ-2/PHQ-9 Depression Screening 1/18/2023   Little Interest or Pleasure in Doing Things 0-->not at all   Feeling Down, Depressed or Hopeless 0-->not at all   PHQ-9: Brief Depression Severity Measure Score 0       Health Habits and Functional and Cognitive Screening:  Functional & Cognitive Status 1/18/2023   Do you have difficulty preparing food and eating? No   Do you have difficulty bathing yourself, getting dressed or grooming yourself? No   Do you have difficulty using the toilet? No   Do you have difficulty moving around from place to place? No   Do you have trouble with steps or getting out of a bed or a chair? No   Current Diet -   Dental Exam -   Eye Exam -   Exercise (times per week) -   Current Exercise Activities Include -   Do you need help using the phone?  No   Are you deaf or do you have serious difficulty hearing?  No   Do you need help with transportation? No   Do you need help shopping? No   Do you need help preparing meals?  No   Do you need help with housework?  No   Do you need help with laundry? No   Do you need help taking your medications? No   Do you need help managing money? No   Do you ever drive or ride in a car without wearing a seat belt? Yes   Have you felt unusual stress, anger or loneliness in the last month? No   Who do you live with? Alone   If you need help, do you have trouble finding someone available to you? No   Have you been bothered in the last four weeks by sexual problems? No   Do you have difficulty concentrating, remembering or making decisions? No       Age-appropriate Screening Schedule:  Refer to the list below for future  screening recommendations based on patient's age, sex and/or medical conditions. Orders for these recommended tests are listed in the plan section. The patient has been provided with a written plan.    Health Maintenance   Topic Date Due   • TDAP/TD VACCINES (1 - Tdap) 01/16/2024 (Originally 4/4/1969)   • LIPID PANEL  01/18/2024   • INFLUENZA VACCINE  Completed   • ZOSTER VACCINE  Completed   • COLONOSCOPY  Discontinued                CMS Preventative Services Quick Reference  Risk Factors Identified During Encounter:    Immunizations Discussed/Encouraged: Influenza and Pneumococcal 23  Inactivity/Sedentary: Patient was advised to exercise at least 150 minutes a week per CDC recommendations.    The above risks/problems have been discussed with the patient.  Pertinent information has been shared with the patient in the After Visit Summary.    Diagnoses and all orders for this visit:    1. Encounter for subsequent annual wellness visit (AWV) in Medicare patient (Primary)    2. Annual physical exam    3. Essential hypertension  -     Comprehensive Metabolic Panel        - Follow heart healthy diet.  Keep sodium intake < 1500 mg per day.  Avoid processed & fast foods.          - Exercise as tolerated, with a goal of 30 minutes of moderate exercise most days.         - Take medications as prescribed.    4. Coronary artery disease involving native coronary artery of native heart without angina pectoris        - Heart healthy diet, daily physical activity.  Use nitroglycerin sublingual tablets when needed for chest pain.  Patient cannot recall last use of NTG    5. Hyperlipidemia, unspecified hyperlipidemia type  -     pravastatin (PRAVACHOL) 80 MG tablet; Take 1 tablet by mouth every night at bedtime.  Dispense: 90 tablet; Refill: 3  - Heart healthy diet, daily physical activity     6. Screening for lipid disorders  -     Lipid Panel    7. Screening for endocrine, metabolic and immunity disorder  -     Comprehensive  Metabolic Panel    8. Screening for disorder of blood and blood-forming organs  -     CBC & Differential    9. History of colon cancer        - Keep up with screenings    10. Overweight with body mass index (BMI) of 29 to 29.9 in adult        - BMI is >= 25 and <30. (Overweight) The following options were offered after discussion;: exercise counseling/recommendations and nutrition counseling/recommendations     11. Need for immunization against influenza  -     Fluzone High-Dose 65+yrs (5350-4407)          Follow Up:   Next Medicare Wellness visit to be scheduled in 1 year.      An After Visit Summary and PPPS were made available to the patient.

## 2023-01-19 LAB
ALBUMIN SERPL-MCNC: 4.7 G/DL (ref 3.5–5.2)
ALBUMIN/GLOB SERPL: 2.5 G/DL
ALP SERPL-CCNC: 65 U/L (ref 39–117)
ALT SERPL-CCNC: 21 U/L (ref 1–41)
AST SERPL-CCNC: 20 U/L (ref 1–40)
BASOPHILS # BLD AUTO: 0.07 10*3/MM3 (ref 0–0.2)
BASOPHILS NFR BLD AUTO: 0.7 % (ref 0–1.5)
BILIRUB SERPL-MCNC: 0.4 MG/DL (ref 0–1.2)
BUN SERPL-MCNC: 13 MG/DL (ref 8–23)
BUN/CREAT SERPL: 11.5 (ref 7–25)
CALCIUM SERPL-MCNC: 9.7 MG/DL (ref 8.6–10.5)
CHLORIDE SERPL-SCNC: 104 MMOL/L (ref 98–107)
CHOLEST SERPL-MCNC: 137 MG/DL (ref 0–200)
CO2 SERPL-SCNC: 26 MMOL/L (ref 22–29)
CREAT SERPL-MCNC: 1.13 MG/DL (ref 0.76–1.27)
EGFRCR SERPLBLD CKD-EPI 2021: 68.6 ML/MIN/1.73
EOSINOPHIL # BLD AUTO: 0.42 10*3/MM3 (ref 0–0.4)
EOSINOPHIL NFR BLD AUTO: 4.2 % (ref 0.3–6.2)
ERYTHROCYTE [DISTWIDTH] IN BLOOD BY AUTOMATED COUNT: 12.1 % (ref 12.3–15.4)
GLOBULIN SER CALC-MCNC: 1.9 GM/DL
GLUCOSE SERPL-MCNC: 92 MG/DL (ref 65–99)
HCT VFR BLD AUTO: 49.7 % (ref 37.5–51)
HDLC SERPL-MCNC: 37 MG/DL (ref 40–60)
HGB BLD-MCNC: 16.9 G/DL (ref 13–17.7)
IMM GRANULOCYTES # BLD AUTO: 0.05 10*3/MM3 (ref 0–0.05)
IMM GRANULOCYTES NFR BLD AUTO: 0.5 % (ref 0–0.5)
LDLC SERPL CALC-MCNC: 83 MG/DL (ref 0–100)
LYMPHOCYTES # BLD AUTO: 2.39 10*3/MM3 (ref 0.7–3.1)
LYMPHOCYTES NFR BLD AUTO: 24.1 % (ref 19.6–45.3)
MCH RBC QN AUTO: 31.6 PG (ref 26.6–33)
MCHC RBC AUTO-ENTMCNC: 34 G/DL (ref 31.5–35.7)
MCV RBC AUTO: 93.1 FL (ref 79–97)
MONOCYTES # BLD AUTO: 0.69 10*3/MM3 (ref 0.1–0.9)
MONOCYTES NFR BLD AUTO: 7 % (ref 5–12)
NEUTROPHILS # BLD AUTO: 6.3 10*3/MM3 (ref 1.7–7)
NEUTROPHILS NFR BLD AUTO: 63.5 % (ref 42.7–76)
NRBC BLD AUTO-RTO: 0 /100 WBC (ref 0–0.2)
PLATELET # BLD AUTO: 230 10*3/MM3 (ref 140–450)
POTASSIUM SERPL-SCNC: 5.9 MMOL/L (ref 3.5–5.2)
PROT SERPL-MCNC: 6.6 G/DL (ref 6–8.5)
RBC # BLD AUTO: 5.34 10*6/MM3 (ref 4.14–5.8)
SODIUM SERPL-SCNC: 141 MMOL/L (ref 136–145)
TRIGL SERPL-MCNC: 86 MG/DL (ref 0–150)
VLDLC SERPL CALC-MCNC: 17 MG/DL (ref 5–40)
WBC # BLD AUTO: 9.92 10*3/MM3 (ref 3.4–10.8)

## 2023-01-21 ENCOUNTER — LAB (OUTPATIENT)
Dept: LAB | Facility: HOSPITAL | Age: 73
End: 2023-01-21
Payer: MEDICARE

## 2023-01-21 DIAGNOSIS — E87.5 HYPERKALEMIA: ICD-10-CM

## 2023-01-21 LAB — POTASSIUM SERPL-SCNC: 5.1 MMOL/L (ref 3.5–5.2)

## 2023-01-21 PROCEDURE — 84132 ASSAY OF SERUM POTASSIUM: CPT

## 2023-01-21 PROCEDURE — 36415 COLL VENOUS BLD VENIPUNCTURE: CPT

## 2023-01-23 ENCOUNTER — TELEPHONE (OUTPATIENT)
Dept: INTERNAL MEDICINE | Facility: CLINIC | Age: 73
End: 2023-01-23
Payer: MEDICARE

## 2023-01-26 DIAGNOSIS — I10 ESSENTIAL HYPERTENSION: ICD-10-CM

## 2023-01-26 RX ORDER — BISOPROLOL FUMARATE 5 MG/1
5 TABLET, FILM COATED ORAL DAILY
Qty: 90 TABLET | Refills: 1 | Status: SHIPPED | OUTPATIENT
Start: 2023-01-26

## 2023-04-21 NOTE — ANESTHESIA PROCEDURE NOTES
79 F with history of coronary disease CABG in 2000, ICM with ejection fraction 45% and paroxysmal atrial fibrillation, h -sees Dr. Aparicio at [facility], history of pneumonia last year presented to [facility] due to shortness of breath. .Per EMS pt started having difficulty breathing at 10 a.m -after severe coughing spell patient took cough syrup and subsequently developed severe vomiting; pt  has had  cough the last couple of days  On the field oxygen saturation 77% on room air.  Patient was started on BiPAP.  In ED chest x-ray showed right middle lobe infiltrate.  COVID-negative.  Pulmonary and cardiology were consulted    Subjective: Today she is feeling much better.  Breathing much improved no nausea or vomiting.  Tolerating diet  Oxygen saturation stable on 3 L  Telemetry A-fib rate controlled    Last Recorded Vitals  Blood pressure 99/51, pulse 89, temperature 98.6 °F (37 °C), temperature source Temporal, resp. rate 16, height 5' 5\" (1.651 m), weight 89.9 kg (198 lb 3.1 oz), SpO2 97 %.    Physical Exam  Constitutional: NAD, Alert and oriented x 3.  HENT:  Normocephalic. Atraumatic. Bilateral external ears normal. Oropharynx moist.   Neck:  Supple.     Eyes:  PERRL, EOMI. Conjunctivae normal. No discharge.    Cardiovascular:  Normal rate.  Irregular regular no murmurs, gallops, or rubs.    Respiratory:  No respiratory distress.  Mild coarse breath sounds on the right left side clear no wheezing.    Gastrointestinal:  abd soft,NT/ND Bowel sounds normal. No organomegaly  Integumentary:  Warm. Dry. No erythema. No rash.    Musculoskeletal: ROM full, intact distal pulses. No edema.  No calf tenderness laurita.     Neurologic:  No focal deficits noted.     Medications  Medications Prior to Admission   Medication Sig Dispense Refill   • furosemide (LASIX) 20 MG tablet Take 20 mg by mouth daily.     • losartan (COZAAR) 100 MG tablet Take 100 mg by mouth daily.     • ferrous sulfate 325 (65 FE) MG tablet Take 1 tablet  Peripheral Block      Patient reassessed immediately prior to procedure    Patient location during procedure: post-op  Start time: 4/29/2019 8:59 AM  Stop time: 4/29/2019 9:02 AM  Reason for block: at surgeon's request and post-op pain management  Performed by  CRNA: Jj Jolley CRNA  Preanesthetic Checklist  Completed: patient identified, site marked, surgical consent, pre-op evaluation, timeout performed, IV checked, risks and benefits discussed and monitors and equipment checked  Prep:  Pt Position: supine  Sterile barriers:gloves, cap, sterile barriers and mask  Prep: ChloraPrep  Patient monitoring: blood pressure monitoring, continuous pulse oximetry and EKG  Procedure    Guidance:ultrasound guided  Images:still images obtained    Laterality:Bilateral  Block Type:TAP  Injection Technique:single-shot  Needle Type:echogenic and Tuohy  Needle Gauge:20 G  Resistance on Injection: none  Medications Used: lidocaine PF (XYLOCAINE) injection 0.5%, 20 mL  Med admintered at 4/29/2019 9:00 AM  Medications  Preservative Free Saline:20ml  Comment:Block Injection: LA dose divided between Right and Left Block  Adjuncts per total volume of LA:    NONE      If required, intravenous sedation was given -- see meds on anesthesia record.    Post Assessment  Injection Assessment: negative aspiration for heme, no paresthesia on injection and incremental injection  Patient Tolerance:comfortable throughout block  Complications:no  Additional Notes  Procedure:      BILATERAL TAP BLOCKS                              The pt was placed in the Supine Position and under Ultrasound guidance, an echogenic or touhy needle was advanced with Normal Saline hydro dissection of tissue.  The Internal Oblique and Transversus Abdominus muscles were visualized.  At or before the aponeurosis of Internal Oblique, the local anesthetic spread was visualized in the Transversus Abdominus Plane. Injection was made incrementally with aspiration every 5 mls.   There was no intravascular injection;  injection pressure was normal; there was no neural injection; and the procedure was completed without difficulty.               by mouth daily (with breakfast). Do not start before August 14, 2021.     • DULoxetine (CYMBALTA) 30 MG capsule Take 30 mg by mouth daily.     • sertraline (ZOLOFT) 100 MG tablet Take 100 mg by mouth at bedtime.     • acetaminophen (TYLENOL) 325 MG tablet Take 2 tablets by mouth every 4 hours as needed.     • insulin glargine (LANTUS) 100 UNIT/ML vial solution Inject 12 Units into the skin nightly. 10 mL 12   • insulin lispro 100 UNIT/ML scheduled dose AND correction dose Inject 0-6 Units into the skin in the morning and 0-6 Units at noon and 0-6 Units in the evening. Inject with meals. Please follow insulin sliding scale. Daughter unsure of exact range.  12   • apixaBAN (ELIQUIS) 5 MG Tab Take 5 mg by mouth every 12 hours. Indications: Cerebrovascular accident secondary to Atrial Fibrillation     • gabapentin (NEURONTIN) 600 MG tablet Take 600 mg by mouth 2 times daily.      • isosorbide mononitrate (IMDUR) 30 MG 24 hr tablet Take 30 mg by mouth daily.     • Multiple Vitamins-Minerals (ICAPS MV PO) Take 1 tablet by mouth 2 times daily.      • oxybutynin (DITROPAN) 5 MG tablet Take 5 mg by mouth 2 times daily.     • simvastatin (ZOCOR) 80 MG tablet Take 40 mg by mouth nightly. 40 mg daily     • omeprazole (PRILOSEC) 40 MG capsule Take 40 mg by mouth 2 times daily.      • albuterol-ipratropium (COMBIVENT RESPIMAT) 100-20 MCG/ACT inhaler Inhale 1 puff into the lungs 4 times daily as needed for Shortness of Breath.        Current Facility-Administered Medications   Medication   • cefTRIAXone (ROCEPHIN) syringe 2,000 mg   • methylPREDNISolone (SOLU-Medrol) PF injection 40 mg   • doxycycline (VIBRAMYCIN) 100 mg in sodium chloride 0.9 % 100 mL IVPB   • atorvastatin (LIPITOR) tablet 40 mg   • sertraline (ZOLOFT) tablet 100 mg   • oxybutynin (DITROPAN) tablet 5 mg   • pantoprazole (PROTONIX) EC tablet 40 mg   • [Held by provider] losartan (COZAAR) tablet 100 mg   • [Held by provider] isosorbide mononitrate (IMDUR) ER tablet  30 mg   • insulin glargine (LANTUS) injection 12 Units   • gabapentin (NEURONTIN) capsule 600 mg   • ferrous sulfate (65 mg Fe per 325 mg) tablet 325 mg   • apixaBAN (ELIQUIS) tablet 5 mg   • sodium chloride 0.9 % flush bag 25 mL   • sodium chloride (PF) 0.9 % injection 2 mL   • sodium chloride (NORMAL SALINE) 0.9 % bolus 500 mL   • dextrose 50 % injection 25 g   • dextrose 50 % injection 12.5 g   • glucagon (GLUCAGEN) injection 1 mg   • dextrose (GLUTOSE) 40 % gel 15 g   • dextrose (GLUTOSE) 40 % gel 30 g   • insulin lispro (ADMELOG,HumaLOG) - Correction Dose   • insulin lispro (ADMELOG,HumaLOG) - Correction Dose   • hydrALAZINE (APRESOLINE) injection 10 mg   • acetaminophen (TYLENOL) tablet 650 mg   • ondansetron (ZOFRAN) injection 4 mg   • ipratropium-albuterol (DUONEB) 0.5-2.5 (3) MG/3ML nebulizer solution 3 mL   • benzonatate (TESSALON PERLES) capsule 100 mg   • guaiFENesin 100 MG/5ML solution 200 mg   • DULoxetine (CYMBALTA) capsule 30 mg        Labs  Recent Results (from the past 24 hour(s))   Lactic Acid, Venous    Collection Time: 04/20/23  1:38 PM   Result Value Ref Range    Lactate, Venous 1.5 0.0 - 2.0 mmol/L   GLUCOSE, BEDSIDE - POINT OF CARE    Collection Time: 04/20/23  3:10 PM   Result Value Ref Range    GLUCOSE, BEDSIDE - POINT OF CARE 67 (L) 70 - 99 mg/dL   GLUCOSE, BEDSIDE - POINT OF CARE    Collection Time: 04/20/23  4:12 PM   Result Value Ref Range    GLUCOSE, BEDSIDE - POINT OF CARE 80 70 - 99 mg/dL   BLOOD GAS, ARTERIAL WITH COOXIMETRY - RESPIRATORY    Collection Time: 04/20/23  4:16 PM   Result Value Ref Range    BASE EXCESS / DEFICIT, ARTERIAL - RESPIRATORY 5 (H) -2 - 3 mmol/L    HCO3, ARTERIAL - RESPIRATORY 31 (H) 22 - 28 mmol/L    O2 CONTENT, ARTERIAL - RESPIRATORY 15 15 - 23 %    PCO2, ARTERIAL - RESPIRATORY 47 (H) 32 - 45 mm Hg    PH, ARTERIAL - RESPIRATORY 7.42 7.35 - 7.45 Units    PO2, ARTERIAL - RESPIRATORY 119 (H) 83 - 108 mm Hg    O2 SATURATION, ARTERIAL - RESPIRATORY 99 95 - 99 %     CONDITION - RESPIRATORY BIPAP 12/5     CARBOXYHEMOGLOBIN - RESPIRATORY 1.3 <1.5 %    FIO2 - RESPIRATORY 60 %    HEMOGLOBIN - RESPIRATORY 10.6 (L) 12.0 - 15.5 g/dL    METHEMOGLOBIN - RESPIRATORY 0.0 <=1.6 %    OXYHEMOGLOBIN, ARTERIAL - RESPIRATORY 97.9 94.0 - 98.0 %    SITE - RESPIRATORY Right Radial    POTASSIUM - RESPIRATORY    Collection Time: 04/20/23  4:16 PM   Result Value Ref Range    POTASSIUM - RESPIRATORY 4.0 3.4 - 5.1 mmol/L   SODIUM - RESPIRATORY    Collection Time: 04/20/23  4:16 PM   Result Value Ref Range    SODIUM - RESPIRATORY 140 135 - 145 mmol/L   CALCIUM, IONIZED - RESPIRATORY    Collection Time: 04/20/23  4:16 PM   Result Value Ref Range    CALCIUM, IONIZED - RESPIRATORY 1.13 (L) 1.15 - 1.29 mmol/L   LACTIC ACID, ARTERIAL - RESPIRATORY    Collection Time: 04/20/23  4:16 PM   Result Value Ref Range    LACTIC ACID, ARTERIAL - RESPIRATORY 1.1 <1.6 mmol/L   GLUCOSE - RESPIRATORY    Collection Time: 04/20/23  4:16 PM   Result Value Ref Range    GLUCOSE - RESPIRATORY 81 65 - 99 mg/dL   CHLORIDE - RESPIRATORY    Collection Time: 04/20/23  4:16 PM   Result Value Ref Range    CHLORIDE - RESPIRATORY 102 97 - 110 mmol/L   Legionella Urine Antigen    Collection Time: 04/20/23  6:11 PM    Specimen: Urine   Result Value Ref Range    LEGIONELLA URINE ANTIGEN Negative for Legionella pneumophila Serogroup 1 Negative for Legionella pneumophila Serogroup 1   Streptococcus Pneumoniae Antigen    Collection Time: 04/20/23  6:11 PM    Specimen: Urine   Result Value Ref Range    STREPTOCOCCUS PNEUMONIAE ANTIGEN Negative for Streptococcus pneumoniae antigen Negative for Streptococcus pneumoniae antigen   GLUCOSE, BEDSIDE - POINT OF CARE    Collection Time: 04/20/23  8:39 PM   Result Value Ref Range    GLUCOSE, BEDSIDE - POINT OF CARE 137 (H) 70 - 99 mg/dL   Electrocardiogram 12-Lead    Collection Time: 04/20/23 10:13 PM   Result Value Ref Range    Ventricular Rate EKG/Min (BPM) 97     Atrial Rate (BPM) 82      QRS-Interval (MSEC) 92     QT-Interval (MSEC) 306     QTc 389     R Axis (Degrees) 22     T Axis (Degrees) 147     REPORT TEXT       Atrial fibrillation  Incomplete right bundle branch block  Septal infarct  (cited on or before  03-AUG-2021)  Abnormal ECG  When compared with ECG of  20-APR-2023 11:27,  Nonspecific T wave abnormality, worse in  Anterior leads     Magnesium    Collection Time: 04/21/23  3:43 AM   Result Value Ref Range    Magnesium 1.8 1.7 - 2.4 mg/dL   Comprehensive Metabolic Panel    Collection Time: 04/21/23  3:43 AM   Result Value Ref Range    Fasting Status      Sodium 141 135 - 145 mmol/L    Potassium 4.2 3.4 - 5.1 mmol/L    Chloride 104 97 - 110 mmol/L    Carbon Dioxide 31 21 - 32 mmol/L    Anion Gap 10 7 - 19 mmol/L    Glucose 121 (H) 70 - 99 mg/dL    BUN 31 (H) 6 - 20 mg/dL    Creatinine 1.15 (H) 0.51 - 0.95 mg/dL    Glomerular Filtration Rate 48 (L) >=60    BUN/Cr 27 (H) 7 - 25    Calcium 8.0 (L) 8.4 - 10.2 mg/dL    Bilirubin, Total 0.6 0.2 - 1.0 mg/dL    GOT/AST 24 <=37 Units/L    GPT/ALT 23 <64 Units/L    Alkaline Phosphatase 79 45 - 117 Units/L    Albumin 3.0 (L) 3.6 - 5.1 g/dL    Protein, Total 6.5 6.4 - 8.2 g/dL    Globulin 3.5 2.0 - 4.0 g/dL    A/G Ratio 0.9 (L) 1.0 - 2.4   CBC with Automated Differential (performable only)    Collection Time: 04/21/23  3:43 AM   Result Value Ref Range    WBC 11.5 (H) 4.2 - 11.0 K/mcL    RBC 3.27 (L) 4.00 - 5.20 mil/mcL    HGB 9.8 (L) 12.0 - 15.5 g/dL    HCT 30.7 (L) 36.0 - 46.5 %    MCV 93.9 78.0 - 100.0 fl    MCH 30.0 26.0 - 34.0 pg    MCHC 31.9 (L) 32.0 - 36.5 g/dL    RDW-CV 13.8 11.0 - 15.0 %    RDW-SD 46.8 39.0 - 50.0 fL     140 - 450 K/mcL    NRBC 0 <=0 /100 WBC   Manual Differential    Collection Time: 04/21/23  3:43 AM   Result Value Ref Range    Neutrophil, Percent 72 %    Lymphocytes, Percent 4 %    Mono, Percent 6 %    Eosinophils, Percent 0 %    Basophils, Percent 0 %    Bands, Percent 17 (H) 0 - 10 %    Myelocytes, Percent 1 (H) <=0  %    Absolute Neutrophil 10.2 (H) 1.8 - 7.7 K/mcL    Absolute Lymphocytes 0.5 (L) 1.0 - 4.0 K/mcL    Absolute Monocytes 0.7 0.3 - 0.9 K/mcL    Absolute Eosinophils 0.0 0.0 - 0.5 K/mcL    Absolute Basophils 0.0 0.0 - 0.3 K/mcL    Platelet Morphology Normal Normal    Toxic Granulation Present    Electrocardiogram 12-Lead    Collection Time: 04/21/23  4:29 AM   Result Value Ref Range    Ventricular Rate EKG/Min (BPM) 85     Atrial Rate (BPM) 115     QRS-Interval (MSEC) 84     QT-Interval (MSEC) 338     QTc 402     R Axis (Degrees) 16     T Axis (Degrees) 156     REPORT TEXT       Atrial fibrillation  Septal infarct  (cited on or before  03-AUG-2021)  Abnormal ECG  When compared with ECG of  20-APR-2023 22:13,  No significant change was found     GLUCOSE, BEDSIDE - POINT OF CARE    Collection Time: 04/21/23 12:20 PM   Result Value Ref Range    GLUCOSE, BEDSIDE - POINT OF CARE 177 (H) 70 - 99 mg/dL        Imaging:      Past Medical History  Past Medical History:   Diagnosis Date   • Arthritis    • Colon polyps    • Congestive cardiac failure (CMD)    • COVID-19 10/31/2022   • Diabetes mellitus (CMD)    • Essential (primary) hypertension    • Fracture    • High cholesterol    • Hx of CABG    • Myocardial infarction (CMD)    • Paroxysmal atrial fibrillation (CMD)    • Sleep apnea     \"unable to wear mask\"        Surgical History  Past Surgical History:   Procedure Laterality Date   • Cholecystectomy     • Coronary artery bypass graft      x6   • Eye surgery     • Hysterectomy     • Open access colonoscopy  2013   • Rotator cuff repair Right    • Total knee arthroplasty Right 2020        Social History  Social History     Tobacco Use   • Smoking status: Never   • Smokeless tobacco: Never   Vaping Use   • Vaping status: never used     Passive vaping exposure: Yes   Substance Use Topics   • Alcohol use: Never   • Drug use: Never       Family History  No family history on file.     Allergies  ALLERGIES:  Iodine    (environmental or med) and Shellfish allergy   (food or med)      Assessment & Plan   No problem-specific Assessment & Plan notes found for this encounter.    Acute hypoxic respiratory failure improved  community-acquired pneumonia RML probably aspiration  COPD  ABGs 7.42, CO2 47  Continue antibiotics to treat pneumonia Rocephin and Zithromax  Systemic steroids  Nebulizers as needed  Wean off oxygen currently on 3 L  Incentive spirometry  Pulmonary input appreciated    Sepsis secondary to above, present on admission  Due to pneumonia  As above  Metabolic encephalopathy    History of ischemic cardiomyopathy with chronic systolic congestive heart failure-clinically stable  CAD  Clinically compensated  EF45% on TTE 11/2022  Plan  Since blood pressure on the lower side we will hold off on diuretics and hold Cozaar  Holding off on diuretics  Cardiology put appreciated  Patient sees Dr. Aparicio at [facility]      PAF in NSR  Continue Eliquis beta-blockers    Diabetes mellitus type 2 on insulin  At home takes Lantus 12 units at night plus sliding scale  A1c from November 2022 was 6.8  A.m. blood glucose 120  Patient received Lantus last night at 2100  Plan  [facility]  ISS for correction    Disposition: Pending hospital course; hopefully home on Monday    PCP: David Gomez MD     Code Status    Code Status: Selective Treatment/DNR    Benjamin Costello MD

## 2023-09-22 DIAGNOSIS — I10 ESSENTIAL HYPERTENSION: ICD-10-CM

## 2023-09-22 RX ORDER — LISINOPRIL 10 MG/1
TABLET ORAL
Qty: 90 TABLET | Refills: 3 | Status: SHIPPED | OUTPATIENT
Start: 2023-09-22

## 2024-01-15 DIAGNOSIS — E78.5 HYPERLIPIDEMIA, UNSPECIFIED HYPERLIPIDEMIA TYPE: ICD-10-CM

## 2024-01-15 RX ORDER — PRAVASTATIN SODIUM 80 MG/1
80 TABLET ORAL
Qty: 90 TABLET | Refills: 3 | Status: SHIPPED | OUTPATIENT
Start: 2024-01-15

## 2024-02-05 ENCOUNTER — OFFICE VISIT (OUTPATIENT)
Dept: INTERNAL MEDICINE | Facility: CLINIC | Age: 74
End: 2024-02-05
Payer: MEDICARE

## 2024-02-05 VITALS
HEIGHT: 71 IN | HEART RATE: 76 BPM | TEMPERATURE: 98.8 F | DIASTOLIC BLOOD PRESSURE: 64 MMHG | BODY MASS INDEX: 31.36 KG/M2 | SYSTOLIC BLOOD PRESSURE: 108 MMHG | WEIGHT: 224 LBS | OXYGEN SATURATION: 96 %

## 2024-02-05 DIAGNOSIS — G47.00 INSOMNIA, UNSPECIFIED TYPE: ICD-10-CM

## 2024-02-05 DIAGNOSIS — Z00.00 ANNUAL PHYSICAL EXAM: ICD-10-CM

## 2024-02-05 DIAGNOSIS — Z13.0 SCREENING FOR DISORDER OF BLOOD AND BLOOD-FORMING ORGANS: ICD-10-CM

## 2024-02-05 DIAGNOSIS — R49.9 CHANGE IN VOICE: ICD-10-CM

## 2024-02-05 DIAGNOSIS — I25.10 CORONARY ARTERY DISEASE INVOLVING NATIVE CORONARY ARTERY OF NATIVE HEART WITHOUT ANGINA PECTORIS: ICD-10-CM

## 2024-02-05 DIAGNOSIS — I10 ESSENTIAL HYPERTENSION: ICD-10-CM

## 2024-02-05 DIAGNOSIS — Z85.038 HISTORY OF COLON CANCER: ICD-10-CM

## 2024-02-05 DIAGNOSIS — R53.83 OTHER FATIGUE: ICD-10-CM

## 2024-02-05 DIAGNOSIS — Z00.00 ENCOUNTER FOR SUBSEQUENT ANNUAL WELLNESS VISIT (AWV) IN MEDICARE PATIENT: Primary | ICD-10-CM

## 2024-02-05 DIAGNOSIS — Z12.5 SCREENING FOR PROSTATE CANCER: ICD-10-CM

## 2024-02-05 DIAGNOSIS — E78.5 HYPERLIPIDEMIA, UNSPECIFIED HYPERLIPIDEMIA TYPE: ICD-10-CM

## 2024-02-05 DIAGNOSIS — R73.9 HYPERGLYCEMIA: ICD-10-CM

## 2024-02-05 PROCEDURE — 1159F MED LIST DOCD IN RCRD: CPT | Performed by: NURSE PRACTITIONER

## 2024-02-05 PROCEDURE — 96160 PT-FOCUSED HLTH RISK ASSMT: CPT | Performed by: NURSE PRACTITIONER

## 2024-02-05 PROCEDURE — 99397 PER PM REEVAL EST PAT 65+ YR: CPT | Performed by: NURSE PRACTITIONER

## 2024-02-05 PROCEDURE — G0439 PPPS, SUBSEQ VISIT: HCPCS | Performed by: NURSE PRACTITIONER

## 2024-02-05 PROCEDURE — 3078F DIAST BP <80 MM HG: CPT | Performed by: NURSE PRACTITIONER

## 2024-02-05 PROCEDURE — 3074F SYST BP LT 130 MM HG: CPT | Performed by: NURSE PRACTITIONER

## 2024-02-05 PROCEDURE — 1160F RVW MEDS BY RX/DR IN RCRD: CPT | Performed by: NURSE PRACTITIONER

## 2024-02-05 RX ORDER — TRAZODONE HYDROCHLORIDE 50 MG/1
50 TABLET ORAL NIGHTLY
Qty: 30 TABLET | Refills: 1 | Status: SHIPPED | OUTPATIENT
Start: 2024-02-05

## 2024-02-05 NOTE — PROGRESS NOTES
The ABCs of the Annual Wellness Visit  Subsequent Medicare Wellness Visit    Subjective    Nilay Odonnell is a 73 y.o. male who presents for a Subsequent Medicare Wellness Visit.    The following portions of the patient's history were reviewed and   updated as appropriate: allergies, current medications, past family history, past medical history, past social history, past surgical history, and problem list.    Compared to one year ago, the patient feels his physical   health is the same.    Compared to one year ago, the patient feels his mental   health is the same.    Recent Hospitalizations:  He was not admitted to the hospital during the last year.       Current Medical Providers:  Patient Care Team:  India Jerome APRN as PCP - General (Family Medicine)  Kenneth Hinojosa MD as Consulting Physician (General Surgery)    Outpatient Medications Prior to Visit   Medication Sig Dispense Refill    bisoprolol (ZEBeta) 5 MG tablet Take 1 tablet by mouth Daily. 90 tablet 1    lisinopril (PRINIVIL,ZESTRIL) 10 MG tablet TAKE 1 TABLET BY MOUTH EVERY DAY 90 tablet 3    melatonin 1 MG tablet Take  by mouth.      nitroglycerin (NITROSTAT) 0.4 MG SL tablet Place 1 tablet under the tongue Every 5 (Five) Minutes As Needed for Chest Pain. Take no more than 3 doses in 15 minutes. 30 tablet 12    pravastatin (PRAVACHOL) 80 MG tablet TAKE 1 TABLET BY MOUTH EVERYDAY AT BEDTIME 90 tablet 3    carbamide peroxide (Debrox) 6.5 % otic solution Administer 5 drops into both ears As Needed for Ear Pain. 22 mL 2     No facility-administered medications prior to visit.       No opioid medication identified on active medication list. I have reviewed chart for other potential  high risk medication/s and harmful drug interactions in the elderly.        Aspirin is not on active medication list.  Aspirin use is not indicated based on review of current medical condition/s. Risk of harm outweighs potential benefits.  .    Patient Active  "Problem List   Diagnosis    Coronary artery disease involving native coronary artery of native heart    HTN (hypertension)    Dyslipidemia    Gastrointestinal hemorrhage    Thrombocytosis    Acute blood loss anemia    Nodule of colon    Duodenal ulcer    Colon cancer screening    History of colon cancer     Advance Care Planning   Advance Care Planning     Advance Directive is not on file.  ACP discussion was held with the patient during this visit. Patient does not have an advance directive, information provided.     Objective    Vitals:    24 1326   BP: 108/64   Pulse: 76   Temp: 98.8 °F (37.1 °C)   SpO2: 96%   Weight: 102 kg (224 lb)   Height: 180.3 cm (70.98\")   PainSc: 0-No pain     Estimated body mass index is 31.26 kg/m² as calculated from the following:    Height as of this encounter: 180.3 cm (70.98\").    Weight as of this encounter: 102 kg (224 lb).    BMI is >= 30 and <35. (Class 1 Obesity). The following options were offered after discussion;: exercise counseling/recommendations and nutrition counseling/recommendations      Does the patient have evidence of cognitive impairment? No    Lab Results   Component Value Date    CHLPL 161 2024    TRIG 162 (H) 2024    HDL 38 (L) 2024    LDL 95 2024    VLDL 28 2024    HGBA1C 6.00 (H) 2024        HEALTH RISK ASSESSMENT    Smoking Status:  Social History     Tobacco Use   Smoking Status Never   Smokeless Tobacco Former    Types: Snuff    Quit date: 2020     Alcohol Consumption:  Social History     Substance and Sexual Activity   Alcohol Use No     Fall Risk Screen:    STEADI Fall Risk Assessment was completed, and patient is at MODERATE risk for falls. Assessment completed on:2024    Depression Screenin/5/2024     1:27 PM   PHQ-2/PHQ-9 Depression Screening   Little Interest or Pleasure in Doing Things 0-->not at all   Feeling Down, Depressed or Hopeless 0-->not at all   PHQ-9: Brief Depression Severity " Measure Score 0       Health Habits and Functional and Cognitive Screenin/5/2024     1:31 PM   Functional & Cognitive Status   Do you have difficulty preparing food and eating? No   Do you have difficulty bathing yourself, getting dressed or grooming yourself? No   Do you have difficulty using the toilet? No   Do you have difficulty moving around from place to place? No   Do you have trouble with steps or getting out of a bed or a chair? No   Current Diet Unhealthy Diet   Dental Exam Up to date   Eye Exam Not up to date   Exercise (times per week) 7 times per week   Current Exercises Include Treadmill   Do you need help using the phone?  No   Are you deaf or do you have serious difficulty hearing?  No   Do you need help to go to places out of walking distance? No   Do you need help shopping? No   Do you need help preparing meals?  No   Do you need help with housework?  No   Do you need help with laundry? No   Do you need help taking your medications? No   Do you need help managing money? No   Do you ever drive or ride in a car without wearing a seat belt? No   Have you felt unusual stress, anger or loneliness in the last month? No   Who do you live with? Alone   If you need help, do you have trouble finding someone available to you? No   Have you been bothered in the last four weeks by sexual problems? No   Do you have difficulty concentrating, remembering or making decisions? No       Age-appropriate Screening Schedule:  Refer to the list below for future screening recommendations based on patient's age, sex and/or medical conditions. Orders for these recommended tests are listed in the plan section. The patient has been provided with a written plan.    Health Maintenance   Topic Date Due    RSV Vaccine - Adults (1 - 1-dose 60+ series) Never done    INFLUENZA VACCINE  2024 (Originally 2023)    COVID-19 Vaccine (2023- season) 2024 (Originally 2023)    Pneumococcal Vaccine 65+ (1 of  "1 - PCV) 02/05/2025 (Originally 4/4/2014)    TDAP/TD VACCINES (1 - Tdap) 02/05/2030 (Originally 4/4/1969)    ANNUAL WELLNESS VISIT  02/05/2025    BMI FOLLOWUP  02/05/2025    LIPID PANEL  02/12/2025    HEPATITIS C SCREENING  Completed    ZOSTER VACCINE  Completed    COLONOSCOPY  Discontinued                  CMS Preventative Services Quick Reference  Risk Factors Identified During Encounter  Hearing Problem:  referrals declined   Immunizations Discussed/Encouraged: Influenza and Prevnar 20 (Pneumococcal 20-valent conjugate)  Inactivity/Sedentary: Patient was advised to exercise at least 150 minutes a week per CDC recommendations.  The above risks/problems have been discussed with the patient.  Pertinent information has been shared with the patient in the After Visit Summary.  An After Visit Summary and PPPS were made available to the patient.    Follow Up:   Next Medicare Wellness visit to be scheduled in 1 year.       Additional E&M Note during same encounter follows:  Patient has multiple medical problems which are significant and separately identifiable that require additional work above and beyond the Medicare Wellness Visit.      Chief Complaint  Medicare Wellness-subsequent    Subjective        HPI  Nilay Odonnell is also being seen today for dysphagia.  Recently choked on dinner.  Voice goes in and out.  He does not have reflux symptoms. Voice is hoarse at times. Denies weight change, temperature intolerance, hair/skin/nail changes, bowel habit changes, palpitations, anxiety, sore throat.     Review of Systems   All other systems reviewed and are negative.      Objective   Vital Signs:  /64   Pulse 76   Temp 98.8 °F (37.1 °C)   Ht 180.3 cm (70.98\")   Wt 102 kg (224 lb)   SpO2 96%   BMI 31.26 kg/m²     Physical Exam  Constitutional:       Appearance: He is well-developed. He is not ill-appearing.   HENT:      Head: Normocephalic.      Right Ear: Tympanic membrane, ear canal and external ear " normal.      Left Ear: Tympanic membrane, ear canal and external ear normal.      Nose: Nose normal.      Mouth/Throat:      Mouth: Mucous membranes are moist.      Pharynx: Oropharynx is clear. Uvula midline.   Eyes:      Extraocular Movements: Extraocular movements intact.      Conjunctiva/sclera: Conjunctivae normal.      Pupils: Pupils are equal, round, and reactive to light.   Neck:      Thyroid: No thyromegaly.      Vascular: No carotid bruit.   Cardiovascular:      Rate and Rhythm: Normal rate and regular rhythm.      Pulses:           Radial pulses are 2+ on the right side and 2+ on the left side.        Dorsalis pedis pulses are 2+ on the right side and 2+ on the left side.      Heart sounds: Normal heart sounds.   Pulmonary:      Effort: Pulmonary effort is normal.      Breath sounds: Normal breath sounds.   Abdominal:      General: Bowel sounds are normal.      Palpations: Abdomen is soft.      Tenderness: There is no abdominal tenderness.   Musculoskeletal:         General: No tenderness or deformity. Normal range of motion.      Cervical back: Full passive range of motion without pain, normal range of motion and neck supple.      Right lower leg: No edema.      Left lower leg: No edema.   Lymphadenopathy:      Cervical: No cervical adenopathy.   Skin:     General: Skin is warm.      Capillary Refill: Capillary refill takes less than 2 seconds.   Neurological:      Mental Status: He is alert and oriented to person, place, and time.      Sensory: No sensory deficit.      Coordination: Coordination normal.      Gait: Gait normal.      Comments: CN II-XII normal   Psychiatric:         Attention and Perception: Attention normal.         Mood and Affect: Mood and affect normal.         Speech: Speech normal.         Behavior: Behavior normal.         Thought Content: Thought content normal.                         Assessment and Plan   Diagnoses and all orders for this visit:    1. Encounter for subsequent  annual wellness visit (AWV) in Medicare patient (Primary)    2. Annual physical exam    3. Insomnia, unspecified type  -     traZODone (DESYREL) 50 MG tablet; Take 1 tablet by mouth Every Night.  Dispense: 30 tablet; Refill: 1        - Encouraged to take part in daily physical exercise.          - Eat healthy, well balanced diet; avoid sugary foods or beverages        - Abstain from alcohol and drugs        - Create calm space in bedroom, avoid screen time 1-2 hours before bed, no caffeine after 5 pm    4. Hyperlipidemia, unspecified hyperlipidemia type  -     Lipid Panel        - Follow heart healthy diet.  Keep sodium intake < 1500 mg per day.  Avoid processed & fast foods.          - Exercise as tolerated, with a goal of 30 minutes of moderate exercise most days.         - Take medications as prescribed.    5. Essential hypertension  -     Comprehensive Metabolic Panel        - Follow heart healthy diet.  Keep sodium intake < 1500 mg per day.  Avoid processed & fast foods.          - Exercise as tolerated, with a goal of 30 minutes of moderate exercise most days.         - Take medications as prescribed.    6. Coronary artery disease involving native coronary artery of native heart without angina pectoris        - Follow heart healthy diet.  Keep sodium intake < 1500 mg per day.  Avoid processed & fast foods.          - Exercise as tolerated, with a goal of 30 minutes of moderate exercise most days.         - Take medications as prescribed.    7. Screening for disorder of blood and blood-forming organs  -     CBC & Differential    8. Screening for prostate cancer  -     PSA SCREENING    9. Hyperglycemia  -     Hemoglobin A1c    10. History of colon cancer  -     CEA    11. Change in voice  -     T4, Free  -     TSH  -     Thyroid Antibodies    12. Other fatigue  -     T4, Free  -     TSH  -     Thyroid Antibodies    Counseling/anticipatory guidance/risk factor interventions for age provided. See AVS for further  information.           Follow Up   Return in about 4 weeks (around 3/4/2024) for Next scheduled follow up.  Patient was given instructions and counseling regarding his condition or for health maintenance advice. Please see specific information pulled into the AVS if appropriate.

## 2024-02-13 LAB
ALBUMIN SERPL-MCNC: 4 G/DL (ref 3.5–5.2)
ALBUMIN/GLOB SERPL: 1.7 G/DL
ALP SERPL-CCNC: 65 U/L (ref 39–117)
ALT SERPL-CCNC: 29 U/L (ref 1–41)
AST SERPL-CCNC: 21 U/L (ref 1–40)
BASOPHILS # BLD AUTO: 0.05 10*3/MM3 (ref 0–0.2)
BASOPHILS NFR BLD AUTO: 0.6 % (ref 0–1.5)
BILIRUB SERPL-MCNC: 0.4 MG/DL (ref 0–1.2)
BUN SERPL-MCNC: 13 MG/DL (ref 8–23)
BUN/CREAT SERPL: 11.5 (ref 7–25)
CALCIUM SERPL-MCNC: 8.8 MG/DL (ref 8.6–10.5)
CEA SERPL-MCNC: 1.1 NG/ML
CHLORIDE SERPL-SCNC: 107 MMOL/L (ref 98–107)
CHOLEST SERPL-MCNC: 161 MG/DL (ref 0–200)
CO2 SERPL-SCNC: 17.8 MMOL/L (ref 22–29)
CREAT SERPL-MCNC: 1.13 MG/DL (ref 0.76–1.27)
EGFRCR SERPLBLD CKD-EPI 2021: 68.2 ML/MIN/1.73
EOSINOPHIL # BLD AUTO: 0.6 10*3/MM3 (ref 0–0.4)
EOSINOPHIL NFR BLD AUTO: 7.2 % (ref 0.3–6.2)
ERYTHROCYTE [DISTWIDTH] IN BLOOD BY AUTOMATED COUNT: 12.3 % (ref 12.3–15.4)
GLOBULIN SER CALC-MCNC: 2.4 GM/DL
GLUCOSE SERPL-MCNC: 119 MG/DL (ref 65–99)
HBA1C MFR BLD: 6 % (ref 4.8–5.6)
HCT VFR BLD AUTO: 49 % (ref 37.5–51)
HDLC SERPL-MCNC: 38 MG/DL (ref 40–60)
HGB BLD-MCNC: 16.7 G/DL (ref 13–17.7)
IMM GRANULOCYTES # BLD AUTO: 0.09 10*3/MM3 (ref 0–0.05)
IMM GRANULOCYTES NFR BLD AUTO: 1.1 % (ref 0–0.5)
LDLC SERPL CALC-MCNC: 95 MG/DL (ref 0–100)
LYMPHOCYTES # BLD AUTO: 2.03 10*3/MM3 (ref 0.7–3.1)
LYMPHOCYTES NFR BLD AUTO: 24.2 % (ref 19.6–45.3)
MCH RBC QN AUTO: 31.6 PG (ref 26.6–33)
MCHC RBC AUTO-ENTMCNC: 34.1 G/DL (ref 31.5–35.7)
MCV RBC AUTO: 92.8 FL (ref 79–97)
MONOCYTES # BLD AUTO: 0.5 10*3/MM3 (ref 0.1–0.9)
MONOCYTES NFR BLD AUTO: 6 % (ref 5–12)
NEUTROPHILS # BLD AUTO: 5.11 10*3/MM3 (ref 1.7–7)
NEUTROPHILS NFR BLD AUTO: 60.9 % (ref 42.7–76)
NRBC BLD AUTO-RTO: 0 /100 WBC (ref 0–0.2)
PLATELET # BLD AUTO: 184 10*3/MM3 (ref 140–450)
POTASSIUM SERPL-SCNC: 5.2 MMOL/L (ref 3.5–5.2)
PROT SERPL-MCNC: 6.4 G/DL (ref 6–8.5)
PSA SERPL-MCNC: 2.03 NG/ML (ref 0–4)
RBC # BLD AUTO: 5.28 10*6/MM3 (ref 4.14–5.8)
SODIUM SERPL-SCNC: 138 MMOL/L (ref 136–145)
T4 FREE SERPL-MCNC: 1.06 NG/DL (ref 0.93–1.7)
THYROGLOB AB SERPL-ACNC: <1 IU/ML (ref 0–0.9)
THYROPEROXIDASE AB SERPL-ACNC: <9 IU/ML (ref 0–34)
TRIGL SERPL-MCNC: 162 MG/DL (ref 0–150)
TSH SERPL DL<=0.005 MIU/L-ACNC: 2.78 UIU/ML (ref 0.27–4.2)
VLDLC SERPL CALC-MCNC: 28 MG/DL (ref 5–40)
WBC # BLD AUTO: 8.38 10*3/MM3 (ref 3.4–10.8)

## 2024-02-14 ENCOUNTER — TELEPHONE (OUTPATIENT)
Dept: INTERNAL MEDICINE | Facility: CLINIC | Age: 74
End: 2024-02-14
Payer: MEDICARE

## 2024-02-14 NOTE — TELEPHONE ENCOUNTER
"Relay     \"  Triglycerides slightly elevated, HDL (good) cholesterol a bit low.  Follow low-carb heart healthy diet such as Mediterranean diet.  Be physically active most days of the week as tolerated.  Hemoglobin A1c 6, continues to be very well-managed.  Remainder of labs.\"        "

## 2024-03-01 DIAGNOSIS — G47.00 INSOMNIA, UNSPECIFIED TYPE: ICD-10-CM

## 2024-03-01 RX ORDER — TRAZODONE HYDROCHLORIDE 50 MG/1
50 TABLET ORAL NIGHTLY
Qty: 90 TABLET | Refills: 1 | Status: SHIPPED | OUTPATIENT
Start: 2024-03-01

## 2024-03-05 ENCOUNTER — OFFICE VISIT (OUTPATIENT)
Dept: INTERNAL MEDICINE | Facility: CLINIC | Age: 74
End: 2024-03-05
Payer: MEDICARE

## 2024-03-05 VITALS
DIASTOLIC BLOOD PRESSURE: 56 MMHG | TEMPERATURE: 98.2 F | HEART RATE: 76 BPM | OXYGEN SATURATION: 97 % | BODY MASS INDEX: 30.66 KG/M2 | HEIGHT: 71 IN | WEIGHT: 219 LBS | SYSTOLIC BLOOD PRESSURE: 106 MMHG

## 2024-03-05 DIAGNOSIS — I10 ESSENTIAL HYPERTENSION: ICD-10-CM

## 2024-03-05 DIAGNOSIS — I25.10 CORONARY ARTERY DISEASE INVOLVING NATIVE CORONARY ARTERY OF NATIVE HEART WITHOUT ANGINA PECTORIS: ICD-10-CM

## 2024-03-05 DIAGNOSIS — E78.5 HYPERLIPIDEMIA, UNSPECIFIED HYPERLIPIDEMIA TYPE: ICD-10-CM

## 2024-03-05 DIAGNOSIS — G47.00 INSOMNIA, UNSPECIFIED TYPE: Primary | ICD-10-CM

## 2024-03-05 PROCEDURE — 3074F SYST BP LT 130 MM HG: CPT | Performed by: NURSE PRACTITIONER

## 2024-03-05 PROCEDURE — 1159F MED LIST DOCD IN RCRD: CPT | Performed by: NURSE PRACTITIONER

## 2024-03-05 PROCEDURE — 1160F RVW MEDS BY RX/DR IN RCRD: CPT | Performed by: NURSE PRACTITIONER

## 2024-03-05 PROCEDURE — G2211 COMPLEX E/M VISIT ADD ON: HCPCS | Performed by: NURSE PRACTITIONER

## 2024-03-05 PROCEDURE — 99214 OFFICE O/P EST MOD 30 MIN: CPT | Performed by: NURSE PRACTITIONER

## 2024-03-05 PROCEDURE — 3078F DIAST BP <80 MM HG: CPT | Performed by: NURSE PRACTITIONER

## 2024-03-05 RX ORDER — TRAZODONE HYDROCHLORIDE 100 MG/1
100 TABLET ORAL NIGHTLY
Qty: 90 TABLET | Refills: 3 | Status: SHIPPED | OUTPATIENT
Start: 2024-03-05

## 2024-03-05 NOTE — PROGRESS NOTES
"     Office Visit      Patient Name: Nilay Odonnell  : 1950   MRN: [id number]     Chief Complaint:    Chief Complaint   Patient presents with    Hypertension    Insomnia       History of Present Illness: Nilay Odonnell is a 73 y.o. male who is here today for follow up of HTN, hyperlipidemia: Insomnia.  He has purposefully lost 5 pounds in the past month.  He is cooking with air fryer, eating healthier.  Blood pressure is a bit low.  Taking bisoprolol 5 mg, lisinopril 10 mg statin 80 mg as prescribed.  Does not monitor blood pressure at home. Denies chest pain, dyspnea, orthopnea, palpitations, lower extremity edema, confusion, headaches, weakness, visual disturbances.    Takes melatonin when he goes to bed.  Wakes up in a couple of hours, takes trazodone 50 mg. Tried taking trazodone at bedtime, did not work well.  Does not snore.      Subjective      I have reviewed and the following portions of the patient's history were updated as appropriate: past family history, past medical history, past social history, past surgical history and problem list.      Current Outpatient Medications:     traZODone (DESYREL) 100 MG tablet, Take 1 tablet by mouth Every Night., Disp: 90 tablet, Rfl: 3    bisoprolol (ZEBeta) 5 MG tablet, Take 1 tablet by mouth Daily., Disp: 90 tablet, Rfl: 1    melatonin 1 MG tablet, Take  by mouth., Disp: , Rfl:     nitroglycerin (NITROSTAT) 0.4 MG SL tablet, Place 1 tablet under the tongue Every 5 (Five) Minutes As Needed for Chest Pain. Take no more than 3 doses in 15 minutes., Disp: 30 tablet, Rfl: 12    pravastatin (PRAVACHOL) 80 MG tablet, TAKE 1 TABLET BY MOUTH EVERYDAY AT BEDTIME, Disp: 90 tablet, Rfl: 3    Allergies   Allergen Reactions    Losartan Rash       Objective     Physical Exam:  Vital Signs:   Vitals:    24 1028   BP: 106/56   Pulse: 76   Temp: 98.2 °F (36.8 °C)   SpO2: 97%   Weight: 99.3 kg (219 lb)   Height: 180.3 cm (70.98\")     Body mass index is 30.56 " kg/m².         Physical Exam  Constitutional:       Appearance: He is not ill-appearing.   HENT:      Head: Normocephalic.      Right Ear: External ear normal.      Left Ear: External ear normal.   Eyes:      Conjunctiva/sclera: Conjunctivae normal.      Pupils: Pupils are equal, round, and reactive to light.   Cardiovascular:      Rate and Rhythm: Normal rate and regular rhythm.      Pulses:           Radial pulses are 2+ on the right side and 2+ on the left side.        Dorsalis pedis pulses are 2+ on the right side and 2+ on the left side.      Heart sounds: Normal heart sounds.   Pulmonary:      Effort: Pulmonary effort is normal.      Breath sounds: Normal breath sounds.   Musculoskeletal:      Cervical back: Normal range of motion and neck supple.   Skin:     General: Skin is warm.      Capillary Refill: Capillary refill takes less than 2 seconds.   Neurological:      Mental Status: He is alert and oriented to person, place, and time.      Coordination: Coordination normal.      Gait: Gait normal.   Psychiatric:         Attention and Perception: Attention normal.         Mood and Affect: Mood and affect normal.         Speech: Speech normal.         Behavior: Behavior normal.         Assessment / Plan      Assessment/Plan:   Diagnoses and all orders for this visit:    1. Insomnia, unspecified type (Primary)  -     traZODone (DESYREL) 100 MG tablet; Take 1 tablet by mouth Every Night.  Dispense: 90 tablet; Refill: 3.  Increasing from 50 mg daily.         - Encouraged to take part in daily physical exercise.          - Eat healthy, well balanced diet; avoid sugary foods or beverages        - Continue to abstain from alcohol and drugs        - Create calm space in bedroom, avoid screen time 1-2 hours before bed, no caffeine after 5 pm    2. Hyperlipidemia, unspecified hyperlipidemia type        - Interested in stopping pravastatin.  Discussed taking plant sterol supplement instead.  Will continue making healthy  diet changes.    3. Essential hypertension  4. Coronary artery disease involving native coronary artery of native heart without angina pectoris        - Follow heart healthy diet.  Keep sodium intake < 1500 mg per day.  Avoid processed & fast foods.          - Exercise as tolerated, with a goal of 30 minutes of moderate exercise most days.         - Take medications as prescribed.           Follow Up:   Return in about 6 months (around 9/5/2024) for Next scheduled follow up.    Patient was given instructions and counseling regarding his condition or for health maintenance advice. Please see specific information pulled into the AVS if appropriate.       [facility]     Please note that portions of this note may have been completed with a voice recognition program. Efforts were made to edit dictation, but occasionally words are mistranscribed.

## 2024-03-19 ENCOUNTER — CLINICAL SUPPORT (OUTPATIENT)
Dept: INTERNAL MEDICINE | Facility: CLINIC | Age: 74
End: 2024-03-19
Payer: MEDICARE

## 2024-03-19 VITALS — DIASTOLIC BLOOD PRESSURE: 68 MMHG | SYSTOLIC BLOOD PRESSURE: 124 MMHG

## 2024-04-11 DIAGNOSIS — I10 ESSENTIAL HYPERTENSION: ICD-10-CM

## 2024-04-11 RX ORDER — BISOPROLOL FUMARATE 5 MG/1
5 TABLET, FILM COATED ORAL DAILY
Qty: 90 TABLET | Refills: 1 | Status: SHIPPED | OUTPATIENT
Start: 2024-04-11

## 2025-01-08 DIAGNOSIS — I10 ESSENTIAL HYPERTENSION: ICD-10-CM

## 2025-01-08 RX ORDER — BISOPROLOL FUMARATE 5 MG/1
5 TABLET, FILM COATED ORAL DAILY
Qty: 90 TABLET | Refills: 2 | Status: SHIPPED | OUTPATIENT
Start: 2025-01-08

## 2025-01-08 NOTE — TELEPHONE ENCOUNTER
Rx Refill Note  Requested Prescriptions     Pending Prescriptions Disp Refills    bisoprolol (ZEBeta) 5 MG tablet [Pharmacy Med Name: BISOPROLOL FUMARATE 5 MG TAB] 90 tablet 2     Sig: TAKE 1 TABLET BY MOUTH EVERY DAY      Last office visit with prescribing clinician: 3/5/2024   Last telemedicine visit with prescribing clinician: Visit date not found   Next office visit with prescribing clinician: Visit date not found                         Would you like a call back once the refill request has been completed: [] Yes [] No    If the office needs to give you a call back, can they leave a voicemail: [] Yes [] No    Trini Norman, [facility]  01/08/25, 08:21 EST

## 2025-01-25 DIAGNOSIS — E78.5 HYPERLIPIDEMIA, UNSPECIFIED HYPERLIPIDEMIA TYPE: ICD-10-CM

## 2025-01-27 RX ORDER — PRAVASTATIN SODIUM 80 MG/1
80 TABLET ORAL
Qty: 90 TABLET | Refills: 3 | Status: SHIPPED | OUTPATIENT
Start: 2025-01-27

## 2025-04-18 ENCOUNTER — OFFICE VISIT (OUTPATIENT)
Dept: INTERNAL MEDICINE | Facility: CLINIC | Age: 75
End: 2025-04-18
Payer: COMMERCIAL

## 2025-04-18 VITALS
BODY MASS INDEX: 28.98 KG/M2 | SYSTOLIC BLOOD PRESSURE: 110 MMHG | DIASTOLIC BLOOD PRESSURE: 72 MMHG | WEIGHT: 207 LBS | HEIGHT: 71 IN | TEMPERATURE: 98.2 F | HEART RATE: 79 BPM | OXYGEN SATURATION: 96 %

## 2025-04-18 DIAGNOSIS — E78.5 DYSLIPIDEMIA: ICD-10-CM

## 2025-04-18 DIAGNOSIS — Z00.00 ANNUAL PHYSICAL EXAM: ICD-10-CM

## 2025-04-18 DIAGNOSIS — R73.9 HYPERGLYCEMIA: ICD-10-CM

## 2025-04-18 DIAGNOSIS — I25.10 CORONARY ARTERY DISEASE INVOLVING NATIVE CORONARY ARTERY OF NATIVE HEART WITHOUT ANGINA PECTORIS: ICD-10-CM

## 2025-04-18 DIAGNOSIS — I10 PRIMARY HYPERTENSION: ICD-10-CM

## 2025-04-18 DIAGNOSIS — Z00.00 ENCOUNTER FOR SUBSEQUENT ANNUAL WELLNESS VISIT (AWV) IN MEDICARE PATIENT: Primary | ICD-10-CM

## 2025-04-18 RX ORDER — NITROGLYCERIN 0.4 MG/1
0.4 TABLET SUBLINGUAL
Qty: 30 TABLET | Refills: 0 | Status: SHIPPED | OUTPATIENT
Start: 2025-04-18

## 2025-04-18 NOTE — ASSESSMENT & PLAN NOTE
Orders:    nitroglycerin (Nitrostat) 0.4 MG SL tablet; Place 1 tablet under the tongue Every 5 (Five) Minutes As Needed for Chest Pain. Take no more than 3 doses in 15 minutes.    Comprehensive Metabolic Panel

## 2025-04-18 NOTE — PROGRESS NOTES
Subjective   The ABCs of the Annual Wellness Visit  Medicare Wellness Visit      Nilay Odonnell is a 75 y.o. patient who presents for a Medicare Wellness Visit.    The following portions of the patient's history were reviewed and   updated as appropriate: allergies, current medications, past family history, past medical history, past social history, past surgical history, and problem list.    Compared to one year ago, the patient's physical   health is the same.  Compared to one year ago, the patient's mental   health is the same.    Recent Hospitalizations:  He was not admitted to the hospital during the last year.     Current Medical Providers:  Patient Care Team:  India Jerome APRN as PCP - General (Family Medicine)  Kenneth Hinojosa MD as Consulting Physician (General Surgery)    Outpatient Medications Prior to Visit   Medication Sig Dispense Refill    bisoprolol (ZEBeta) 5 MG tablet TAKE 1 TABLET BY MOUTH EVERY DAY 90 tablet 2    pravastatin (PRAVACHOL) 80 MG tablet TAKE 1 TABLET BY MOUTH EVERYDAY AT BEDTIME 90 tablet 3    melatonin 1 MG tablet Take  by mouth.      nitroglycerin (NITROSTAT) 0.4 MG SL tablet Place 1 tablet under the tongue Every 5 (Five) Minutes As Needed for Chest Pain. Take no more than 3 doses in 15 minutes. 30 tablet 12    traZODone (DESYREL) 100 MG tablet Take 1 tablet by mouth Every Night. 90 tablet 3     No facility-administered medications prior to visit.     No opioid medication identified on active medication list. I have reviewed chart for other potential  high risk medication/s and harmful drug interactions in the elderly.      Aspirin is not on active medication list.  Aspirin use is not indicated based on review of current medical condition/s. Risk of harm outweighs potential benefits.  .    Patient Active Problem List   Diagnosis    Coronary artery disease involving native coronary artery of native heart    HTN (hypertension)    Dyslipidemia    Gastrointestinal  "hemorrhage    Thrombocytosis    Acute blood loss anemia    Nodule of colon    Duodenal ulcer    Colon cancer screening    History of colon cancer     Advance Care Planning Advance Directive is not on file.  ACP discussion was held with the patient during this visit. Patient does not have an advance directive, information provided.            Objective   Vitals:    25 1021   BP: 110/72   Pulse: 79   Temp: 98.2 °F (36.8 °C)   SpO2: 96%   Weight: 93.9 kg (207 lb)   Height: 180.3 cm (70.98\")   PainSc: 0-No pain       Estimated body mass index is 28.88 kg/m² as calculated from the following:    Height as of this encounter: 180.3 cm (70.98\").    Weight as of this encounter: 93.9 kg (207 lb).    BMI is >= 25 and <30. (Overweight) The following options were offered after discussion;: exercise counseling/recommendations and nutrition counseling/recommendations           Does the patient have evidence of cognitive impairment? No  Lab Results   Component Value Date    CHLPL 157 2025    TRIG 112 2025    HDL 35 (L) 2025     (H) 2025    VLDL 21 2025    HGBA1C 5.70 (H) 2025                                                                                                Health  Risk Assessment    Smoking Status:  Social History     Tobacco Use   Smoking Status Never   Smokeless Tobacco Former    Types: Snuff    Quit date: 2020     Alcohol Consumption:  Social History     Substance and Sexual Activity   Alcohol Use No       Fall Risk Screen  STEADI Fall Risk Assessment was completed, and patient is at LOW risk for falls.Assessment completed on:2025    Depression Screening   Little interest or pleasure in doing things? Not at all   Feeling down, depressed, or hopeless? Not at all   PHQ-2 Total Score 0      Health Habits and Functional and Cognitive Screenin/18/2025    10:29 AM   Functional & Cognitive Status   Do you have difficulty preparing food and eating? No   Do " you have difficulty bathing yourself, getting dressed or grooming yourself? No   Do you have difficulty using the toilet? No   Do you have difficulty moving around from place to place? No   Do you have trouble with steps or getting out of a bed or a chair? No   Current Diet Well Balanced Diet   Dental Exam Not up to date   Eye Exam Not up to date   Exercise (times per week) 7 times per week   Current Exercises Include Treadmill;Walking   Do you need help using the phone?  No   Are you deaf or do you have serious difficulty hearing?  No   Do you need help to go to places out of walking distance? No   Do you need help shopping? No   Do you need help preparing meals?  No   Do you need help with housework?  No   Do you need help with laundry? No   Do you need help taking your medications? No   Do you need help managing money? No   Do you ever drive or ride in a car without wearing a seat belt? No   Have you felt unusual stress, anger or loneliness in the last month? No   Who do you live with? Alone   If you need help, do you have trouble finding someone available to you? No   Have you been bothered in the last four weeks by sexual problems? No   Do you have difficulty concentrating, remembering or making decisions? No           Age-appropriate Screening Schedule:  Refer to the list below for future screening recommendations based on patient's age, sex and/or medical conditions. Orders for these recommended tests are listed in the plan section. The patient has been provided with a written plan.    Health Maintenance List  Health Maintenance   Topic Date Due    ANNUAL PHYSICAL  02/05/2025    Pneumococcal Vaccine 50+ (1 of 1 - PCV) 10/15/2025 (Originally 4/4/1999)    COVID-19 Vaccine (5 - 2024-25 season) 04/18/2026 (Originally 9/1/2024)    RSV Vaccine - Adults (1 - 1-dose 75+ series) 04/18/2026 (Originally 4/4/2024)    TDAP/TD VACCINES (1 - Tdap) 02/05/2030 (Originally 4/4/1969)    INFLUENZA VACCINE  07/01/2025    LIPID  "PANEL  04/24/2026    HEPATITIS C SCREENING  Completed    ZOSTER VACCINE  Completed    COLORECTAL CANCER SCREENING  Discontinued                                                                                                                                                CMS Preventative Services Quick Reference  Risk Factors Identified During Encounter  Immunizations Discussed/Encouraged: Prevnar 20 (Pneumococcal 20-valent conjugate) and RSV (Respiratory Syncytial Virus)    The above risks/problems have been discussed with the patient.  Pertinent information has been shared with the patient in the After Visit Summary.  An After Visit Summary and PPPS were made available to the patient.    Follow Up:   Next Medicare Wellness visit to be scheduled in 1 year.         Additional E&M Note during same encounter follows:  Patient has additional, significant, and separately identifiable condition(s)/problem(s) that require work above and beyond the Medicare Wellness Visit     Chief Complaint  Medicare Wellness-subsequent    Subjective   HPI  Mervin is also being seen today for an annual adult preventative physical exam.     HTN, hyperlipidemia, CAD.  Taking medications as prescribed.  Denies chest pain, dyspnea, orthopnea, palpitations, lower extremity edema, confusion, headaches, weakness, visual disturbances.    Insomnia.  No longer taking trazodone, melatonin. Sleeping fairly well without medication. Not interested in trying something different. Does not believe he snores. Wakes up feeling refreshed most of the time.  Does not nap throughout the day.          Objective   Vital Signs:  /72   Pulse 79   Temp 98.2 °F (36.8 °C)   Ht 180.3 cm (70.98\")   Wt 93.9 kg (207 lb)   SpO2 96%   BMI 28.88 kg/m²   Physical Exam               Assessment and Plan      Coronary artery disease involving native coronary artery of native heart without angina pectoris      Orders:    nitroglycerin (Nitrostat) 0.4 MG SL tablet; Place " 1 tablet under the tongue Every 5 (Five) Minutes As Needed for Chest Pain. Take no more than 3 doses in 15 minutes.    Comprehensive Metabolic Panel    Dyslipidemia    Orders:    Lipid Panel    Primary hypertension      Orders:    Comprehensive Metabolic Panel    Lipid Panel    Encounter for subsequent annual wellness visit (AWV) in Medicare patient         Annual physical exam         Hyperglycemia    Orders:    Hemoglobin A1c            Follow Up   Return in about 1 year (around 4/18/2026) for Annual.  Patient was given instructions and counseling regarding his condition or for health maintenance advice. Please see specific information pulled into the AVS if appropriate.

## 2025-04-25 LAB
ALBUMIN SERPL-MCNC: 4 G/DL (ref 3.5–5.2)
ALBUMIN/GLOB SERPL: 1.8 G/DL
ALP SERPL-CCNC: 74 U/L (ref 39–117)
ALT SERPL-CCNC: 16 U/L (ref 1–41)
AST SERPL-CCNC: 17 U/L (ref 1–40)
BILIRUB SERPL-MCNC: 0.5 MG/DL (ref 0–1.2)
BUN SERPL-MCNC: 13 MG/DL (ref 8–23)
BUN/CREAT SERPL: 12.1 (ref 7–25)
CALCIUM SERPL-MCNC: 8.6 MG/DL (ref 8.6–10.5)
CHLORIDE SERPL-SCNC: 107 MMOL/L (ref 98–107)
CHOLEST SERPL-MCNC: 157 MG/DL (ref 0–200)
CO2 SERPL-SCNC: 25.9 MMOL/L (ref 22–29)
CREAT SERPL-MCNC: 1.07 MG/DL (ref 0.76–1.27)
EGFRCR SERPLBLD CKD-EPI 2021: 71.9 ML/MIN/1.73
GLOBULIN SER CALC-MCNC: 2.2 GM/DL
GLUCOSE SERPL-MCNC: 99 MG/DL (ref 65–99)
HBA1C MFR BLD: 5.7 % (ref 4.8–5.6)
HDLC SERPL-MCNC: 35 MG/DL (ref 40–60)
LDLC SERPL CALC-MCNC: 101 MG/DL (ref 0–100)
POTASSIUM SERPL-SCNC: 4.2 MMOL/L (ref 3.5–5.2)
PROT SERPL-MCNC: 6.2 G/DL (ref 6–8.5)
SODIUM SERPL-SCNC: 141 MMOL/L (ref 136–145)
TRIGL SERPL-MCNC: 112 MG/DL (ref 0–150)
VLDLC SERPL CALC-MCNC: 21 MG/DL (ref 5–40)

## (undated) DEVICE — BLD CLIP UNIV SURG GRY

## (undated) DEVICE — SYR LUER SLPTP 50ML

## (undated) DEVICE — MEDI-VAC NON-CONDUCTIVE SUCTION TUBING: Brand: CARDINAL HEALTH

## (undated) DEVICE — GLV SURG SENSICARE W/ALOE PF LF 7.5 STRL

## (undated) DEVICE — SUT SILK 2/0 SH 30IN K833H

## (undated) DEVICE — ENDOSCOPY PORT CONNECTOR FOR OLYMPUS® SCOPES: Brand: ERBE

## (undated) DEVICE — FRCP BX RADJAW4 NDL 2.8 240 STD OG

## (undated) DEVICE — SUT SILK 2/0 SUTUPAK TIES 24IN SA75H

## (undated) DEVICE — FRCP BIOP RADLJAW4 HOT 2.2X240 BX40

## (undated) DEVICE — TP SXN YANKR BULB STRL

## (undated) DEVICE — HYBRID TUBING/CAP SET FOR OLYMPUS® SCOPES: Brand: ERBE

## (undated) DEVICE — SUT VIC 3/0 SH 27IN J416H

## (undated) DEVICE — ENDOPATH XCEL BLADELESS TROCARS WITH STABILITY SLEEVES: Brand: ENDOPATH XCEL

## (undated) DEVICE — TOTAL TRAY, 16FR 10ML SIL FOLEY, URN: Brand: MEDLINE

## (undated) DEVICE — PENCL E/S HNDSWCH PUSHBTN HOLSTR 10FT

## (undated) DEVICE — SUT SILK 3/0 SH CR8 18IN C013D

## (undated) DEVICE — CVR HNDL LIGHT RIGID

## (undated) DEVICE — PROVIDES A STERILE INTERFACE BETWEEN THE OPERATING ROOM SURGICAL LAMPS (NON-STERILE) AND THE SURGEON OR NURSE (STERILE).: Brand: STERION®CLAMP COVER FABRIC

## (undated) DEVICE — VLV SXN AIR/H2O ORCAPOD3 1P/U STRL

## (undated) DEVICE — SLV SCD CALF HEMOFORCE DVT THERP REPR LG

## (undated) DEVICE — UNDYED BRAIDED (POLYGLACTIN 910), SYNTHETIC ABSORBABLE SUTURE: Brand: COATED VICRYL

## (undated) DEVICE — Device

## (undated) DEVICE — 2, DISPOSABLE SUCTION/IRRIGATOR WITHOUT DISPOSABLE TIP: Brand: STRYKEFLOW

## (undated) DEVICE — LUBE JELLY PK/2.75GM STRL BX/144

## (undated) DEVICE — GLV SURG TRIUMPH MICRO PF LTX 7.5 STRL

## (undated) DEVICE — SPNG GZ WOVN 4X4IN 12PLY 10/BX STRL

## (undated) DEVICE — SUT SILK 0 TIES 30IN A306H

## (undated) DEVICE — TUBING, SUCTION, 1/4" X 12', STRAIGHT: Brand: MEDLINE

## (undated) DEVICE — CONMED SCOPE SAVER BITE BLOCK, 20X27 MM: Brand: SCOPE SAVER

## (undated) DEVICE — PAD GRND REM POLYHESIVE A/ DISP

## (undated) DEVICE — SPNG LAP 18X18IN LF STRL PK/5

## (undated) DEVICE — ACCESS PLATFORM FOR MINIMALLY INVASIVE SURGERY.: Brand: GELPORT® LAPAROSCOPIC  SYSTEM

## (undated) DEVICE — ENDOPATH XCEL UNIVERSAL TROCAR STABLILITY SLEEVES: Brand: ENDOPATH XCEL

## (undated) DEVICE — DRSNG WND GZ PAD BORDERED 4X8IN STRL

## (undated) DEVICE — FRCP BIOP COLD ENDOJAW ALLGTR W/NDL 2.8X2300MM BLU

## (undated) DEVICE — TOWEL,OR,DSP,ST,BLUE,STD,4/PK,20PK/CS: Brand: MEDLINE

## (undated) DEVICE — ENDOGATOR AUXILIARY WATER JET CONNECTOR: Brand: ENDOGATOR

## (undated) DEVICE — PATIENT RETURN ELECTRODE, SINGLE-USE, CONTACT QUALITY MONITORING, ADULT, WITH 9FT CORD, FOR PATIENTS WEIGING OVER 33LBS. (15KG): Brand: MEGADYNE

## (undated) DEVICE — RICH GENERAL LAPAROSCOPY: Brand: MEDLINE INDUSTRIES, INC.

## (undated) DEVICE — PTFE COATED BLADE 2.5': Brand: MEDLINE

## (undated) DEVICE — JELLY,LUBE,STERILE,FLIP TOP,TUBE,2-OZ: Brand: MEDLINE

## (undated) DEVICE — SUT PROLN 3/0 SH 48IN BLU 8534H

## (undated) DEVICE — KT ORCA VLV SXN AIR/H2O W/SEAL 1P/U STRL

## (undated) DEVICE — SUT PROLN 0/0 CTX 30IN 8454H

## (undated) DEVICE — HARMONIC ACE +7 LAPAROSCOPIC SHEARS ADVANCED HEMOSTASIS 5MM DIAMETER 36CM SHAFT LENGTH  FOR USE WITH GRAY HAND PIECE ONLY: Brand: HARMONIC ACE